# Patient Record
Sex: MALE | Race: WHITE | NOT HISPANIC OR LATINO
[De-identification: names, ages, dates, MRNs, and addresses within clinical notes are randomized per-mention and may not be internally consistent; named-entity substitution may affect disease eponyms.]

---

## 2019-06-19 ENCOUNTER — APPOINTMENT (OUTPATIENT)
Dept: ENDOCRINOLOGY | Facility: CLINIC | Age: 84
End: 2019-06-19
Payer: MEDICARE

## 2019-06-19 VITALS
OXYGEN SATURATION: 99 % | WEIGHT: 156 LBS | BODY MASS INDEX: 21.84 KG/M2 | DIASTOLIC BLOOD PRESSURE: 72 MMHG | HEIGHT: 71 IN | SYSTOLIC BLOOD PRESSURE: 130 MMHG | HEART RATE: 72 BPM

## 2019-06-19 DIAGNOSIS — Z78.9 OTHER SPECIFIED HEALTH STATUS: ICD-10-CM

## 2019-06-19 DIAGNOSIS — Z87.39 PERSONAL HISTORY OF OTHER DISEASES OF THE MUSCULOSKELETAL SYSTEM AND CONNECTIVE TISSUE: ICD-10-CM

## 2019-06-19 DIAGNOSIS — Z83.3 FAMILY HISTORY OF DIABETES MELLITUS: ICD-10-CM

## 2019-06-19 DIAGNOSIS — Z87.09 PERSONAL HISTORY OF OTHER DISEASES OF THE RESPIRATORY SYSTEM: ICD-10-CM

## 2019-06-19 DIAGNOSIS — Z60.2 PROBLEMS RELATED TO LIVING ALONE: ICD-10-CM

## 2019-06-19 DIAGNOSIS — K21.9 GASTRO-ESOPHAGEAL REFLUX DISEASE W/OUT ESOPHAGITIS: ICD-10-CM

## 2019-06-19 DIAGNOSIS — Z86.69 PERSONAL HISTORY OF OTHER DISEASES OF THE NERVOUS SYSTEM AND SENSE ORGANS: ICD-10-CM

## 2019-06-19 LAB — GLUCOSE BLDC GLUCOMTR-MCNC: 130

## 2019-06-19 PROCEDURE — 99204 OFFICE O/P NEW MOD 45 MIN: CPT | Mod: 25

## 2019-06-19 PROCEDURE — 82962 GLUCOSE BLOOD TEST: CPT

## 2019-06-19 RX ORDER — PANTOPRAZOLE 40 MG/1
40 TABLET, DELAYED RELEASE ORAL DAILY
Refills: 0 | Status: ACTIVE | COMMUNITY

## 2019-06-19 RX ORDER — GLIMEPIRIDE 4 MG/1
4 TABLET ORAL DAILY
Refills: 0 | Status: DISCONTINUED | COMMUNITY
End: 2019-06-19

## 2019-06-19 RX ORDER — ASPIRIN 81 MG
81 TABLET, DELAYED RELEASE (ENTERIC COATED) ORAL DAILY
Refills: 0 | Status: ACTIVE | COMMUNITY

## 2019-06-19 SDOH — SOCIAL STABILITY - SOCIAL INSECURITY: PROBLEMS RELATED TO LIVING ALONE: Z60.2
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
## 2019-06-19 NOTE — ASSESSMENT
[FreeTextEntry1] : 86 year old male with longstanding type 2 DM with associated nephropathy, HTN and Hyperlipidemia.  His diabetes control is complicated by postprandial hyperglycemia.  He also has a right sided lower extremity ulcer.  \par \par 1. Type 2 DM-  Change glimepiride to glipziide for less risk of hypoglycemia ins setting of CKD and move dosing to before dinner.  Change Januvia dosing to AM.  Arrange for RD visit.  \par 2.  HTN- controlled on amlodipine and benazepril\par 3.  Hyperlipidemia-  continue atorvastatin\par 4.  Right LE ulcer- follow up with wound care.  Does not appear to be acutely infected at this time.

## 2019-06-19 NOTE — PHYSICAL EXAM
[No Proptosis] : no proptosis [Normal Sclera/Conjunctiva] : normal sclera/conjunctiva [No Acute Distress] : no acute distress [No Neck Mass] : no neck mass was observed [No LAD] : no lymphadenopathy [Thyroid Not Enlarged] : the thyroid was not enlarged [No Thyroid Nodules] : there were no palpable thyroid nodules [Normal Rate and Effort] : normal respiratory rhythm and effort [Clear to Auscultation] : lungs were clear to auscultation bilaterally [Normal S1, S2] : normal S1 and S2 [Normal Rate] : heart rate was normal  [Regular Rhythm] : with a regular rhythm [Murmurs] : no murmurs [Pedal Pulses Normal] : the pedal pulses are present [No Joint Swelling] : no joint swelling seen [No Clubbing, Cyanosis] : no clubbing  or cyanosis of the fingernails [Foot Ulcers] : foot ulcers [Acanthosis Nigricans] : no acanthosis nigricans [Normal Sensation on Monofilament Testing] : normal sensation on monofilament testing of lower extremities [No Tremors] : no tremors [Normal Insight/Judgement] : insight and judgment were intact [Normal Affect] : the affect was normal [Normal Mood] : the mood was normal [de-identified] : elderly male [de-identified] : trace edema b/l [de-identified] : the is a 2 cm ulcer on the medial surface of right ankle, seeing wound care

## 2019-06-19 NOTE — REVIEW OF SYSTEMS
[Recent Weight Loss (___ Lbs)] : recent [unfilled] ~Ulb weight loss [Shortness Of Breath] : shortness of breath [Gas/Bloating] : gas/bloating [Ulcer] : ulcer [Polyuria] : no polyuria [Chest Pain] : no chest pain [Blurry Vision] : no blurred vision [Muscle Cramps] : no muscle cramps [Pain/Numbness of Digits] : no pain/numbness of digits [Polydipsia] : no polydipsia [Insomnia] : no insomnia

## 2019-07-05 ENCOUNTER — APPOINTMENT (OUTPATIENT)
Dept: ENDOCRINOLOGY | Facility: CLINIC | Age: 84
End: 2019-07-05

## 2019-08-29 ENCOUNTER — APPOINTMENT (OUTPATIENT)
Dept: ENDOCRINOLOGY | Facility: CLINIC | Age: 84
End: 2019-08-29

## 2019-11-05 ENCOUNTER — APPOINTMENT (OUTPATIENT)
Dept: ENDOCRINOLOGY | Facility: CLINIC | Age: 84
End: 2019-11-05
Payer: MEDICARE

## 2019-11-05 VITALS
HEART RATE: 50 BPM | SYSTOLIC BLOOD PRESSURE: 128 MMHG | DIASTOLIC BLOOD PRESSURE: 80 MMHG | WEIGHT: 160 LBS | BODY MASS INDEX: 22.4 KG/M2 | HEIGHT: 71 IN | OXYGEN SATURATION: 95 %

## 2019-11-05 DIAGNOSIS — E56.9 VITAMIN DEFICIENCY, UNSPECIFIED: ICD-10-CM

## 2019-11-05 DIAGNOSIS — I10 ESSENTIAL (PRIMARY) HYPERTENSION: ICD-10-CM

## 2019-11-05 LAB — GLUCOSE BLDC GLUCOMTR-MCNC: 235

## 2019-11-05 PROCEDURE — 99214 OFFICE O/P EST MOD 30 MIN: CPT | Mod: 25

## 2019-11-05 PROCEDURE — 97803 MED NUTRITION INDIV SUBSEQ: CPT

## 2019-11-05 PROCEDURE — 82962 GLUCOSE BLOOD TEST: CPT

## 2019-11-05 RX ORDER — AMLODIPINE BESYLATE 2.5 MG/1
2.5 TABLET ORAL DAILY
Refills: 0 | Status: DISCONTINUED | COMMUNITY
End: 2019-11-05

## 2019-11-05 NOTE — PHYSICAL EXAM
[No Acute Distress] : no acute distress [Normal Sclera/Conjunctiva] : normal sclera/conjunctiva [No Proptosis] : no proptosis [No Neck Mass] : no neck mass was observed [No LAD] : no lymphadenopathy [Thyroid Not Enlarged] : the thyroid was not enlarged [No Thyroid Nodules] : there were no palpable thyroid nodules [Normal Rate and Effort] : normal respiratory rhythm and effort [Clear to Auscultation] : lungs were clear to auscultation bilaterally [Normal Rate] : heart rate was normal  [Normal S1, S2] : normal S1 and S2 [Regular Rhythm] : with a regular rhythm [No Joint Swelling] : no joint swelling seen [No Clubbing, Cyanosis] : no clubbing  or cyanosis of the fingernails [Normal Insight/Judgement] : insight and judgment were intact [Normal Affect] : the affect was normal [Normal Mood] : the mood was normal [Acanthosis Nigricans] : no acanthosis nigricans [de-identified] : elderly male [de-identified] : 2/6 systolic murmur,  occasional irregular beat.

## 2019-11-05 NOTE — REVIEW OF SYSTEMS
[Fatigue] : fatigue [Shortness Of Breath] : shortness of breath [Chest Pain] : no chest pain [Nausea] : no nausea [Pain/Numbness of Digits] : no pain/numbness of digits
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
## 2019-11-05 NOTE — ASSESSMENT
[FreeTextEntry1] : 86 year old male with longstanding type 2 DM with associated nephropathy, HTN and Hyperlipidemia.  His glycemic control has worsened.  \par \par 1. Type 2 DM-    Increase Glipizide to 10 mg BID.  Check A1c now\par 2.  HTN- controlled on Benazepril\par 3.  Hyperlipidemia-  continue atorvastatin\par  4.  Elevated TSH-  TSH of 5, however this was done in setting of acute infection and may represent sick euthyroid syndrome.  Repeat labs now.

## 2019-12-02 ENCOUNTER — MEDICATION RENEWAL (OUTPATIENT)
Age: 84
End: 2019-12-02

## 2019-12-13 ENCOUNTER — RX RENEWAL (OUTPATIENT)
Age: 84
End: 2019-12-13

## 2020-01-02 ENCOUNTER — RESULT REVIEW (OUTPATIENT)
Age: 85
End: 2020-01-02

## 2020-01-08 ENCOUNTER — RESULT REVIEW (OUTPATIENT)
Age: 85
End: 2020-01-08

## 2020-01-15 ENCOUNTER — EMERGENCY (EMERGENCY)
Facility: HOSPITAL | Age: 85
LOS: 1 days | Discharge: HOME | End: 2020-01-15
Attending: EMERGENCY MEDICINE
Payer: MEDICARE

## 2020-01-15 VITALS — DIASTOLIC BLOOD PRESSURE: 67 MMHG | HEART RATE: 84 BPM | SYSTOLIC BLOOD PRESSURE: 120 MMHG | TEMPERATURE: 98 F

## 2020-01-15 VITALS
HEART RATE: 75 BPM | TEMPERATURE: 99 F | HEIGHT: 66 IN | RESPIRATION RATE: 16 BRPM | WEIGHT: 139.99 LBS | SYSTOLIC BLOOD PRESSURE: 127 MMHG | OXYGEN SATURATION: 98 % | DIASTOLIC BLOOD PRESSURE: 71 MMHG

## 2020-01-15 LAB
ALBUMIN SERPL ELPH-MCNC: 4.1 G/DL — SIGNIFICANT CHANGE UP (ref 3.3–5.2)
ALP SERPL-CCNC: 109 U/L — SIGNIFICANT CHANGE UP (ref 40–120)
ALT FLD-CCNC: 11 U/L — SIGNIFICANT CHANGE UP
ANION GAP SERPL CALC-SCNC: 15 MMOL/L — SIGNIFICANT CHANGE UP (ref 5–17)
APTT BLD: 28.7 SEC — SIGNIFICANT CHANGE UP (ref 27.5–36.3)
AST SERPL-CCNC: 16 U/L — SIGNIFICANT CHANGE UP
BILIRUB SERPL-MCNC: 0.4 MG/DL — SIGNIFICANT CHANGE UP (ref 0.4–2)
BUN SERPL-MCNC: 53 MG/DL — HIGH (ref 8–20)
CALCIUM SERPL-MCNC: 9.3 MG/DL — SIGNIFICANT CHANGE UP (ref 8.6–10.2)
CHLORIDE SERPL-SCNC: 98 MMOL/L — SIGNIFICANT CHANGE UP (ref 98–107)
CO2 SERPL-SCNC: 26 MMOL/L — SIGNIFICANT CHANGE UP (ref 22–29)
CREAT SERPL-MCNC: 1.89 MG/DL — HIGH (ref 0.5–1.3)
GLUCOSE SERPL-MCNC: 172 MG/DL — HIGH (ref 70–115)
HCT VFR BLD CALC: 40.9 % — SIGNIFICANT CHANGE UP (ref 39–50)
HGB BLD-MCNC: 11.8 G/DL — LOW (ref 13–17)
INR BLD: 1.07 RATIO — SIGNIFICANT CHANGE UP (ref 0.88–1.16)
MAGNESIUM SERPL-MCNC: 1.7 MG/DL — SIGNIFICANT CHANGE UP (ref 1.6–2.6)
MCHC RBC-ENTMCNC: 23.9 PG — LOW (ref 27–34)
MCHC RBC-ENTMCNC: 28.9 GM/DL — LOW (ref 32–36)
MCV RBC AUTO: 83 FL — SIGNIFICANT CHANGE UP (ref 80–100)
NT-PROBNP SERPL-SCNC: 5659 PG/ML — HIGH (ref 0–300)
PLATELET # BLD AUTO: 287 K/UL — SIGNIFICANT CHANGE UP (ref 150–400)
POTASSIUM SERPL-MCNC: 4.3 MMOL/L — SIGNIFICANT CHANGE UP (ref 3.5–5.3)
POTASSIUM SERPL-SCNC: 4.3 MMOL/L — SIGNIFICANT CHANGE UP (ref 3.5–5.3)
PROT SERPL-MCNC: 7.3 G/DL — SIGNIFICANT CHANGE UP (ref 6.6–8.7)
PROTHROM AB SERPL-ACNC: 12.3 SEC — SIGNIFICANT CHANGE UP (ref 10–12.9)
RBC # BLD: 4.93 M/UL — SIGNIFICANT CHANGE UP (ref 4.2–5.8)
RBC # FLD: 15.8 % — HIGH (ref 10.3–14.5)
SODIUM SERPL-SCNC: 139 MMOL/L — SIGNIFICANT CHANGE UP (ref 135–145)
TROPONIN T SERPL-MCNC: 0.04 NG/ML — SIGNIFICANT CHANGE UP (ref 0–0.06)
WBC # BLD: 11.32 K/UL — HIGH (ref 3.8–10.5)
WBC # FLD AUTO: 11.32 K/UL — HIGH (ref 3.8–10.5)

## 2020-01-15 PROCEDURE — 85610 PROTHROMBIN TIME: CPT

## 2020-01-15 PROCEDURE — 99284 EMERGENCY DEPT VISIT MOD MDM: CPT | Mod: 25

## 2020-01-15 PROCEDURE — 83880 ASSAY OF NATRIURETIC PEPTIDE: CPT

## 2020-01-15 PROCEDURE — 96360 HYDRATION IV INFUSION INIT: CPT

## 2020-01-15 PROCEDURE — 85027 COMPLETE CBC AUTOMATED: CPT

## 2020-01-15 PROCEDURE — 71045 X-RAY EXAM CHEST 1 VIEW: CPT

## 2020-01-15 PROCEDURE — 83735 ASSAY OF MAGNESIUM: CPT

## 2020-01-15 PROCEDURE — 93005 ELECTROCARDIOGRAM TRACING: CPT

## 2020-01-15 PROCEDURE — 93010 ELECTROCARDIOGRAM REPORT: CPT

## 2020-01-15 PROCEDURE — 36415 COLL VENOUS BLD VENIPUNCTURE: CPT

## 2020-01-15 PROCEDURE — 99285 EMERGENCY DEPT VISIT HI MDM: CPT

## 2020-01-15 PROCEDURE — 85730 THROMBOPLASTIN TIME PARTIAL: CPT

## 2020-01-15 PROCEDURE — 71045 X-RAY EXAM CHEST 1 VIEW: CPT | Mod: 26

## 2020-01-15 PROCEDURE — 84484 ASSAY OF TROPONIN QUANT: CPT

## 2020-01-15 PROCEDURE — 80053 COMPREHEN METABOLIC PANEL: CPT

## 2020-01-15 RX ORDER — SODIUM CHLORIDE 9 MG/ML
500 INJECTION INTRAMUSCULAR; INTRAVENOUS; SUBCUTANEOUS ONCE
Refills: 0 | Status: COMPLETED | OUTPATIENT
Start: 2020-01-15 | End: 2020-01-15

## 2020-01-15 RX ADMIN — SODIUM CHLORIDE 500 MILLILITER(S): 9 INJECTION INTRAMUSCULAR; INTRAVENOUS; SUBCUTANEOUS at 15:27

## 2020-01-15 RX ADMIN — SODIUM CHLORIDE 500 MILLILITER(S): 9 INJECTION INTRAMUSCULAR; INTRAVENOUS; SUBCUTANEOUS at 16:27

## 2020-01-15 NOTE — ED PROVIDER NOTE - PATIENT PORTAL LINK FT
You can access the FollowMyHealth Patient Portal offered by [facility] by registering at the following website: [email or web address]. By joining [employer]’s FollowMyHealth portal, you will also be able to view your health information using other applications (apps) compatible with our system.

## 2020-01-15 NOTE — ED ADULT TRIAGE NOTE - CHIEF COMPLAINT QUOTE
patient c/o feeling dizzy, home aide, took vitals and noticed to have abnormal vital signs, patient recently started taking metoprolol. EMS reported to have bradycardia, and was brought to Critical Care, MD Pavon called to evaluate.

## 2020-01-15 NOTE — ED PROVIDER NOTE - PHYSICAL EXAMINATION
Gen: No acute distress, non toxic  HEENT: Mucous membranes moist, pink conjunctivae, EOMI  CV: irregular  nl s1/s2.  Resp: CTAB, normal rate and effort  GI: Abdomen soft, NT, ND. No rebound, no guarding  : No CVAT  Neuro: A&O x 3, moving all 4 extremities  MSK: No spine or joint tenderness to palpation  Skin: No rashes. intact and perfused.

## 2020-01-15 NOTE — ED ADULT NURSE NOTE - ED CARDIAC RHYTHM
bigeminy
Rajesh Rosas), Obstetrics and Gynecology  [address] [facility]  [address] [place] [address]  Phone: [phone]  Fax: [phone]

## 2020-01-15 NOTE — ED ADULT NURSE NOTE - CHPI ED NUR SYMPTOMS NEG
no weakness/no nausea/no chills/no decreased eating/drinking/no fever/no pain/no tingling/no vomiting

## 2020-01-15 NOTE — ED ADULT NURSE NOTE - OBJECTIVE STATEMENT
pt reports home health aid called hospital because he was hypotensive and bradycardiac. pt arrives normotensive with hr in 70's. a and o x3. breathing even and unlabored. bigeminy on nsr. pt reports minor dizziness, notes he started on Metroprolol recently. pt only other complaints is lower back pain which he reports is chronic. sitting calm in bed. will continue to monitor.

## 2020-01-15 NOTE — ED PROVIDER NOTE - CLINICAL SUMMARY MEDICAL DECISION MAKING FREE TEXT BOX
pt with dizziness after starting entresto/metoprolol yesterday, no symptoms today, bp was ?in 90's per ems with bigger cuff, used appropriate cuff here and bp has been ~120/70 entire time here. frequent pvc, pt and family states that's the norm for him. sinus rhythm on ekg and monitor with clear p waves. pt with prior testing showing effusion, xr here with effusion, satting 100% on room air and no change. left a message for pt pcp, no call back but family set up pcp appointment again for tmrw at 330, would like pt not to stay. pt without complaints and has very good follow up, labs grossly wnl, with known effusion and vs all wnl. given results, strict return precautions if anything changing. hayes for d/c. will hold entresto until sees pcp tmrw. to see cards within week.

## 2020-01-15 NOTE — ED PROVIDER NOTE - OBJECTIVE STATEMENT
85 y/o male hx chf, htn, frequent pvc dm recent 9 day stay at [place] with drainage of left effusion, started on multiple metoprolol and entresto yesterday by pcp ( NP Katelyn) sent in by ambulance after visiting OT checked vitals and felt pt bp was low and pulse was low. Pt states felt dizzy yesterday right after starting the entresto. Has been feeling fine today, no complaints, no cp, sob, dizziness, cough, f/c. Pt's kids bedside stating usual state of health since yesterday other than feeling dizzy after meds that resolved. Has multiple f/u this week including pulm, gi, cardio.    ROS: No fever/chills. No eye pain/changes in vision, No ear pain/sore throat/dysphagia, No chest pain/palpitations. No SOB/cough/. No abdominal pain, N/V/D, no black/bloody bm. No dysuria/frequency/discharge, No headache. No Dizziness.    No rashes or breaks in skin. No numbness/tingling/weakness.

## 2020-01-15 NOTE — ED ADULT NURSE NOTE - NSIMPLEMENTINTERV_GEN_ALL_ED
Implemented All Fall Risk Interventions:  [place] to call system. Call bell, personal items and telephone within reach. Instruct patient to call for assistance. Room bathroom lighting operational. Non-slip footwear when patient is off stretcher. Physically safe environment: no spills, clutter or unnecessary equipment. Stretcher in lowest position, wheels locked, appropriate side rails in place. Provide visual cue, wrist band, yellow gown, etc. Monitor gait and stability. Monitor for mental status changes and reorient to person, place, and time. Review medications for side effects contributing to fall risk. Reinforce activity limits and safety measures with patient and family.

## 2020-03-05 ENCOUNTER — RX RENEWAL (OUTPATIENT)
Age: 85
End: 2020-03-05

## 2020-03-05 RX ORDER — ATORVASTATIN CALCIUM 10 MG/1
10 TABLET, FILM COATED ORAL
Qty: 90 | Refills: 1 | Status: ACTIVE | COMMUNITY
Start: 2020-03-05 | End: 1900-01-01

## 2020-03-12 ENCOUNTER — RX RENEWAL (OUTPATIENT)
Age: 85
End: 2020-03-12

## 2020-03-12 RX ORDER — GLIPIZIDE 10 MG/1
10 TABLET ORAL TWICE DAILY
Qty: 180 | Refills: 1 | Status: ACTIVE | COMMUNITY
Start: 2019-06-19 | End: 1900-01-01

## 2020-03-31 ENCOUNTER — APPOINTMENT (OUTPATIENT)
Dept: ENDOCRINOLOGY | Facility: CLINIC | Age: 85
End: 2020-03-31
Payer: MEDICARE

## 2020-03-31 DIAGNOSIS — E11.29 TYPE 2 DIABETES MELLITUS WITH OTHER DIABETIC KIDNEY COMPLICATION: ICD-10-CM

## 2020-03-31 DIAGNOSIS — E78.5 HYPERLIPIDEMIA, UNSPECIFIED: ICD-10-CM

## 2020-03-31 DIAGNOSIS — E03.9 HYPOTHYROIDISM, UNSPECIFIED: ICD-10-CM

## 2020-03-31 PROCEDURE — G2012 BRIEF CHECK IN BY MD/QHP: CPT

## 2020-03-31 PROCEDURE — 98966 PH1 ASSMT&MGMT NQHP 5-10: CPT | Mod: CR

## 2020-03-31 RX ORDER — BENAZEPRIL HYDROCHLORIDE 5 MG/1
5 TABLET ORAL DAILY
Refills: 0 | Status: DISCONTINUED | COMMUNITY
End: 2020-03-31

## 2020-04-21 PROBLEM — E11.29 TYPE 2 DIABETES MELLITUS WITH OTHER DIABETIC KIDNEY COMPLICATION, WITHOUT LONG-TERM CURRENT USE OF INSULIN: Status: ACTIVE | Noted: 2019-06-19

## 2020-05-13 ENCOUNTER — RX RENEWAL (OUTPATIENT)
Age: 85
End: 2020-05-13

## 2020-05-13 RX ORDER — SITAGLIPTIN 50 MG/1
50 TABLET, FILM COATED ORAL DAILY
Qty: 90 | Refills: 1 | Status: ACTIVE | COMMUNITY
Start: 1900-01-01 | End: 1900-01-01

## 2020-05-20 DIAGNOSIS — Z00.00 ENCOUNTER FOR GENERAL ADULT MEDICAL EXAMINATION W/OUT ABNORMAL FINDINGS: ICD-10-CM

## 2020-07-09 ENCOUNTER — APPOINTMENT (OUTPATIENT)
Dept: ENDOCRINOLOGY | Facility: CLINIC | Age: 85
End: 2020-07-09

## 2020-08-03 ENCOUNTER — INPATIENT (INPATIENT)
Facility: HOSPITAL | Age: 85
LOS: 8 days | Discharge: HOME | DRG: 981 | End: 2020-08-12
Attending: HOSPITALIST | Admitting: INTERNAL MEDICINE
Payer: MEDICARE

## 2020-08-03 VITALS
HEIGHT: 71 IN | WEIGHT: 130.07 LBS | DIASTOLIC BLOOD PRESSURE: 71 MMHG | TEMPERATURE: 98 F | SYSTOLIC BLOOD PRESSURE: 111 MMHG | OXYGEN SATURATION: 99 % | HEART RATE: 90 BPM | RESPIRATION RATE: 18 BRPM

## 2020-08-03 DIAGNOSIS — Z98.890 OTHER SPECIFIED POSTPROCEDURAL STATES: Chronic | ICD-10-CM

## 2020-08-03 LAB
ALBUMIN SERPL ELPH-MCNC: 3.6 G/DL — SIGNIFICANT CHANGE UP (ref 3.3–5.2)
ALP SERPL-CCNC: 120 U/L — SIGNIFICANT CHANGE UP (ref 40–120)
ALT FLD-CCNC: 11 U/L — SIGNIFICANT CHANGE UP
ANION GAP SERPL CALC-SCNC: 12 MMOL/L — SIGNIFICANT CHANGE UP (ref 5–17)
ANISOCYTOSIS BLD QL: SIGNIFICANT CHANGE UP
APTT BLD: 29.9 SEC — SIGNIFICANT CHANGE UP (ref 27.5–35.5)
AST SERPL-CCNC: 18 U/L — SIGNIFICANT CHANGE UP
BASOPHILS # BLD AUTO: 0 K/UL — SIGNIFICANT CHANGE UP (ref 0–0.2)
BASOPHILS NFR BLD AUTO: 0 % — SIGNIFICANT CHANGE UP (ref 0–2)
BILIRUB SERPL-MCNC: 0.5 MG/DL — SIGNIFICANT CHANGE UP (ref 0.4–2)
BUN SERPL-MCNC: 44 MG/DL — HIGH (ref 8–20)
CALCIUM SERPL-MCNC: 8.6 MG/DL — SIGNIFICANT CHANGE UP (ref 8.6–10.2)
CHLORIDE SERPL-SCNC: 101 MMOL/L — SIGNIFICANT CHANGE UP (ref 98–107)
CO2 SERPL-SCNC: 27 MMOL/L — SIGNIFICANT CHANGE UP (ref 22–29)
CREAT SERPL-MCNC: 1.45 MG/DL — HIGH (ref 0.5–1.3)
DACRYOCYTES BLD QL SMEAR: SLIGHT — SIGNIFICANT CHANGE UP
ELLIPTOCYTES BLD QL SMEAR: SLIGHT — SIGNIFICANT CHANGE UP
EOSINOPHIL # BLD AUTO: 0.06 K/UL — SIGNIFICANT CHANGE UP (ref 0–0.5)
EOSINOPHIL NFR BLD AUTO: 0.9 % — SIGNIFICANT CHANGE UP (ref 0–6)
GIANT PLATELETS BLD QL SMEAR: PRESENT — SIGNIFICANT CHANGE UP
GLUCOSE SERPL-MCNC: 124 MG/DL — HIGH (ref 70–99)
HCT VFR BLD CALC: 34 % — LOW (ref 39–50)
HGB BLD-MCNC: 9.2 G/DL — LOW (ref 13–17)
HYPOCHROMIA BLD QL: SLIGHT — SIGNIFICANT CHANGE UP
INR BLD: 1.18 RATIO — HIGH (ref 0.88–1.16)
LYMPHOCYTES # BLD AUTO: 0.97 K/UL — LOW (ref 1–3.3)
LYMPHOCYTES # BLD AUTO: 14.3 % — SIGNIFICANT CHANGE UP (ref 13–44)
MACROCYTES BLD QL: SLIGHT — SIGNIFICANT CHANGE UP
MAGNESIUM SERPL-MCNC: 1.9 MG/DL — SIGNIFICANT CHANGE UP (ref 1.6–2.6)
MANUAL SMEAR VERIFICATION: SIGNIFICANT CHANGE UP
MCHC RBC-ENTMCNC: 23.5 PG — LOW (ref 27–34)
MCHC RBC-ENTMCNC: 27.1 GM/DL — LOW (ref 32–36)
MCV RBC AUTO: 86.7 FL — SIGNIFICANT CHANGE UP (ref 80–100)
MICROCYTES BLD QL: SIGNIFICANT CHANGE UP
MONOCYTES # BLD AUTO: 0.48 K/UL — SIGNIFICANT CHANGE UP (ref 0–0.9)
MONOCYTES NFR BLD AUTO: 7.1 % — SIGNIFICANT CHANGE UP (ref 2–14)
NEUTROPHILS # BLD AUTO: 5.25 K/UL — SIGNIFICANT CHANGE UP (ref 1.8–7.4)
NEUTROPHILS NFR BLD AUTO: 77.7 % — HIGH (ref 43–77)
NT-PROBNP SERPL-SCNC: HIGH PG/ML (ref 0–300)
OVALOCYTES BLD QL SMEAR: SLIGHT — SIGNIFICANT CHANGE UP
PLAT MORPH BLD: NORMAL — SIGNIFICANT CHANGE UP
PLATELET # BLD AUTO: 205 K/UL — SIGNIFICANT CHANGE UP (ref 150–400)
POIKILOCYTOSIS BLD QL AUTO: SLIGHT — SIGNIFICANT CHANGE UP
POLYCHROMASIA BLD QL SMEAR: SLIGHT — SIGNIFICANT CHANGE UP
POTASSIUM SERPL-MCNC: 4.4 MMOL/L — SIGNIFICANT CHANGE UP (ref 3.5–5.3)
POTASSIUM SERPL-SCNC: 4.4 MMOL/L — SIGNIFICANT CHANGE UP (ref 3.5–5.3)
PROT SERPL-MCNC: 6.8 G/DL — SIGNIFICANT CHANGE UP (ref 6.6–8.7)
PROTHROM AB SERPL-ACNC: 13.6 SEC — SIGNIFICANT CHANGE UP (ref 10.6–13.6)
RBC # BLD: 3.92 M/UL — LOW (ref 4.2–5.8)
RBC # FLD: 23.2 % — HIGH (ref 10.3–14.5)
RBC BLD AUTO: ABNORMAL
SODIUM SERPL-SCNC: 140 MMOL/L — SIGNIFICANT CHANGE UP (ref 135–145)
TROPONIN T SERPL-MCNC: 0.04 NG/ML — SIGNIFICANT CHANGE UP (ref 0–0.06)
WBC # BLD: 6.76 K/UL — SIGNIFICANT CHANGE UP (ref 3.8–10.5)
WBC # FLD AUTO: 6.76 K/UL — SIGNIFICANT CHANGE UP (ref 3.8–10.5)

## 2020-08-03 PROCEDURE — 99218: CPT | Mod: CS

## 2020-08-03 PROCEDURE — 71045 X-RAY EXAM CHEST 1 VIEW: CPT | Mod: 26,CS

## 2020-08-03 PROCEDURE — 93971 EXTREMITY STUDY: CPT | Mod: 26,LT

## 2020-08-03 PROCEDURE — 93010 ELECTROCARDIOGRAM REPORT: CPT

## 2020-08-03 RX ORDER — ASPIRIN/CALCIUM CARB/MAGNESIUM 324 MG
325 TABLET ORAL ONCE
Refills: 0 | Status: COMPLETED | OUTPATIENT
Start: 2020-08-03 | End: 2020-08-03

## 2020-08-03 RX ORDER — FUROSEMIDE 40 MG
20 TABLET ORAL ONCE
Refills: 0 | Status: COMPLETED | OUTPATIENT
Start: 2020-08-03 | End: 2020-08-03

## 2020-08-03 NOTE — ED ADULT NURSE NOTE - OBJECTIVE STATEMENT
Late Entry: received pt from previous nurse alert and awake in bed  breathing with ease on ra, in no acute distress.

## 2020-08-03 NOTE — ED CDU PROVIDER INITIAL DAY NOTE - NS ED ROS FT
Constitutional: no fever, sweats, and no chills.  Eyes: no pain, no redness, and no visual changes.  ENMT: no ear pain and no hearing problems, no nasal congestion/drainage, no dysphagia, and no throat pain, no neck pain, no stiffness  CV: no chest pain, no edema.  Resp: no cough, +dyspnea  GI: no abdominal pain, no bloating, no constipation, no diarrhea, no nausea and no vomiting.  : no dysuria, no hematuria  MSK: no msk pain, no weakness  Skin: +ulcer L heel, no rashes.  Neuro: no LOC, no headache, no sensory deficits, and no weakness.

## 2020-08-03 NOTE — ED PROVIDER NOTE - PMH
CHF (congestive heart failure)    DM (diabetes mellitus)    HLD (hyperlipidemia)    HTN (hypertension)    PVCs (premature ventricular contractions)

## 2020-08-03 NOTE — ED ADULT TRIAGE NOTE - CHIEF COMPLAINT QUOTE
Shortness of breath x months, worsening today.  Pt denies chest pain or shortness of breath at this time.  Respirations unlabored, speaking in full sentences.  Denies home o2 use.

## 2020-08-03 NOTE — ED PROVIDER NOTE - ATTENDING CONTRIBUTION TO CARE
KARLA  86yoM; with pmh signif for CHF, HTN, Frequent PVCs, left pleural effusion (s/p drainage at [place] in [place]); now p/w sob.  denies chest pain, but c/o discomfort in epigastrium, radiating to chest associated with nausea and diaphoresis. denies cough. denies f/c/s. denies sick contacts. denies headache.  PMH:  CHF, HTN  EXAM:  General:     NAD, well-nourished, well-appearing  Head:     NC/AT, EOMI, oral mucosa moist  Neck:     trachea midline  Lungs:    trace bibasilar crackles  CVS:     S1S2, RRR, no m/g/r  Abd:     +BS, s/nt/nd, no organomegaly  Ext:    2+ radial and pedal pulses, no c/c/e  Neuro: AAOx3, no sensory/motor deficits  A/P:  86yoM with hx of CHF complicated by pleural effusion; now p/w sob  -labs, iv, xray, cardiac monitor, pulse ox

## 2020-08-03 NOTE — ED CDU PROVIDER INITIAL DAY NOTE - ATTENDING CONTRIBUTION TO CARE
86yo M w/ PMHx CHF, HTN, HLD, DM, frequent PVCs (followed by Dr. Reyes, [place] cardiology) presenting with chronic intermittent SOB. The SOB started months ago, lasts seconds, accompanied by 4-5/10 epigastric pressure, most recent episode this AM, since resolved. Pt reports a visiting nurse comes to his home every 1-2 days for wound care of a L heel ulcer, and today thought his lungs sounded weaker. +MEJÍA.  General:     NAD, well-nourished, well-appearing  Head:     NC/AT, EOMI, oral mucosa moist  Neck:     trachea midline  Lungs:   trace bibasilar crackles  CVS:     S1S2, RRR, no m/g/r  Abd:     +BS, s/nt/nd, no organomegaly  Ext:    2+ radial and pedal pulses, no c/c/e  Neuro: AAOx3, no sensory/motor deficits  A/P:  87yoM; with pmh signif for CHF, HTN, HLD, DM; now p/w sob  -serial enzymes  -cardiac monitor  -echo  -[facility] cardiology consult

## 2020-08-03 NOTE — ED PROVIDER NOTE - PHYSICAL EXAMINATION
General: well appearing, NAD  Head:  NC, AT  Eyes: EOMI, PERRLA, no scleral icterus  Ears: no erythema/drainage  Nose: midline, no bleeding/drainage  Throat: MMM  Cardiac: RRR, no m/r/g, no lower extremity edema  Respiratory: CTABL, no wheezes/rales/rhonchi, equal chest wall expansions  Abdomen: soft, ND, NT, no rebound tenderness, no guarding, nonperitonitic  MSK/Vascular: full ROM, distal pulses intact, soft compartments, warm extremities  Skin: ulcer L heel, bandaged on arrival; scarring on R ankle from previous ulcer, now healed  Neuro: AAOx3, strength 5/5, cranial nerves 2-12 grossly intact  Psych: calm, cooperative, normal affect

## 2020-08-03 NOTE — ED CDU PROVIDER INITIAL DAY NOTE - OBJECTIVE STATEMENT
88yo M w/ PMHx CHF, HTN, HLD, DM, frequent PVCs (followed by Dr. Reyes, [place] cardiology) presenting with chronic intermittent SOB. The SOB started months ago, lasts seconds, accompanied by 4-5/10 epigastric pressure, most recent episode this AM, since resolved. Pt reports a visiting nurse comes to his home every 1-2 days for wound care of a L heel ulcer, and today thought his lungs sounded weaker. +MEJÍA. Denies fever, HA, vision changes, dizziness, CP, cough, abd pain, n/v, LE edema. Denies sick contacts. Never smoker. Last stress test about 2y ago. Mobile with a walker, lives alone but has 3 daughters who visit regularly.

## 2020-08-03 NOTE — ED PROVIDER NOTE - CLINICAL SUMMARY MEDICAL DECISION MAKING FREE TEXT BOX
patient with sob, worsening cxr with pulmonary edema, vitals stable, will diurese, place in obs for serial enzymes, echo, and cardiology consult

## 2020-08-03 NOTE — ED ADULT NURSE NOTE - NSIMPLEMENTINTERV_GEN_ALL_ED
Implemented All Fall with Harm Risk Interventions:  [place] to call system. Call bell, personal items and telephone within reach. Instruct patient to call for assistance. Room bathroom lighting operational. Non-slip footwear when patient is off stretcher. Physically safe environment: no spills, clutter or unnecessary equipment. Stretcher in lowest position, wheels locked, appropriate side rails in place. Provide visual cue, wrist band, yellow gown, etc. Monitor gait and stability. Monitor for mental status changes and reorient to person, place, and time. Review medications for side effects contributing to fall risk. Reinforce activity limits and safety measures with patient and family. Provide visual clues: red socks.

## 2020-08-03 NOTE — ED PROVIDER NOTE - OBJECTIVE STATEMENT
88yo M w/ PMHx CHF, HTN, HLD, DM, frequent PVCs (followed by Dr. Reyes, [place] cardiology) presenting with chronic intermittent SOB. The SOB started months ago, lasts seconds, accompanied by 4-5/10 epigastric pressure, most recent episode this AM, since resolved. Pt reports a visiting nurse comes to his home every 1-2 days for wound care of a L heel ulcer, and today thought his lungs sounded weaker. +MEJÍA. Denies fever, HA, vision changes, dizziness, CP, cough, abd pain, n/v, LE edema. Denies sick contacts. Never smoker. Last stress test about 2y ago. Mobile with a walker, lives alone but has 3 daughters who visit regularly.

## 2020-08-04 DIAGNOSIS — I50.43 ACUTE ON CHRONIC COMBINED SYSTOLIC (CONGESTIVE) AND DIASTOLIC (CONGESTIVE) HEART FAILURE: ICD-10-CM

## 2020-08-04 DIAGNOSIS — I50.9 HEART FAILURE, UNSPECIFIED: ICD-10-CM

## 2020-08-04 DIAGNOSIS — J90 PLEURAL EFFUSION, NOT ELSEWHERE CLASSIFIED: ICD-10-CM

## 2020-08-04 LAB
A1C WITH ESTIMATED AVERAGE GLUCOSE RESULT: 6.3 % — HIGH (ref 4–5.6)
ALBUMIN SERPL ELPH-MCNC: 3.4 G/DL — SIGNIFICANT CHANGE UP (ref 3.3–5.2)
ALP SERPL-CCNC: 107 U/L — SIGNIFICANT CHANGE UP (ref 40–120)
ALT FLD-CCNC: 9 U/L — SIGNIFICANT CHANGE UP
ANION GAP SERPL CALC-SCNC: 13 MMOL/L — SIGNIFICANT CHANGE UP (ref 5–17)
AST SERPL-CCNC: 16 U/L — SIGNIFICANT CHANGE UP
B PERT IGG+IGM PNL SER: CLEAR — SIGNIFICANT CHANGE UP
BILIRUB SERPL-MCNC: 0.3 MG/DL — LOW (ref 0.4–2)
BUN SERPL-MCNC: 41 MG/DL — HIGH (ref 8–20)
CALCIUM SERPL-MCNC: 7.9 MG/DL — LOW (ref 8.6–10.2)
CHLORIDE SERPL-SCNC: 104 MMOL/L — SIGNIFICANT CHANGE UP (ref 98–107)
CHOLEST SERPL-MCNC: 73 MG/DL — LOW (ref 110–199)
CK SERPL-CCNC: 21 U/L — LOW (ref 30–200)
CO2 SERPL-SCNC: 25 MMOL/L — SIGNIFICANT CHANGE UP (ref 22–29)
COLOR FLD: YELLOW
CREAT SERPL-MCNC: 1.35 MG/DL — HIGH (ref 0.5–1.3)
ESTIMATED AVERAGE GLUCOSE: 134 MG/DL — HIGH (ref 68–114)
FERRITIN SERPL-MCNC: 1159 NG/ML — HIGH (ref 30–400)
FLUID INTAKE SUBSTANCE CLASS: SIGNIFICANT CHANGE UP
FLUID SEGMENTED GRANULOCYTES: 1 % — SIGNIFICANT CHANGE UP
GLUCOSE BLDC GLUCOMTR-MCNC: 110 MG/DL — HIGH (ref 70–99)
GLUCOSE BLDC GLUCOMTR-MCNC: 125 MG/DL — HIGH (ref 70–99)
GLUCOSE BLDC GLUCOMTR-MCNC: 168 MG/DL — HIGH (ref 70–99)
GLUCOSE BLDC GLUCOMTR-MCNC: 179 MG/DL — HIGH (ref 70–99)
GLUCOSE BLDC GLUCOMTR-MCNC: 251 MG/DL — HIGH (ref 70–99)
GLUCOSE BLDC GLUCOMTR-MCNC: 77 MG/DL — SIGNIFICANT CHANGE UP (ref 70–99)
GLUCOSE SERPL-MCNC: 109 MG/DL — HIGH (ref 70–99)
GRAM STN FLD: SIGNIFICANT CHANGE UP
HCT VFR BLD CALC: 31.8 % — LOW (ref 39–50)
HDLC SERPL-MCNC: 25 MG/DL — LOW
HGB BLD-MCNC: 8.9 G/DL — LOW (ref 13–17)
IRON SATN MFR SERPL: 21 % — SIGNIFICANT CHANGE UP (ref 16–55)
IRON SATN MFR SERPL: 54 UG/DL — LOW (ref 59–158)
LIPID PNL WITH DIRECT LDL SERPL: 28 MG/DL — SIGNIFICANT CHANGE UP
LYMPHOCYTES # FLD: 55 % — SIGNIFICANT CHANGE UP
MAGNESIUM SERPL-MCNC: 1.8 MG/DL — SIGNIFICANT CHANGE UP (ref 1.6–2.6)
MCHC RBC-ENTMCNC: 23.9 PG — LOW (ref 27–34)
MCHC RBC-ENTMCNC: 28 GM/DL — LOW (ref 32–36)
MCV RBC AUTO: 85.5 FL — SIGNIFICANT CHANGE UP (ref 80–100)
MESOTHL CELL # FLD: 1 % — SIGNIFICANT CHANGE UP
MONOS+MACROS # FLD: 43 % — SIGNIFICANT CHANGE UP
PH FLD: 8 — SIGNIFICANT CHANGE UP
PHOSPHATE SERPL-MCNC: 1.5 MG/DL — LOW (ref 2.4–4.7)
PLATELET # BLD AUTO: 173 K/UL — SIGNIFICANT CHANGE UP (ref 150–400)
POTASSIUM SERPL-MCNC: 3.4 MMOL/L — LOW (ref 3.5–5.3)
POTASSIUM SERPL-SCNC: 3.4 MMOL/L — LOW (ref 3.5–5.3)
PROT SERPL-MCNC: 6 G/DL — LOW (ref 6.6–8.7)
RBC # BLD: 3.72 M/UL — LOW (ref 4.2–5.8)
RBC # FLD: 23.4 % — HIGH (ref 10.3–14.5)
RCV VOL RI: 140 /UL — HIGH (ref 0–0)
SARS-COV-2 RNA SPEC QL NAA+PROBE: SIGNIFICANT CHANGE UP
SARS-COV-2 RNA SPEC QL NAA+PROBE: SIGNIFICANT CHANGE UP
SODIUM SERPL-SCNC: 142 MMOL/L — SIGNIFICANT CHANGE UP (ref 135–145)
SPECIMEN SOURCE: SIGNIFICANT CHANGE UP
TIBC SERPL-MCNC: 252 UG/DL — SIGNIFICANT CHANGE UP (ref 220–430)
TOTAL CHOLESTEROL/HDL RATIO MEASUREMENT: 3 RATIO — LOW (ref 3.4–9.6)
TOTAL NUCLEATED CELL COUNT, BODY FLUID: 213 /UL — SIGNIFICANT CHANGE UP
TRANSFERRIN SERPL-MCNC: 176 MG/DL — LOW (ref 180–329)
TRIGL SERPL-MCNC: 101 MG/DL — SIGNIFICANT CHANGE UP (ref 10–200)
TROPONIN T SERPL-MCNC: 0.04 NG/ML — SIGNIFICANT CHANGE UP (ref 0–0.06)
TSH SERPL-MCNC: 6.57 UIU/ML — HIGH (ref 0.27–4.2)
TUBE TYPE: SIGNIFICANT CHANGE UP
WBC # BLD: 5.42 K/UL — SIGNIFICANT CHANGE UP (ref 3.8–10.5)
WBC # FLD AUTO: 5.42 K/UL — SIGNIFICANT CHANGE UP (ref 3.8–10.5)

## 2020-08-04 PROCEDURE — 99223 1ST HOSP IP/OBS HIGH 75: CPT

## 2020-08-04 PROCEDURE — 99217: CPT | Mod: CS

## 2020-08-04 PROCEDURE — 71045 X-RAY EXAM CHEST 1 VIEW: CPT | Mod: 26

## 2020-08-04 PROCEDURE — 71250 CT THORAX DX C-: CPT | Mod: 26

## 2020-08-04 PROCEDURE — 99223 1ST HOSP IP/OBS HIGH 75: CPT | Mod: AI

## 2020-08-04 PROCEDURE — 93010 ELECTROCARDIOGRAM REPORT: CPT

## 2020-08-04 PROCEDURE — 99232 SBSQ HOSP IP/OBS MODERATE 35: CPT

## 2020-08-04 RX ORDER — ATORVASTATIN CALCIUM 80 MG/1
10 TABLET, FILM COATED ORAL AT BEDTIME
Refills: 0 | Status: DISCONTINUED | OUTPATIENT
Start: 2020-08-04 | End: 2020-08-04

## 2020-08-04 RX ORDER — ATORVASTATIN CALCIUM 80 MG/1
40 TABLET, FILM COATED ORAL AT BEDTIME
Refills: 0 | Status: DISCONTINUED | OUTPATIENT
Start: 2020-08-04 | End: 2020-08-06

## 2020-08-04 RX ORDER — FUROSEMIDE 40 MG
20 TABLET ORAL
Refills: 0 | Status: DISCONTINUED | OUTPATIENT
Start: 2020-08-04 | End: 2020-08-06

## 2020-08-04 RX ORDER — DEXTROSE 50 % IN WATER 50 %
12.5 SYRINGE (ML) INTRAVENOUS ONCE
Refills: 0 | Status: DISCONTINUED | OUTPATIENT
Start: 2020-08-04 | End: 2020-08-05

## 2020-08-04 RX ORDER — GLUCAGON INJECTION, SOLUTION 0.5 MG/.1ML
1 INJECTION, SOLUTION SUBCUTANEOUS ONCE
Refills: 0 | Status: DISCONTINUED | OUTPATIENT
Start: 2020-08-04 | End: 2020-08-05

## 2020-08-04 RX ORDER — INSULIN LISPRO 100/ML
VIAL (ML) SUBCUTANEOUS
Refills: 0 | Status: DISCONTINUED | OUTPATIENT
Start: 2020-08-04 | End: 2020-08-05

## 2020-08-04 RX ORDER — LIDOCAINE HCL 20 MG/ML
10 VIAL (ML) INJECTION ONCE
Refills: 0 | Status: DISCONTINUED | OUTPATIENT
Start: 2020-08-04 | End: 2020-08-12

## 2020-08-04 RX ORDER — DEXTROSE 50 % IN WATER 50 %
25 SYRINGE (ML) INTRAVENOUS ONCE
Refills: 0 | Status: DISCONTINUED | OUTPATIENT
Start: 2020-08-04 | End: 2020-08-05

## 2020-08-04 RX ORDER — ASPIRIN/CALCIUM CARB/MAGNESIUM 324 MG
81 TABLET ORAL DAILY
Refills: 0 | Status: DISCONTINUED | OUTPATIENT
Start: 2020-08-04 | End: 2020-08-12

## 2020-08-04 RX ORDER — OXYCODONE AND ACETAMINOPHEN 5; 325 MG/1; MG/1
1 TABLET ORAL ONCE
Refills: 0 | Status: DISCONTINUED | OUTPATIENT
Start: 2020-08-04 | End: 2020-08-04

## 2020-08-04 RX ORDER — PANTOPRAZOLE SODIUM 20 MG/1
40 TABLET, DELAYED RELEASE ORAL
Refills: 0 | Status: DISCONTINUED | OUTPATIENT
Start: 2020-08-04 | End: 2020-08-12

## 2020-08-04 RX ORDER — DEXTROSE 50 % IN WATER 50 %
15 SYRINGE (ML) INTRAVENOUS ONCE
Refills: 0 | Status: DISCONTINUED | OUTPATIENT
Start: 2020-08-04 | End: 2020-08-05

## 2020-08-04 RX ORDER — SODIUM CHLORIDE 9 MG/ML
1000 INJECTION, SOLUTION INTRAVENOUS
Refills: 0 | Status: DISCONTINUED | OUTPATIENT
Start: 2020-08-04 | End: 2020-08-05

## 2020-08-04 RX ORDER — ALPRAZOLAM 0.25 MG
0.25 TABLET ORAL DAILY
Refills: 0 | Status: DISCONTINUED | OUTPATIENT
Start: 2020-08-04 | End: 2020-08-11

## 2020-08-04 RX ORDER — HEPARIN SODIUM 5000 [USP'U]/ML
5000 INJECTION INTRAVENOUS; SUBCUTANEOUS EVERY 8 HOURS
Refills: 0 | Status: DISCONTINUED | OUTPATIENT
Start: 2020-08-04 | End: 2020-08-06

## 2020-08-04 RX ADMIN — Medication 325 MILLIGRAM(S): at 00:49

## 2020-08-04 RX ADMIN — Medication 0.25 MILLIGRAM(S): at 13:40

## 2020-08-04 RX ADMIN — ATORVASTATIN CALCIUM 40 MILLIGRAM(S): 80 TABLET, FILM COATED ORAL at 21:27

## 2020-08-04 RX ADMIN — HEPARIN SODIUM 5000 UNIT(S): 5000 INJECTION INTRAVENOUS; SUBCUTANEOUS at 17:17

## 2020-08-04 RX ADMIN — Medication 20 MILLIGRAM(S): at 17:18

## 2020-08-04 RX ADMIN — Medication 20 MILLIGRAM(S): at 06:19

## 2020-08-04 RX ADMIN — OXYCODONE AND ACETAMINOPHEN 1 TABLET(S): 5; 325 TABLET ORAL at 21:27

## 2020-08-04 RX ADMIN — OXYCODONE AND ACETAMINOPHEN 1 TABLET(S): 5; 325 TABLET ORAL at 22:29

## 2020-08-04 RX ADMIN — Medication 81 MILLIGRAM(S): at 12:24

## 2020-08-04 RX ADMIN — Medication 2: at 06:20

## 2020-08-04 NOTE — ED ADULT NURSE REASSESSMENT NOTE - NS ED NURSE REASSESS COMMENT FT1
Dry scaling skin bilateral lower extremities. Scab over wounds are present on the ankles and feet bilaterally. Dry scaling skin bilateral lower extremities. Scabs present to ankles and feet bilaterally. Sacrum tender to touch, no redness present, skin dry, intact.

## 2020-08-04 NOTE — H&P ADULT - NSICDXPASTMEDICALHX_GEN_ALL_CORE_FT
PAST MEDICAL HISTORY:  CHF (congestive heart failure)     DM (diabetes mellitus)     HLD (hyperlipidemia)     HTN (hypertension)     PVCs (premature ventricular contractions)

## 2020-08-04 NOTE — CONSULT NOTE ADULT - PROBLEM SELECTOR RECOMMENDATION 2
For Cath tomorrow    To be admitted to medicine  Discussed with cardiology, hospitalist and Dr Leggett
(2) very limited

## 2020-08-04 NOTE — ED ADULT NURSE REASSESSMENT NOTE - NSIMPLEMENTINTERV_GEN_ALL_ED
Implemented All Fall with Harm Risk Interventions:  [place] to call system. Call bell, personal items and telephone within reach. Instruct patient to call for assistance. Room bathroom lighting operational. Non-slip footwear when patient is off stretcher. Physically safe environment: no spills, clutter or unnecessary equipment. Stretcher in lowest position, wheels locked, appropriate side rails in place. Provide visual cue, wrist band, yellow gown, etc. Monitor gait and stability. Monitor for mental status changes and reorient to person, place, and time. Review medications for side effects contributing to fall risk. Reinforce activity limits and safety measures with patient and family. Provide visual clues: red socks.

## 2020-08-04 NOTE — PROCEDURE NOTE - NSPROCDETAILS_GEN_ALL_CORE
dressing applied/percutaneous/Seldinger technique/secured in place/sterile dressing applied/ultrasound assessment of fluid (location)

## 2020-08-04 NOTE — ED ADULT NURSE REASSESSMENT NOTE - ED CARDIAC RATE
95/normal normal/pt NSR on CM. Pt had 5 beat run of v-tach. MURALI Florence notified and acknowledged.

## 2020-08-04 NOTE — ED ADULT NURSE REASSESSMENT NOTE - ED CARDIAC RHYTHM
regular pt NSR on CM. Pt had 5 beat run of v-tach. MURALI [place] notified and acknowledged./regular

## 2020-08-04 NOTE — PROVIDER CONTACT NOTE (OTHER) - BACKGROUND
Pt reports living alone, girlfriend sometimes stays with him on occasions. pt has 3 daughter within the area that assist as needed. Pt feels he manages alone but thinks he needs more assistance.

## 2020-08-04 NOTE — H&P ADULT - HISTORY OF PRESENT ILLNESS
87y old  Male with PMH/ HFpEF, CAD, HTN, HLD, DM type 2 on oral hypoglycemics, CKD stage III; rib fractures complicated by L-sided pleural effusion, requiring thoracentesis ~2 years ago; B/L pleural effusions, s/p L thoracentesis- 01/02/20, transudative, chronic lightheadedness, GERD, who presents with a chief complaint of MEJÍA and gait instability. Pt states that since about 2 mos ago he has been feeling short of breath, more with exertion -- walking around his apartment or with bending to put his shoes on. Pt states that today his visiting nurse became concerned about his breathing, him getting "winded" with minimal exertion and abnormal lung sounds. Pt states that he has been feeling the same since about 2 mos ago. Pt states that sometimes he feels chest burning if he eats late or he eats heavy meal, especially meat. pt states that he lost about 10 lbs since about 2 mos ago due to has been taking Abx for L heal ulcer and his appetite has been poor. Pt states that his doctor discontinued BP medications 2/2 low BP. Pt states that he has been drinking 0.5 L bottles of water-- about 2-3 every day. Pt denies any CP/dizziness/diaphoresis/F/C/cough/abdominal pain/sick contacts/other complaints.   Above HPI per cardio  read and agreed with verified with patient -[place] , he was started on Zinc per his PCP 2 weeks ago when he visited for SOB , he denied any edema or orthopnea , denied fever or cough or sick contact   States he is anxious about the cardiac cath and i explained the cath procedure , discussed that he might need thoracentesis with bilateral pleural effusions

## 2020-08-04 NOTE — ED ADULT NURSE REASSESSMENT NOTE - NS ED NURSE REASSESS COMMENT FT1
patient resting comfortably in bed, denies sob denies cp denies n/v/d patient reports weakness noted and states he is tired. airway patent lungs clear equal bilaterally. saturating well on room air. patient updated on plan of care, will continue to monitor for changes.

## 2020-08-04 NOTE — ED CDU PROVIDER SUBSEQUENT DAY NOTE - ATTENDING CONTRIBUTION TO CARE
seen on rounds with acp  In obs for exacerbation of CHF(acute on chronic)  has been seen by cardiology - awaiting echo report  no distress at this time  continue care plan

## 2020-08-04 NOTE — H&P ADULT - ATTENDING COMMENTS
Pt seen and examined    PHYSICAL EXAM:  Vital Signs Last 24 Hrs  T(C): 36.9 (04 Aug 2020 11:23), Max: 37 (04 Aug 2020 03:14)  T(F): 98.5 (04 Aug 2020 11:23), Max: 98.6 (04 Aug 2020 03:14)  HR: 68 (04 Aug 2020 11:23) (65 - 90)  BP: 108/67 (04 Aug 2020 11:23) (97/53 - 128/69)  BP(mean): --  RR: 18 (04 Aug 2020 11:23) (18 - 23)  SpO2: 98% (04 Aug 2020 11:23) (94% - 99%)    GENERAL: NAD, well-groomed, well-developed  HEAD:  Atraumatic, Normocephalic  EYES: EOMI, PERRLA, conjunctiva and sclera clear  ENMT: No tonsillar erythema, exudates, or enlargement; Moist mucous membranes  NERVOUS SYSTEM:  Alert & Oriented X3, Good concentration; Motor Strength 5/5 B/L upper and lower extremities  CHEST/LUNG:  bilaterally decreased BS at bases with crackles; No rales, rhonchi, wheezing  HEART: Regular rate and rhythm; No murmurs  ABDOMEN: Soft, Nontender, Nondistended; Bowel sounds present  EXTREMITIES:  2+ Peripheral Pulses, No clubbing, cyanosis, or edema    1) 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

## 2020-08-04 NOTE — ED ADULT NURSE REASSESSMENT NOTE - GENERAL PATIENT STATE
comfortable appearance/cooperative/resting/sleeping/smiling/interactive
comfortable appearance/cooperative/resting/sleeping/smiling/interactive

## 2020-08-04 NOTE — PHYSICAL THERAPY INITIAL EVALUATION ADULT - ADDITIONAL COMMENTS
per patient he lives alone, but has a girlfriend that stays with him on occasion. He states that at home when he is alone, he is able to manage, but thinks that he needs another hand in the house. Pt ambulates with a RW at baseline. Upon this D/C he feels his 3 daughters may be able to coordinate staying with him if needed until he can get more assistance. Pt has 2 SEDA with a hand rail and a lift to the bedroom. Pt has a left heel wound that is actively healing, wound care nurse comes to the house to care for it. Pt reports elevated blood sugars at night after meals ~230s.

## 2020-08-04 NOTE — PROCEDURE NOTE - ADDITIONAL PROCEDURE DETAILS
Tube clamped at 1000ml, RN directed to unclamp tube at 1800 and allow for an additional 1L of drainage per hour as needed.

## 2020-08-04 NOTE — ED ADULT NURSE REASSESSMENT NOTE - NS ED NURSE REASSESS COMMENT FT1
successfully completed pt evaluation with recommendation for home PT. accucheck 77, cristino jesus made aware, pt is npo asked for further clarification on diet order necessity due to low sugar, provided apple juice. patient denies dysphagia, pt awake alert oriented x 3 patient going off unit for echo off monitor, will reevaluate sugar upon return from exam.

## 2020-08-04 NOTE — CONSULT NOTE ADULT - SUBJECTIVE AND OBJECTIVE BOX
[place] [facility]                                                               Office:  [address] [address]                                                              Telephone: [phone]. Fax:[phone]                                                                        CARDIOLOGY CONSULTATION NOTE                                                                                             History obtained by: Patient and medical record   obtained: No    Chief complaint:    Patient is a 87y old  Male who presents with a chief complaint of MEJÍA. Pt states that since about 2-3 mos ago he has been feeling short of breath, more with exertion around his apartment or with bending to put his shoes on. Pt states that his visiting nurse became concerned about his breathing, him getting "winded" with minimal exertion and abnormal lung sounds.       HPI:        REVIEW OF SYMPTOMS:     CONSTITUTIONAL: No fever, weight loss, or fatigue  ENMT:  No difficulty hearing, tinnitus, vertigo; No sinus or throat pain  NECK: No pain or stiffness  CARDIOVASCULAR: No chest pain, dyspnea, syncope, palpitations, dizziness, Orthopnea, Paroxsymal nocturnal dyspnea  RESPIRATORY: No Dyspnea on exertion, Shortness of breath, cough, wheezing  : No dysuria, no hematuria   GI: No dark color stool, no melena, no diarrhea, no constipation, no abdominal pain   NEURO: No headache, no dizziness, no slurred speech   MUSCULOSKELETAL: No joint pain or swelling; No muscle, back, or extremity pain  PSYCH: No agitation, no anxiety.    ALL OTHER REVIEW OF SYSTEMS ARE NEGATIVE.      PREVIOUS DIAGNOSTIC TESTING  ECHO FINDINGS:      STRESS FINDINGS:      CATHETERIZATION FINDINGS:         ALLERGIES: Allergies    No Known Allergies    Intolerances          PAST MEDICAL HISTORY  DM (diabetes mellitus)  HLD (hyperlipidemia)  HTN (hypertension)  PVCs (premature ventricular contractions)  CHF (congestive heart failure)      PAST SURGICAL HISTORY  H/O right inguinal hernia repair      FAMILY HISTORY:      SOCIAL HISTORY:  Denies smoking/alcohol/drugs  CIGARETTES:     ALCOHOL:  DRUGS:      CURRENT MEDICATIONS:     atorvastatin  dextrose 5%.  dextrose 50% Injectable  dextrose 50% Injectable  dextrose 50% Injectable  insulin lispro (HumaLOG) corrective regimen sliding scale        HOME MEDICATIONS:      Vital Signs Last 24 Hrs  T(C): 36.6 (03 Aug 2020 19:08), Max: 36.6 (03 Aug 2020 19:08)  T(F): 97.8 (03 Aug 2020 19:08), Max: 97.8 (03 Aug 2020 19:08)  HR: 65 (04 Aug 2020 01:03) (65 - 90)  BP: 97/53 (04 Aug 2020 01:03) (97/53 - 118/70)  BP(mean): --  RR: 20 (03 Aug 2020 23:11) (18 - 20)  SpO2: 99% (03 Aug 2020 19:08) (99% - 99%)      PHYSICAL EXAM:  Constitutional: Comfortable. No acute distress.   HEENT: Atraumatic and normocephalic, neck is supple. No JVD. No carotid bruit. PEERL   CNS: A&Ox3. No focal deficits. EOMI. Cranial nerves II-IX are intact.   Lymph Nodes: Cervical: Not palpable.  Respiratory: CTAB  Cardiovascular: S1S2 RRR. No murmur/rubs or gallop.  Gastrointestinal: Soft non-tender and non distended. +Bowel sounds. Negative Mckeon's sign.  Extremities: No edema.   Psychiatric: Calm. No agitation.  Skin: No skin rash/ulcers visualized to face, hands or feet.    Intake and output:     LABS:                        9.2    6.76  )-----------( 205      ( 03 Aug 2020 20:46 )             34.0     08-03    140  |  101  |  44.0<H>  ----------------------------<  124<H>  4.4   |  27.0  |  1.45<H>    Ca    8.6      03 Aug 2020 20:46  Mg     1.9     08-03    TPro  6.8  /  Alb  3.6  /  TBili  0.5  /  DBili  x   /  AST  18  /  ALT  11  /  AlkPhos  120  08-03    CARDIAC MARKERS ( 04 Aug 2020 01:24 )  x     / 0.04 ng/mL / x     / x     / x      CARDIAC MARKERS ( 03 Aug 2020 20:46 )  x     / 0.04 ng/mL / x     / x     / x        ;p-BNP=Serum Pro-Brain Natriuretic Peptide: 08723 pg/mL (08-03 @ 20:46)    PT/INR - ( 03 Aug 2020 20:46 )   PT: 13.6 sec;   INR: 1.18 ratio         PTT - ( 03 Aug 2020 20:46 )  PTT:29.9 sec      INTERPRETATION OF TELEMETRY: Reviewed by me.   ECG: Reviewed by me.     RADIOLOGY & ADDITIONAL STUDIES:    X-ray:  reviewed by me.     CT scan:   MRI: [place] [facility]                                                               Office:  [address] [address]                                                              Telephone: [phone]. Fax:[phone]                                                                        CARDIOLOGY CONSULTATION NOTE                                                                                             History obtained by: Patient and medical record   obtained: No    Chief complaint: dyspnea on exertion     HPI:  Patient is a 87y old  Male who presents with a chief complaint of MEJÍA. Pt states that since about 2 mos ago he has been feeling short of breath, more with exertion around his apartment or with bending to put his shoes on. Pt states that today his visiting nurse became concerned about his breathing, him getting "winded" with minimal exertion and abnormal lung sounds. Pt states that he has been feeling the same since about 2 mos ago. Pt states that sometimes he feels chest burning if he eats late or he eats heavy meal, especially meat. pt states that he lost about 10 lbs since about 2 mos ago due to has been taking Abx for L heal ulcer and his appetite has been poor. Pt states that his doctor discontinued BP medications 2/2 low BP. Pt states that he has been drinking 0.5 L bottles of water-- about 2-3 every day. Pt denies any CP/dizziness/diaphoresis/F/C/cough/abdominal pain/sick contacts/other complaints.   -- cardiology-- Dr Mo -- ([facility])   -- PCP- Dr Heart/NP Katelyn -- in [place]   -- pulmonary -- Hema Dunham>> changed-- new pulmonary MD -- does not know name     -- ED Course: pt was medicated with Lasix 20 mg IVP, placed on tele,       REVIEW OF SYMPTOMS:     CONSTITUTIONAL: No fever, weight loss, or fatigue  ENMT:  No difficulty hearing, tinnitus, vertigo; No sinus or throat pain  NECK: No pain or stiffness  CARDIOVASCULAR: +dyspnea on exertion, No chest pain, dyspnea, syncope, palpitations, dizziness, Orthopnea, Paroxsymal nocturnal dyspnea, no weight gain,   RESPIRATORY: + Dyspnea on exertion, Shortness of breath, cough, wheezing  : No dysuria, no hematuria, no frequency   GI: No dark color stool, no melena, no diarrhea, no constipation, no abdominal pain   NEURO: No headache, no dizziness, no slurred speech   MUSCULOSKELETAL: No joint pain or swelling; No muscle, back, or extremity pain  PSYCH: No agitation, no anxiety.    ALL OTHER REVIEW OF SYSTEMS ARE NEGATIVE.      PREVIOUS DIAGNOSTIC TESTING  ECHO FINDINGS: 01/03/2020 -- LV fx-- 55 %, unable to assess RV fx, mild MR, mod-severe TR, severe pulmonary HTN, possible small vegetation on the ventricular site of LV,       CATHETERIZATION FINDINGS:         ALLERGIES: Allergies    No Known Allergies    Intolerances          PAST MEDICAL HISTORY  DM (diabetes mellitus)  HLD (hyperlipidemia)  HTN (hypertension)  PVCs (premature ventricular contractions)  CHF (congestive heart failure)      PAST SURGICAL HISTORY  H/O right inguinal hernia repair      FAMILY HISTORY:      SOCIAL HISTORY:  Denies smoking/alcohol/drugs  CIGARETTES:     ALCOHOL:  DRUGS:      CURRENT MEDICATIONS:     atorvastatin  dextrose 5%.  dextrose 50% Injectable  dextrose 50% Injectable  dextrose 50% Injectable  insulin lispro (HumaLOG) corrective regimen sliding scale        HOME MEDICATIONS:      Vital Signs Last 24 Hrs  T(C): 36.6 (03 Aug 2020 19:08), Max: 36.6 (03 Aug 2020 19:08)  T(F): 97.8 (03 Aug 2020 19:08), Max: 97.8 (03 Aug 2020 19:08)  HR: 65 (04 Aug 2020 01:03) (65 - 90)  BP: 97/53 (04 Aug 2020 01:03) (97/53 - 118/70)  BP(mean): --  RR: 20 (03 Aug 2020 23:11) (18 - 20)  SpO2: 99% (03 Aug 2020 19:08) (99% - 99%)      PHYSICAL EXAM:  Constitutional: Comfortable. No acute distress.   HEENT: Atraumatic and normocephalic, neck is supple. No JVD. No carotid bruit. PEERL   CNS: A&Ox3. No focal deficits. EOMI. Cranial nerves II-IX are intact.   Lymph Nodes: Cervical: Not palpable.  Respiratory: CTAB  Cardiovascular: S1S2 RRR. No murmur/rubs or gallop.  Gastrointestinal: Soft non-tender and non distended. +Bowel sounds. Negative Mckeon's sign.  Extremities: No edema.   Psychiatric: Calm. No agitation.  Skin: No skin rash/ulcers visualized to face, hands or feet.    Intake and output:     LABS:                        9.2    6.76  )-----------( 205      ( 03 Aug 2020 20:46 )             34.0     08-03    140  |  101  |  44.0<H>  ----------------------------<  124<H>  4.4   |  27.0  |  1.45<H>    Ca    8.6      03 Aug 2020 20:46  Mg     1.9     08-03    TPro  6.8  /  Alb  3.6  /  TBili  0.5  /  DBili  x   /  AST  18  /  ALT  11  /  AlkPhos  120  08-03    CARDIAC MARKERS ( 04 Aug 2020 01:24 )  x     / 0.04 ng/mL / x     / x     / x      CARDIAC MARKERS ( 03 Aug 2020 20:46 )  x     / 0.04 ng/mL / x     / x     / x        ;p-BNP=Serum Pro-Brain Natriuretic Peptide: 44678 pg/mL (08-03 @ 20:46)    PT/INR - ( 03 Aug 2020 20:46 )   PT: 13.6 sec;   INR: 1.18 ratio         PTT - ( 03 Aug 2020 20:46 )  PTT:29.9 sec      INTERPRETATION OF TELEMETRY: Reviewed by me.   ECG: Reviewed by me.     RADIOLOGY & ADDITIONAL STUDIES:    X-ray:  reviewed by me.     CT scan:   MRI: [place] [facility]                                                               Office:  [address] [address]                                                              Telephone: [phone]. Fax:[phone]                                                                        CARDIOLOGY CONSULTATION NOTE                                                                                             History obtained by: Patient and medical record   obtained: No    Chief complaint: dyspnea on exertion     HPI:  Patient is a 87y old  Male who presents with a chief complaint of MEJÍA. Pt states that since about 2 mos ago he has been feeling short of breath, more with exertion around his apartment or with bending to put his shoes on. Pt states that today his visiting nurse became concerned about his breathing, him getting "winded" with minimal exertion and abnormal lung sounds. Pt states that he has been feeling the same since about 2 mos ago. Pt states that sometimes he feels chest burning if he eats late or he eats heavy meal, especially meat. pt states that he lost about 10 lbs since about 2 mos ago due to has been taking Abx for L heal ulcer and his appetite has been poor. Pt states that his doctor discontinued BP medications 2/2 low BP. Pt states that he has been drinking 0.5 L bottles of water-- about 2-3 every day. Pt denies any CP/dizziness/diaphoresis/F/C/cough/abdominal pain/sick contacts/other complaints.   -- cardiology-- Dr Mo -- ([place] [facility])   -- PCP- Dr Heart/NP Katelyn -- in [place]   -- pulmonary -- Hema Dunham>> changed-- new pulmonary MD -- does not know name     -- ED Course: pt was medicated with Lasix 20 mg IVP, placed on tele,       REVIEW OF SYMPTOMS:     CONSTITUTIONAL: No fever, weight loss, or fatigue  ENMT:  No difficulty hearing, tinnitus, vertigo; No sinus or throat pain  NECK: No pain or stiffness  CARDIOVASCULAR: +dyspnea on exertion, No chest pain, dyspnea, syncope, palpitations, dizziness, Orthopnea, Paroxsymal nocturnal dyspnea, no weight gain,   RESPIRATORY: + Dyspnea on exertion, Shortness of breath, cough, wheezing  : No dysuria, no hematuria, no frequency   GI: No dark color stool, no melena, no diarrhea, no constipation, no abdominal pain   NEURO: No headache, no dizziness, no slurred speech   MUSCULOSKELETAL: No joint pain or swelling; No muscle, back, or extremity pain  PSYCH: No agitation, no anxiety.    ALL OTHER REVIEW OF SYSTEMS ARE NEGATIVE.      PREVIOUS DIAGNOSTIC TESTING  ECHO FINDINGS: 01/03/2020 -- LV fx-- 55 %, unable to assess RV fx, mild MR, mod-severe TR, severe pulmonary HTN, possible small vegetation on the ventricular site of LV,       ALLERGIES: Allergies    No Known Allergies      PAST MEDICAL HISTORY  CAD  DM (diabetes mellitus)  HLD (hyperlipidemia)  HTN (hypertension)  PVCs (premature ventricular contractions)  HFrEF (congestive heart failure)  CKD stage 3   pleural effusion, s/p thoracentesis-- 750 cc transudative-- [facility]  -- 01/02/2020 (prior thoracentesis by Dr Oniel campo- 2 yrs ago)       PAST SURGICAL HISTORY  H/O right inguinal hernia repair      FAMILY HISTORY: father- MI       SOCIAL HISTORY:  Denies smoking/alcohol/drugs -- current or prior history         CURRENT MEDICATIONS:     atorvastatin   dextrose 5%.  dextrose 50% Injectable  dextrose 50% Injectable  dextrose 50% Injectable  insulin lispro (HumaLOG) corrective regimen sliding scale        HOME MEDICATIONS:  -- ASA 81 mg  -- Torsemide- 10 mg daily  -- Lipitor -- pt does not know the dose       Vital Signs Last 24 Hrs  T(C): 36.6 (03 Aug 2020 19:08), Max: 36.6 (03 Aug 2020 19:08)  T(F): 97.8 (03 Aug 2020 19:08), Max: 97.8 (03 Aug 2020 19:08)  HR: 65 (04 Aug 2020 01:03) (65 - 90)  BP: 97/53 (04 Aug 2020 01:03) (97/53 - 118/70)  BP(mean): --  RR: 20 (03 Aug 2020 23:11) (18 - 20)  SpO2: 99% (03 Aug 2020 19:08) (99% - 99%)      PHYSICAL EXAM:  Constitutional: Comfortable. No acute distress.   HEENT: Atraumatic and normocephalic, neck is supple. No JVD. No carotid bruit. PEERL   CNS: A&Ox3. No focal deficits. EOMI. Cranial nerves II-IX are intact.   Lymph Nodes: Cervical: Not palpable.  Respiratory: diminished breath sounds B/L, no r/r/w  Cardiovascular: S1S2 RRR. + diastolic murmur-- 2/6, No rubs or gallop.  Gastrointestinal: Soft non-tender and non distended. +Bowel sounds. Negative Mckeon's sign.  Extremities: L leg-- 1 + pitting edema.   Psychiatric: Calm. No agitation.  Skin: No skin rash/ulcers visualized to face, hands or feet.    Intake and output: -- ordered__ monitor     LABS:                        9.2    6.76  )-----------( 205      ( 03 Aug 2020 20:46 )             34.0     08-03    140  |  101  |  44.0<H>  ----------------------------<  124<H>  4.4   |  27.0  |  1.45<H>    Ca    8.6      03 Aug 2020 20:46  Mg     1.9     08-03    TPro  6.8  /  Alb  3.6  /  TBili  0.5  /  DBili  x   /  AST  18  /  ALT  11  /  AlkPhos  120  08-03    CARDIAC MARKERS ( 04 Aug 2020 01:24 )  x     / 0.04 ng/mL / x     / x     / x      CARDIAC MARKERS ( 03 Aug 2020 20:46 )  x     / 0.04 ng/mL / x     / x     / x        ;p-BNP=Serum Pro-Brain Natriuretic Peptide: 31656 pg/mL (08-03 @ 20:46)    PT/INR - ( 03 Aug 2020 20:46 )   PT: 13.6 sec;   INR: 1.18 ratio         PTT - ( 03 Aug 2020 20:46 )  PTT:29.9 sec      INTERPRETATION OF TELEMETRY: Reviewed by me-- NSR with PVC's -- 80's  ECG: Reviewed by me. -- NSR, HR- 71, RBBB, bifascicular block, L anterior fascicular block     RADIOLOGY & ADDITIONAL STUDIES:    X-ray:  reviewed by me prelim-- B/L small pleural effusions, vascular congestion, elevated L hemidiaphragm,     CT scan/chest-- ordered [place] [facility]                                                               Office:  [address] [address]                                                              Telephone: [phone]. Fax:[phone]                                                                        CARDIOLOGY CONSULTATION NOTE                                                                                             History obtained by: Patient and medical record   obtained: No    Chief complaint: dyspnea on exertion     HPI:  Patient is a 87y old  Male who presents with a chief complaint of MEJÍA. Pt states that since about 2 mos ago he has been feeling short of breath, more with exertion -- walking around his apartment or with bending to put his shoes on. Pt states that today his visiting nurse became concerned about his breathing, him getting "winded" with minimal exertion and abnormal lung sounds. Pt states that he has been feeling the same since about 2 mos ago. Pt states that sometimes he feels chest burning if he eats late or he eats heavy meal, especially meat. pt states that he lost about 10 lbs since about 2 mos ago due to has been taking Abx for L heal ulcer and his appetite has been poor. Pt states that his doctor discontinued BP medications 2/2 low BP. Pt states that he has been drinking 0.5 L bottles of water-- about 2-3 every day. Pt denies any CP/dizziness/diaphoresis/F/C/cough/abdominal pain/sick contacts/other complaints.   -- cardiology-- Dr Mo -- ([place] [facility])   -- PCP- Dr Heart/NP [place] -- in [place]   -- pulmonary -- Hema Dunham>> changed-- new pulmonary MD -- does not know name     -- ED Course: pt was medicated with Lasix 20 mg IVP, placed on tele,       REVIEW OF SYMPTOMS:     CONSTITUTIONAL: No fever, weight loss, or fatigue  ENMT:  No difficulty hearing, tinnitus, vertigo; No sinus or throat pain  NECK: No pain or stiffness  CARDIOVASCULAR: +dyspnea on exertion, No chest pain, dyspnea, syncope, palpitations, dizziness, Orthopnea, Paroxsymal nocturnal dyspnea, no weight gain,   RESPIRATORY: + Dyspnea on exertion, Shortness of breath, cough, wheezing  : No dysuria, no hematuria, no frequency   GI: No dark color stool, no melena, no diarrhea, no constipation, no abdominal pain   NEURO: No headache, no dizziness, no slurred speech   MUSCULOSKELETAL: No joint pain or swelling; No muscle, back, or extremity pain  PSYCH: No agitation, no anxiety.    ALL OTHER REVIEW OF SYSTEMS ARE NEGATIVE.      PREVIOUS DIAGNOSTIC TESTING  ECHO FINDINGS: 01/03/2020 -- LV fx-- 55 %, unable to assess RV fx, mild MR, mod-severe TR, severe pulmonary HTN, possible small vegetation on the ventricular site of LV,       ALLERGIES: Allergies    No Known Allergies      PAST MEDICAL HISTORY  CAD  DM (diabetes mellitus)  HLD (hyperlipidemia)  HTN (hypertension)  PVCs (premature ventricular contractions)  HFrEF (congestive heart failure)  CKD stage 3   pleural effusion, s/p thoracentesis-- 750 cc transudative-- [facility]  -- 01/02/2020 (prior thoracentesis by Dr Oniel campo- 2 yrs ago)       PAST SURGICAL HISTORY  H/O right inguinal hernia repair      FAMILY HISTORY: father- MI       SOCIAL HISTORY:  Denies smoking/alcohol/drugs -- current or prior history         CURRENT MEDICATIONS:     atorvastatin   dextrose 5%.  dextrose 50% Injectable  dextrose 50% Injectable  dextrose 50% Injectable  insulin lispro (HumaLOG) corrective regimen sliding scale        HOME MEDICATIONS:  -- ASA 81 mg  -- Torsemide- 10 mg daily  -- Lipitor -- pt does not know the dose       Vital Signs Last 24 Hrs  T(C): 36.6 (03 Aug 2020 19:08), Max: 36.6 (03 Aug 2020 19:08)  T(F): 97.8 (03 Aug 2020 19:08), Max: 97.8 (03 Aug 2020 19:08)  HR: 65 (04 Aug 2020 01:03) (65 - 90)  BP: 97/53 (04 Aug 2020 01:03) (97/53 - 118/70)  BP(mean): --  RR: 20 (03 Aug 2020 23:11) (18 - 20)  SpO2: 99% (03 Aug 2020 19:08) (99% - 99%)      PHYSICAL EXAM:  Constitutional: Comfortable. No acute distress.   HEENT: Atraumatic and normocephalic, neck is supple. No JVD. No carotid bruit. PEERL   CNS: A&Ox3. No focal deficits. EOMI. Cranial nerves II-IX are intact.   Lymph Nodes: Cervical: Not palpable.  Respiratory: diminished breath sounds B/L, no r/r/w  Cardiovascular: S1S2 RRR. + diastolic murmur-- 2/6, No rubs or gallop.  Gastrointestinal: Soft non-tender and non distended. +Bowel sounds. Negative Mckeon's sign.  Extremities: L leg-- 1 + pitting edema.   Psychiatric: Calm. No agitation.  Skin: No skin rash/ulcers visualized to face, hands or feet.    Intake and output: -- ordered__ monitor     LABS:                        9.2    6.76  )-----------( 205      ( 03 Aug 2020 20:46 )             34.0     08-03    140  |  101  |  44.0<H>  ----------------------------<  124<H>  4.4   |  27.0  |  1.45<H>    Ca    8.6      03 Aug 2020 20:46  Mg     1.9     08-03    TPro  6.8  /  Alb  3.6  /  TBili  0.5  /  DBili  x   /  AST  18  /  ALT  11  /  AlkPhos  120  08-03    CARDIAC MARKERS ( 04 Aug 2020 01:24 )  x     / 0.04 ng/mL / x     / x     / x      CARDIAC MARKERS ( 03 Aug 2020 20:46 )  x     / 0.04 ng/mL / x     / x     / x      -- CK- 21     ;p-BNP=Serum Pro-Brain Natriuretic Peptide: 21546 pg/mL (08-03 @ 20:46)    PT/INR - ( 03 Aug 2020 20:46 )   PT: 13.6 sec;   INR: 1.18 ratio         PTT - ( 03 Aug 2020 20:46 )  PTT:29.9 sec      INTERPRETATION OF TELEMETRY: Reviewed by me-- NSR with PVC's -- 80's  ECG: Reviewed by me. -- NSR, HR- 71, RBBB, bifascicular block, L anterior fascicular block     RADIOLOGY & ADDITIONAL STUDIES:    X-ray:  reviewed by me prelim-- B/L small pleural effusions, vascular congestion, elevated L hemidiaphragm,     CT scan/chest-- ordered [place] [facility]                                                               Office:  [address] [address]                                                              Telephone: [phone]. Fax:[phone]                                                                        CARDIOLOGY CONSULTATION NOTE                                                                                             History obtained by: Patient and medical record   obtained: No    Chief complaint: dyspnea on exertion     HPI:  Patient is a 87y old  Male who presents with a chief complaint of MEJÍA. Pt states that since about 2 mos ago he has been feeling short of breath, more with exertion -- walking around his apartment or with bending to put his shoes on. Pt states that today his visiting nurse became concerned about his breathing, him getting "winded" with minimal exertion and abnormal lung sounds. Pt states that he has been feeling the same since about 2 mos ago. Pt states that sometimes he feels chest burning if he eats late or he eats heavy meal, especially meat. pt states that he lost about 10 lbs since about 2 mos ago due to has been taking Abx for L heal ulcer and his appetite has been poor. Pt states that his doctor discontinued BP medications 2/2 low BP. Pt states that he has been drinking 0.5 L bottles of water-- about 2-3 every day. Pt denies any CP/dizziness/diaphoresis/F/C/cough/abdominal pain/sick contacts/other complaints.   -- cardiology-- Dr Mo -- ([place])   -- PCP- Dr Heart/NP [place] -- in [place]   -- pulmonary -- Hema Dunham>> changed-- new pulmonary MD -- does not know name   -- nephrology- Dr Anabel Manriquez,     -- ED Course: pt was medicated with Lasix 20 mg IVP, placed on tele,       REVIEW OF SYMPTOMS:     CONSTITUTIONAL: No fever, weight loss, or fatigue  ENMT:  No difficulty hearing, tinnitus, vertigo; No sinus or throat pain  NECK: No pain or stiffness  CARDIOVASCULAR: +dyspnea on exertion, No chest pain, dyspnea, syncope, palpitations, dizziness, Orthopnea, Paroxsymal nocturnal dyspnea, no weight gain,   RESPIRATORY: + Dyspnea on exertion, Shortness of breath, cough, wheezing  : No dysuria, no hematuria, no frequency   GI: No dark color stool, no melena, no diarrhea, no constipation, no abdominal pain   NEURO: No headache, no dizziness, no slurred speech   MUSCULOSKELETAL: No joint pain or swelling; No muscle, back, or extremity pain  PSYCH: No agitation, no anxiety.    ALL OTHER REVIEW OF SYSTEMS ARE NEGATIVE.      PREVIOUS DIAGNOSTIC TESTING  ECHO FINDINGS: 01/03/2020 -- LV fx-- 55 %, unable to assess RV fx, mild MR, mod-severe TR, severe pulmonary HTN, possible small vegetation on the ventricular site of LV,       ALLERGIES: Allergies    No Known Allergies      PAST MEDICAL HISTORY  CAD  DM (diabetes mellitus)  HLD (hyperlipidemia)  HTN (hypertension)  PVCs (premature ventricular contractions)  HFrEF (congestive heart failure)  CKD stage 3   pleural effusion, s/p thoracentesis-- 750 cc transudative-- [facility]  -- 01/02/2020 (prior thoracentesis by Dr Oniel campo- 2 yrs ago)       PAST SURGICAL HISTORY  H/O right inguinal hernia repair      FAMILY HISTORY: father- MI       SOCIAL HISTORY:  Denies smoking/alcohol/drugs -- current or prior history         CURRENT MEDICATIONS:     atorvastatin   dextrose 5%.  dextrose 50% Injectable  dextrose 50% Injectable  dextrose 50% Injectable  insulin lispro (HumaLOG) corrective regimen sliding scale        HOME MEDICATIONS:  -- ASA 81 mg  -- Torsemide- 10 mg daily  -- Lipitor -- 10 mg qhs( 03/05/2020)  -- Januvia 50 qd (06/19/19)  -- Glipizide 10 daily (06/19/19)  -- Pantoprazole -- 40 daily,       Vital Signs Last 24 Hrs  T(C): 36.6 (03 Aug 2020 19:08), Max: 36.6 (03 Aug 2020 19:08)  T(F): 97.8 (03 Aug 2020 19:08), Max: 97.8 (03 Aug 2020 19:08)  HR: 65 (04 Aug 2020 01:03) (65 - 90)  BP: 97/53 (04 Aug 2020 01:03) (97/53 - 118/70)  BP(mean): --  RR: 20 (03 Aug 2020 23:11) (18 - 20)  SpO2: 99% (03 Aug 2020 19:08) (99% - 99%)      PHYSICAL EXAM:  Constitutional: Comfortable. No acute distress.   HEENT: Atraumatic and normocephalic, neck is supple. No JVD. No carotid bruit. PEERL   CNS: A&Ox3. No focal deficits. EOMI. Cranial nerves II-IX are intact.   Lymph Nodes: Cervical: Not palpable.  Respiratory: diminished breath sounds B/L, no r/r/w  Cardiovascular: S1S2 RRR. + diastolic murmur-- 2/6, +JVD, No rubs or gallop.  Gastrointestinal: Soft non-tender and non distended. +Bowel sounds. Negative Mckeon's sign.  Extremities: L leg-- 1 + pitting edema, L heal ulcer-- was just wrapped with Kerlix, RLE- no swelling, healed heal ulcer    Psychiatric: Calm. No agitation.  Skin: No skin rash/ulcers visualized to face, hands or feet.    Intake and output: -- ordered__ monitor     LABS:                        9.2    6.76  )-----------( 205      ( 03 Aug 2020 20:46 )             34.0     08-03    140  |  101  |  44.0<H>  ----------------------------<  124<H>  4.4   |  27.0  |  1.45<H>    Ca    8.6      03 Aug 2020 20:46  Mg     1.9     08-03    TPro  6.8  /  Alb  3.6  /  TBili  0.5  /  DBili  x   /  AST  18  /  ALT  11  /  AlkPhos  120  08-03    CARDIAC MARKERS ( 04 Aug 2020 01:24 )  x     / 0.04 ng/mL / x     / x     / x      CARDIAC MARKERS ( 03 Aug 2020 20:46 )  x     / 0.04 ng/mL / x     / x     / x      -- CK- 21     ;p-BNP=Serum Pro-Brain Natriuretic Peptide: 40593 pg/mL (08-03 @ 20:46)    PT/INR - ( 03 Aug 2020 20:46 )   PT: 13.6 sec;   INR: 1.18 ratio         PTT - ( 03 Aug 2020 20:46 )  PTT:29.9 sec      INTERPRETATION OF TELEMETRY: Reviewed by me-- NSR with PVC's -- 80's  ECG: Reviewed by me. -- NSR, HR- 71, RBBB, bifascicular block, L anterior fascicular block     RADIOLOGY & ADDITIONAL STUDIES:    X-ray:  reviewed by me prelim-- B/L small pleural effusions, vascular congestion, elevated L hemidiaphragm,     CT scan/chest-- ordered [facility]                                                               Office:  [address] [address]                                                              Telephone: [phone]. Fax:[phone]                                                                        CARDIOLOGY CONSULTATION NOTE                                                                                             History obtained by: Patient and medical record   obtained: No    Chief complaint: dyspnea on exertion     HPI:  Patient is a 87y old  Male with PMH/ HFpEF, CAD, HTN, HLD, DM type 2 on oral hypoglycemics, CKD stage III; rib fractures complicated by L-sided pleural effusion, requiring thoracentesis ~2 years ago; B/L pleural effusions, s/p L thoracentesis- 01/02/20, transudative, chronic lightheadedness, GERD, who presents with a chief complaint of MEJÍA and gait instability. Pt states that since about 2 mos ago he has been feeling short of breath, more with exertion -- walking around his apartment or with bending to put his shoes on. Pt states that today his visiting nurse became concerned about his breathing, him getting "winded" with minimal exertion and abnormal lung sounds. Pt states that he has been feeling the same since about 2 mos ago. Pt states that sometimes he feels chest burning if he eats late or he eats heavy meal, especially meat. pt states that he lost about 10 lbs since about 2 mos ago due to has been taking Abx for L heal ulcer and his appetite has been poor. Pt states that his doctor discontinued BP medications 2/2 low BP. Pt states that he has been drinking 0.5 L bottles of water-- about 2-3 every day. Pt denies any CP/dizziness/diaphoresis/F/C/cough/abdominal pain/sick contacts/other complaints.   -- cardiology-- Dr Mo -- ([facility])   -- PCP- Dr Heart/[facility] [place] -- in [place]   -- pulmonary -- Hema Dunham>> changed-- new pulmonary MD -- does not know name   -- nephrology- Dr Anabel Manriquez,     -- ED Course: pt was medicated with Lasix 20 mg IVP, placed on tele,       REVIEW OF SYMPTOMS:     CONSTITUTIONAL: No fever, weight loss, or fatigue  ENMT:  No difficulty hearing, tinnitus, vertigo; No sinus or throat pain  NECK: No pain or stiffness  CARDIOVASCULAR: +dyspnea on exertion, No chest pain, dyspnea, syncope, palpitations, dizziness, Orthopnea, Paroxsymal nocturnal dyspnea, no weight gain,   RESPIRATORY: + Dyspnea on exertion, Shortness of breath, cough, wheezing  : No dysuria, no hematuria, no frequency   GI: No dark color stool, no melena, no diarrhea, no constipation, no abdominal pain   NEURO: No headache, no dizziness, no slurred speech   MUSCULOSKELETAL: No joint pain or swelling; No muscle, back, or extremity pain  PSYCH: No agitation, no anxiety.    ALL OTHER REVIEW OF SYSTEMS ARE NEGATIVE.      PREVIOUS DIAGNOSTIC TESTING  ECHO FINDINGS: 01/03/2020 -- LV fx-- 55 %, unable to assess RV fx, mild MR, mod-severe TR, severe pulmonary HTN, possible small vegetation on the ventricular site of LV,       ALLERGIES: Allergies    No Known Allergies      PAST MEDICAL HISTORY  CAD  DM (diabetes mellitus)  HLD (hyperlipidemia)  HTN (hypertension)  PVCs (premature ventricular contractions)  HFrEF (congestive heart failure)  CKD stage 3   pleural effusion, s/p thoracentesis-- 750 cc transudative-- [facility]  -- 01/02/2020 [facility]       PAST SURGICAL HISTORY  H/O right inguinal hernia repair      FAMILY HISTORY: father- MI       SOCIAL HISTORY:  Denies smoking/alcohol/drugs -- current or prior history         CURRENT MEDICATIONS:     atorvastatin   dextrose 5%.  dextrose 50% Injectable  dextrose 50% Injectable  dextrose 50% Injectable  insulin lispro (HumaLOG) corrective regimen sliding scale        HOME MEDICATIONS:  -- ASA 81 mg  -- Torsemide- 10 mg daily  -- Lipitor -- 10 mg qhs( 03/05/2020)  -- Januvia 50 qd (06/19/19)  -- Glipizide 10 daily (06/19/19)  -- Pantoprazole -- 40 daily,       Vital Signs Last 24 Hrs  T(C): 36.6 (03 Aug 2020 19:08), Max: 36.6 (03 Aug 2020 19:08)  T(F): 97.8 (03 Aug 2020 19:08), Max: 97.8 (03 Aug 2020 19:08)  HR: 65 (04 Aug 2020 01:03) (65 - 90)  BP: 97/53 (04 Aug 2020 01:03) (97/53 - 118/70)  BP(mean): --  RR: 20 (03 Aug 2020 23:11) (18 - 20)  SpO2: 99% (03 Aug 2020 19:08) (99% - 99%)      PHYSICAL EXAM:  Constitutional: Comfortable. No acute distress.   HEENT: Atraumatic and normocephalic, neck is supple. No JVD. No carotid bruit. PEERL   CNS: A&Ox3. No focal deficits. EOMI. Cranial nerves II-IX are intact.   Lymph Nodes: Cervical: Not palpable.  Respiratory: diminished breath sounds B/L, no r/r/w  Cardiovascular: S1S2 RRR. + diastolic murmur-- 2/6, +JVD, No rubs or gallop.  Gastrointestinal: Soft non-tender and non distended. +Bowel sounds. Negative Mckeon's sign.  Extremities: L leg-- 1 + pitting edema, L heal ulcer-- was just wrapped with Kerlix, RLE- no swelling, healed heal ulcer    Psychiatric: Calm. No agitation.  Skin: No skin rash/ulcers visualized to face, hands or feet.    Intake and output: -- ordered__ monitor     LABS:                        9.2    6.76  )-----------( 205      ( 03 Aug 2020 20:46 )             34.0     08-03    140  |  101  |  44.0<H>  ----------------------------<  124<H>  4.4   |  27.0  |  1.45<H>    Ca    8.6      03 Aug 2020 20:46  Mg     1.9     08-03    TPro  6.8  /  Alb  3.6  /  TBili  0.5  /  DBili  x   /  AST  18  /  ALT  11  /  AlkPhos  120  08-03    CARDIAC MARKERS ( 04 Aug 2020 01:24 )  x     / 0.04 ng/mL / x     / x     / x      CARDIAC MARKERS ( 03 Aug 2020 20:46 )  x     / 0.04 ng/mL / x     / x     / x      -- CK- 21     ;p-BNP=Serum Pro-Brain Natriuretic Peptide: 04089 pg/mL (08-03 @ 20:46)    PT/INR - ( 03 Aug 2020 20:46 )   PT: 13.6 sec;   INR: 1.18 ratio         PTT - ( 03 Aug 2020 20:46 )  PTT:29.9 sec      INTERPRETATION OF TELEMETRY: Reviewed by me-- NSR with PVC's -- 80's  ECG: Reviewed by me. -- NSR, HR- 71, RBBB, bifascicular block, L anterior fascicular block     RADIOLOGY & ADDITIONAL STUDIES:    X-ray:  reviewed by me prelim-- B/L small pleural effusions, vascular congestion, elevated L hemidiaphragm,     CT scan/chest-- ordered

## 2020-08-04 NOTE — PROVIDER CONTACT NOTE (OTHER) - ASSESSMENT
Pt demonstrates mobility, independent transfers and ambulation with RW and supervision as pt reports generalized weakness. See PT eval for details.

## 2020-08-04 NOTE — CONSULT NOTE ADULT - ATTENDING COMMENTS
pt seen and examined  Above noted.   Pt with HFrEF, LVEF on recent TTE is mildly reduced.   Pt was taken off BP meds due to low BP  He is volume overloaded with bilateral pleural effusion. right larger than left.  Pt with atypical cp and sob with mild exertion. CT scan with significant coronary calcification of LAD and Lcx.  Plan for cath, low amount of contrast  Spoke to pt and also his daughter.   Monitor i/os.  Pt will need thoracentesis   I agree with above a/p. pt seen and examined  Above noted.   Pt with HFrEF, LVEF on recent TTE is mildly reduced.   Pt was taken off BP meds due to low BP  He is volume overloaded with bilateral pleural effusion. right larger than left.  Pt with atypical cp and sob with mild exertion. CT scan with significant coronary calcification of LAD and Lcx.  Plan for cath, low amount of contrast  Spoke to pt and also his daughter.   Monitor i/os.   EKG with trifascicular block, pvcs.   Pt will need thoracentesis   I agree with above a/p.

## 2020-08-04 NOTE — H&P ADULT - ASSESSMENT
87y old  Male with PMH/ HFpEF, CAD, HTN, HLD, DM type 2 on oral hypoglycemics, CKD stage III; rib fractures complicated by L-sided pleural effusion, requiring thoracentesis ~2 years ago; B/L pleural effusions, s/p L thoracentesis- 01/02/20, transudative, chronic lightheadedness, GERD, who presents with a chief complaint of MEJÍA and gait instability    SOB- multifactorial - Bilateral pleural effusions , CHF , Anemia     CHF with negative troponin and elevated BNP -Ubnknn  EF - Follow ECHO   BP on the soft side - may not tolerate ACEI or BB- for now diuresis with Lasix 20 BID to continue. start ACEI when BP allows   Bilateral Pleural Effusions ,   Hypomagnesemia- supplemented     DM-2-follow HgbA1c -  FS with HISS avoid hypoglycemia with procedure planned and low food intake     Weight loss and early satiety- could be CHF related but may need GI workup    CAD/HlD- cont statin and aspirin , per Cardio- positive calcification on CT Lcx and LAD    Anemia follow ferritin and TIBC, no bleeding check Stool Occult blood      GERD- cont PPI    CKD- stable - follow renal indices- low contrast during Cath- fluids if needed     DVT PPX- heparin     Anxiety low dose xanax PRN

## 2020-08-04 NOTE — CONSULT NOTE ADULT - SUBJECTIVE AND OBJECTIVE BOX
History of Present Illness:  HPI:  87y old  Male with PMH/ HFpEF, CAD, HTN, HLD, DM type 2 on oral hypoglycemics, CKD stage III; rib fractures complicated by L-sided pleural effusion, requiring thoracentesis ~2 years ago; B/L pleural effusions, s/p L thoracentesis- 01/02/20, transudative, chronic lightheadedness, GERD, who presents with a chief complaint of MEJÍA and gait instability. Pt states that since about 2 mos ago he has been feeling short of breath, more with exertion -- walking around his apartment or with bending to put his shoes on. Pt states that today his visiting nurse became concerned about his breathing, him getting "winded" with minimal exertion and abnormal lung sounds. Pt states that he has been feeling the same since about 2 mos ago. Pt states that sometimes he feels chest burning if he eats late or he eats heavy meal, especially meat. pt states that he lost about 10 lbs since about 2 mos ago due to has been taking Abx for L heal ulcer and his appetite has been poor. Pt states that his doctor discontinued BP medications 2/2 low BP. Pt states that he has been drinking 0.5 L bottles of water-- about 2-3 every day. Pt denies any CP/dizziness/diaphoresis/F/C/cough/abdominal pain/sick contacts/other complaints.   Above HPI per cardio  read and agreed with verified with patient -In Addition , he was started on Zinc per his PCP 2 weeks ago when he visited for SOB , he denied any edema or orthopnea , denied fever or cough or sick contact   States he is anxious about the cardiac cath and i explained the cath procedure , discussed that he might need thoracentesis with bilateral pleural effusions (04 Aug 2020 13:08)      Current Subjective Assessment: "I felt really winded" Denies CP but describes chest pressure with shortness of breath, worse with exertion. Pt complains of similar feeling over past 2-3 months.     Past Medical History  DM (diabetes mellitus)  HLD (hyperlipidemia)  HTN (hypertension)  PVCs (premature ventricular contractions)  CHF (congestive heart failure)      Past Surgical History  H/O right inguinal hernia repair      MEDICATIONS  (STANDING):  aspirin  chewable 81 milliGRAM(s) Oral daily  atorvastatin 40 milliGRAM(s) Oral at bedtime  dextrose 5%. 1000 milliLiter(s) (50 mL/Hr) IV Continuous <Continuous>  dextrose 50% Injectable 12.5 Gram(s) IV Push once  dextrose 50% Injectable 25 Gram(s) IV Push once  dextrose 50% Injectable 25 Gram(s) IV Push once  furosemide   Injectable 20 milliGRAM(s) IV Push two times a day  heparin   Injectable 5000 Unit(s) SubCutaneous every 8 hours  insulin lispro (HumaLOG) corrective regimen sliding scale   SubCutaneous three times a day before meals  lidocaine 1% Injectable 10 milliLiter(s) Local Injection once  pantoprazole    Tablet 40 milliGRAM(s) Oral before breakfast    MEDICATIONS  (PRN):  ALPRAZolam 0.25 milliGRAM(s) Oral daily PRN anxiety  dextrose 40% Gel 15 Gram(s) Oral once PRN Blood Glucose LESS THAN 70 milliGRAM(s)/deciliter  glucagon  Injectable 1 milliGRAM(s) IntraMuscular once PRN Glucose LESS THAN 70 milligrams/deciliter    Antiplatelet therapy:  aspirin                         Last dose/amt:    Allergies: No Known Allergies      SOCIAL HISTORY:  Smoker: [ ] Yes  [x ] No        PACK YEARS:                         WHEN QUIT?  ETOH use: [ ] Yes  [ x] No              FREQUENCY / QUANTITY:  Ilicit Drug use:  [ ] Yes  [ x] No  Occupation: retired in 2000  Live with: alone, daughters and girlfriend nearby  Assist device use: rolling walker    PMD: Dr aNtalie Gomez  Referring Cardiologist: [place]    Relevant Family History  FAMILY HISTORY: Father: heart attack  Mother unknown      Review of Systems  GENERAL:  Fevers[] chills[] sweats[] fatigue[x] weight loss[x d/t not feeling hungry] weight gain []                                      [ ] NEGATIVE  NEURO:  parathesias[] seizures []  syncope []  confusion []                                                                [ ] NEGATIVE                 EYES: glasses[]  blurry vision[]  discharge[] pain[] glaucoma []                                                           [ ] NEGATIVE                 ENMT:  difficulty hearing []  vertigo[]  dysphagia[] epistaxis[] recent dental work []                     [ ] NEGATIVE                 CV:  chest pain[] palpitations[] MEJÍA [] diaphoresis [] edema[]                                                           [ ] NEGATIVE                                 RESPIRATORY:  wheezing[] SOB[] cough [] sputum[] hemoptysis[]                                                   [ ] NEGATIVE               GI:  nausea[]  vomiting []  diarrhea[] constipation [] melena []                                                          [ ] NEGATIVE            : hematuria[ ]  dysuria[ ] urgency[] incontinence[]                                                                          [ ] NEGATIVE                    MUSKULOSKELETAL:  arthritis[ ]  joint swelling [ ] muscle weakness [ ]                                            [ ] NEGATIVE                     SKIN/BREAST:  rash[ ] itching [ ]  hair loss[ ] masses[ ]                                                                          [ ] NEGATIVE                     PSYCH:  dementia [ ] depression [ ] anxiety[ ]                                                                                          [ ] NEGATIVE                        HEME/LYMPH:  bruises easily[ ] enlarged lymph nodes[ ] tender lymph nodes[ ]                           [ ] NEGATIVE                      ENDOCRINE:  cold intolerance[ ] heat intolerance[ ] polydipsia[ ]                                                      [ ] NEGATIVE                        Telemetry: SR with frequent PVC/PACs    PHYSICAL EXAM  Vital Signs Last 24 Hrs  T(C): 36.6 (04 Aug 2020 15:37), Max: 37 (04 Aug 2020 03:14)  T(F): 97.8 (04 Aug 2020 15:37), Max: 98.6 (04 Aug 2020 03:14)  HR: 71 (04 Aug 2020 15:37) (65 - 90)  BP: 132/65 (04 Aug 2020 15:37) (97/53 - 132/65)  BP(mean): --  RR: 18 (04 Aug 2020 15:37) (18 - 23)  SpO2: 95% (04 Aug 2020 15:37) (94% - 99%) RA    General: Well nourished, well developed, no acute distress.                                                         Neuro: Normal exam oriented to person/place & time with no focal motor or sensory  deficits.                    Eyes: Normal exam of conjunctiva & lids, pupils equally reactive.   ENT: Normal exam of nasal/oral mucosa with absence of cyanosis.   Neck: Normal exam of jugular veins, trachea & thyroid.   Chest: diminished bilateral bases with few crackles bilaterally                                                                     CV:  Auscultation: normal [ ] S3[ ] S4[ ] Irregular [ x] Rub[ ] Clicks[ ]  Murmurs none:[x ]systolic [ ]  diastolic [ ] holosystolic [ ]  Carotids: No Bruits[x ] Other____________ Abdominal Aorta: normal [ ] nonpalpable[ ]                                                                         GI: Normal exam of abdomen, liver & spleen with no noted masses or tenderness.                                                                                              Extremities: Normal no evidence of cyanosis or deformity Edema: none[ ]trace[ ]1+[ ]2+[ ]3+[ ]4+[ ]  Lower Extremity Pulses: Right[ ] Left[ ]Varicosities[ ]  SKIN : Normal exam to inspection & palpation.                                                           LABS:                        8.9    5.42  )-----------( 173      ( 04 Aug 2020 07:49 )             31.8     08-04    142  |  104  |  41.0<H>  ----------------------------<  109<H>  3.4<L>   |  25.0  |  1.35<H>    Ca    7.9<L>      04 Aug 2020 07:49  Phos  1.5     08-04  Mg     1.8     08-04    TPro  6.0<L>  /  Alb  3.4  /  TBili  0.3<L>  /  DBili  x   /  AST  16  /  ALT  9   /  AlkPhos  107  08-04    PT/INR - ( 03 Aug 2020 20:46 )   PT: 13.6 sec;   INR: 1.18 ratio         PTT - ( 03 Aug 2020 20:46 )  PTT:29.9 sec    CARDIAC MARKERS ( 04 Aug 2020 07:49 )  x     / 0.04 ng/mL / x     / x     / x      CARDIAC MARKERS ( 04 Aug 2020 03:42 )  x     / 0.04 ng/mL / x     / x     / x      CARDIAC MARKERS ( 04 Aug 2020 01:24 )  x     / 0.04 ng/mL / 21 U/L / x     / x      CARDIAC MARKERS ( 03 Aug 2020 20:46 )  x     / 0.04 ng/mL / x     / x     / x        POC US: Bilateral effusions, right greater than left, left appears loculated    CT Chest: < from: CT Chest No Cont (08.04.20 @ 06:55) >    FINDINGS:    LUNGS AND AIRWAYS: Patent central airways.  Streaky bibasilar atelectasis. Partial atelectasis of the left lower lobe and lingula; correlate for superimposed pneumonia.  PLEURA: Moderate right pleural effusion. Small-to-moderate partially loculated left pleural effusion.  MEDIASTINUM AND DAVID: No lymphadenopathy.  VESSELS: Atherosclerosis.  HEART: Heart size is normal. No pericardial effusion. Coronary artery calcifications. Mitral annular calcifications.  CHEST WALL AND LOWER NECK: Within normal limits.  VISUALIZED UPPER ABDOMEN: Within normal limits.  BONES: Age-indeterminate left 12th and 11th rib fractures. Old left 8th, 9th and 10th rib fracture deformities. Degenerative changes in the spine.    IMPRESSION:  Moderate right pleural effusion. Small-to-moderate partially loculated left pleural effusion.    Partial atelectasis of the left lower lobe and lingula; correlate for superimposed pneumonia.      < end of copied text >      echo: < from: TTE Echo Complete w/o Contrast w/ Doppler (08.04.20 @ 08:49) >    Summary:   1. Left ventricular ejection fraction, by visual estimation, is 45 to 50%.   2. Mildly decreased global left ventricular systolic function.   3. Mildly increased LV wall thickness.   4. Normal left ventricular internal cavity size.   5. Spectral Doppler shows pseudonormal pattern of left ventricular myocardial filling (Grade II diastolic dysfunction).   6. Large pleural effusion in both left and right lateral regions.   7. There is no evidence of pericardial effusion.   8. Mild mitral valve regurgitation.   9. Thickening and calcification of the anterior and posterior mitral valve leaflets.  10. Moderate tricuspid regurgitation.  11. Estimated pulmonary artery systolic pressure is 55.3 mmHg assuming a right atrial pressure of 8 mmHg, which is consistent with moderate pulmonary hypertension.  12. Mitral valve mean gradient is 4.0 mmHg consistent with mild mitral stenosis.  13. The aortic valve mean gradient is 3.0 mmHg consistent with normally opening aortic valve.    MD Steffi Electronically signed on 8/4/2020 at 10:55:05 AM    < end of copied text > History of Present Illness:  HPI:  87y old  Male with PMH/ HFpEF, CAD, HTN, HLD, DM type 2 on oral hypoglycemics, CKD stage III; rib fractures complicated by L-sided pleural effusion, requiring thoracentesis ~2 years ago; B/L pleural effusions, s/p L thoracentesis- 01/02/20, transudative, chronic lightheadedness, GERD, who presents with a chief complaint of MEJÍA and gait instability. Pt states that since about 2 mos ago he has been feeling short of breath, more with exertion -- walking around his apartment or with bending to put his shoes on. Pt states that today his visiting nurse became concerned about his breathing, him getting "winded" with minimal exertion and abnormal lung sounds. Pt states that he has been feeling the same since about 2 mos ago. Pt states that sometimes he feels chest burning if he eats late or he eats heavy meal, especially meat. pt states that he lost about 10 lbs since about 2 mos ago due to has been taking Abx for L heal ulcer and his appetite has been poor. Pt states that his doctor discontinued BP medications 2/2 low BP. Pt states that he has been drinking 0.5 L bottles of water-- about 2-3 every day. Pt denies any CP/dizziness/diaphoresis/F/C/cough/abdominal pain/sick contacts/other complaints.   Above HPI per cardio  read and agreed with verified with patient -In [place] , he was started on Zinc per his PCP 2 weeks ago when he visited for SOB , he denied any edema or orthopnea , denied fever or cough or sick contact   States he is anxious about the cardiac cath and i explained the cath procedure , discussed that he might need thoracentesis with bilateral pleural effusions (04 Aug 2020 13:08)      Current Subjective Assessment: "I felt really winded" Denies CP but describes chest pressure with shortness of breath, worse with exertion. Pt complains of similar feeling over past 2-3 months.     Past Medical History  DM (diabetes mellitus)  HLD (hyperlipidemia)  HTN (hypertension)  PVCs (premature ventricular contractions)  CHF (congestive heart failure)      Past Surgical History  H/O right inguinal hernia repair      MEDICATIONS  (STANDING):  aspirin  chewable 81 milliGRAM(s) Oral daily  atorvastatin 40 milliGRAM(s) Oral at bedtime  dextrose 5%. 1000 milliLiter(s) (50 mL/Hr) IV Continuous <Continuous>  dextrose 50% Injectable 12.5 Gram(s) IV Push once  dextrose 50% Injectable 25 Gram(s) IV Push once  dextrose 50% Injectable 25 Gram(s) IV Push once  furosemide   Injectable 20 milliGRAM(s) IV Push two times a day  heparin   Injectable 5000 Unit(s) SubCutaneous every 8 hours  insulin lispro (HumaLOG) corrective regimen sliding scale   SubCutaneous three times a day before meals  lidocaine 1% Injectable 10 milliLiter(s) Local Injection once  pantoprazole    Tablet 40 milliGRAM(s) Oral before breakfast    MEDICATIONS  (PRN):  ALPRAZolam 0.25 milliGRAM(s) Oral daily PRN anxiety  dextrose 40% Gel 15 Gram(s) Oral once PRN Blood Glucose LESS THAN 70 milliGRAM(s)/deciliter  glucagon  Injectable 1 milliGRAM(s) IntraMuscular once PRN Glucose LESS THAN 70 milligrams/deciliter    Antiplatelet therapy:  aspirin                         Last dose/amt:    Allergies: No Known Allergies      SOCIAL HISTORY:  Smoker: [ ] Yes  [x ] No        PACK YEARS:                         WHEN QUIT?  ETOH use: [ ] Yes  [ x] No              FREQUENCY / QUANTITY:  Ilicit Drug use:  [ ] Yes  [ x] No  Occupation: retired in 2000  Live with: alone, daughters and girlfriend nearby  Assist device use: rolling walker    PMD: Dr Natalie Gomez  Referring Cardiologist: Theresa    Relevant Family History  FAMILY HISTORY: Father: heart attack  Mother unknown      Review of Systems  GENERAL:  Fevers[] chills[] sweats[] fatigue[x] weight loss[x d/t not feeling hungry] weight gain []                                      [ ] NEGATIVE  NEURO:  parathesias[] seizures []  syncope []  confusion []                                                                [x ] NEGATIVE                 EYES: glasses[]  blurry vision[]  discharge[] pain[] glaucoma []                                                           [x ] NEGATIVE                 ENMT:  difficulty hearing []  vertigo[]  dysphagia[] epistaxis[] recent dental work []                     [x ] NEGATIVE                 CV:  chest pain[] palpitations[] MEJÍA [] diaphoresis [] edema[]                                                           [x ] NEGATIVE                                 RESPIRATORY:  wheezing[] SOB[x] cough [] sputum[] hemoptysis[]                                                   [ ] NEGATIVE               GI:  nausea[]  vomiting []  diarrhea[] constipation [x] melena []                                                          [ ] NEGATIVE            : hematuria[ ]  dysuria[ ] urgency[] incontinence[]                                                                          [x ] NEGATIVE                    MUSKULOSKELETAL:  arthritis[ ]  joint swelling [ ] muscle weakness [ ]                                            [x ] NEGATIVE                     SKIN/BREAST:  rash[ ] itching [ ]  hair loss[ ] masses[ ]                                                                          [x ] NEGATIVE                     PSYCH:  dementia [ ] depression [ ] anxiety[ ]                                                                                          [x] NEGATIVE                        HEME/LYMPH:  bruises easily[ ] enlarged lymph nodes[ ] tender lymph nodes[ ]                           [x ] NEGATIVE                      ENDOCRINE:  cold intolerance[ ] heat intolerance[ ] polydipsia[ ]                                                      [x ] NEGATIVE                        Telemetry: SR with frequent PVC/PACs    PHYSICAL EXAM  Vital Signs Last 24 Hrs  T(C): 36.6 (04 Aug 2020 15:37), Max: 37 (04 Aug 2020 03:14)  T(F): 97.8 (04 Aug 2020 15:37), Max: 98.6 (04 Aug 2020 03:14)  HR: 71 (04 Aug 2020 15:37) (65 - 90)  BP: 132/65 (04 Aug 2020 15:37) (97/53 - 132/65)  BP(mean): --  RR: 18 (04 Aug 2020 15:37) (18 - 23)  SpO2: 95% (04 Aug 2020 15:37) (94% - 99%) RA    General: Well nourished, well developed, no acute distress.                                                         Neuro: Normal exam oriented to person/place & time with no focal motor or sensory  deficits.                    Eyes: Normal exam of conjunctiva & lids, pupils equally reactive.   ENT: Normal exam of nasal/oral mucosa with absence of cyanosis.   Neck: Normal exam of jugular veins, trachea & thyroid.   Chest: diminished bilateral bases with few crackles bilaterally                                                                     CV:  Auscultation: normal [ ] S3[ ] S4[ ] Irregular [ x] Rub[ ] Clicks[ ]  Murmurs none:[x ]systolic [ ]  diastolic [ ] holosystolic [ ]  Carotids: No Bruits[x ] Other____________ Abdominal Aorta: normal [x] nonpalpable[ ]                                                                         GI: Normal exam of abdomen, liver & spleen with no noted masses or tenderness.                                                                                              Extremities: Normal no evidence of cyanosis or deformity Edema: none[R with signs of significant PVD ]trace[ ]1+[L with chronic heel wound ]2+[ ]3+[ ]4+[ ]  Lower Extremity Pulses: Right[+ ] Left[ +]Varicosities[ x]  SKIN : Normal exam to inspection & palpation.                                                           LABS:                        8.9    5.42  )-----------( 173      ( 04 Aug 2020 07:49 )             31.8     08-04    142  |  104  |  41.0<H>  ----------------------------<  109<H>  3.4<L>   |  25.0  |  1.35<H>    Ca    7.9<L>      04 Aug 2020 07:49  Phos  1.5     08-04  Mg     1.8     08-04    TPro  6.0<L>  /  Alb  3.4  /  TBili  0.3<L>  /  DBili  x   /  AST  16  /  ALT  9   /  AlkPhos  107  08-04    PT/INR - ( 03 Aug 2020 20:46 )   PT: 13.6 sec;   INR: 1.18 ratio         PTT - ( 03 Aug 2020 20:46 )  PTT:29.9 sec    CARDIAC MARKERS ( 04 Aug 2020 07:49 )  x     / 0.04 ng/mL / x     / x     / x      CARDIAC MARKERS ( 04 Aug 2020 03:42 )  x     / 0.04 ng/mL / x     / x     / x      CARDIAC MARKERS ( 04 Aug 2020 01:24 )  x     / 0.04 ng/mL / 21 U/L / x     / x      CARDIAC MARKERS ( 03 Aug 2020 20:46 )  x     / 0.04 ng/mL / x     / x     / x        POC US: Bilateral effusions, right greater than left, left appears loculated    CT Chest: < from: CT Chest No Cont (08.04.20 @ 06:55) >    FINDINGS:    LUNGS AND AIRWAYS: Patent central airways.  Streaky bibasilar atelectasis. Partial atelectasis of the left lower lobe and lingula; correlate for superimposed pneumonia.  PLEURA: Moderate right pleural effusion. Small-to-moderate partially loculated left pleural effusion.  MEDIASTINUM AND DAVID: No lymphadenopathy.  VESSELS: Atherosclerosis.  HEART: Heart size is normal. No pericardial effusion. Coronary artery calcifications. Mitral annular calcifications.  CHEST WALL AND LOWER NECK: Within normal limits.  VISUALIZED UPPER ABDOMEN: Within normal limits.  BONES: Age-indeterminate left 12th and 11th rib fractures. Old left 8th, 9th and 10th rib fracture deformities. Degenerative changes in the spine.    IMPRESSION:  Moderate right pleural effusion. Small-to-moderate partially loculated left pleural effusion.    Partial atelectasis of the left lower lobe and lingula; correlate for superimposed pneumonia.      < end of copied text >      echo: < from: TTE Echo Complete w/o Contrast w/ Doppler (08.04.20 @ 08:49) >    Summary:   1. Left ventricular ejection fraction, by visual estimation, is 45 to 50%.   2. Mildly decreased global left ventricular systolic function.   3. Mildly increased LV wall thickness.   4. Normal left ventricular internal cavity size.   5. Spectral Doppler shows pseudonormal pattern of left ventricular myocardial filling (Grade II diastolic dysfunction).   6. Large pleural effusion in both left and right lateral regions.   7. There is no evidence of pericardial effusion.   8. Mild mitral valve regurgitation.   9. Thickening and calcification of the anterior and posterior mitral valve leaflets.  10. Moderate tricuspid regurgitation.  11. Estimated pulmonary artery systolic pressure is 55.3 mmHg assuming a right atrial pressure of 8 mmHg, which is consistent with moderate pulmonary hypertension.  12. Mitral valve mean gradient is 4.0 mmHg consistent with mild mitral stenosis.  13. The aortic valve mean gradient is 3.0 mmHg consistent with normally opening aortic valve.    MD Steffi Electronically signed on 8/4/2020 at 10:55:05 AM    < end of copied text >

## 2020-08-04 NOTE — ED ADULT NURSE REASSESSMENT NOTE - COMFORT CARE
meal provided
warm blanket provided/Incontinent care provided linen change.
meal provided/wait time explained/plan of care explained/assisted to bathroom/po fluids offered
assisted to bathroom/meal provided/wait time explained/plan of care explained/po fluids offered

## 2020-08-04 NOTE — ED CDU PROVIDER DISPOSITION NOTE - CLINICAL COURSE
86 y/o male with multiple medical problems placed in observation for MEJÍA / CP. Evaluated by cardio who is recommending cardiac cath.  will admit

## 2020-08-04 NOTE — CONSULT NOTE ADULT - ASSESSMENT
Patient is a 87y old  Male who presents with a chief complaint of MEJÍA. Pt states that since about 2 mos ago he has been feeling short of breath, more with exertion walking -- around his apartment or with bending to put his shoes on.     # acute on chronic diastolic heart failure   -- Trop x 2 and CK negative,   -- CXR- B/L pleural effusions, -- official CXR report-- pending,   -- ProBNP- 27910  -- pt with chronic shortness of breath with worsening exertional dyspnea- -on Torsemide 10 mg at home-- pt was medicated with 20 mg of IV Lasix at home-- continue Lasix 20 mg IVP BID if BP tolerates, hold for SBP<90,   --TTE ordered-- to evaluate EF and for structural heart disease,   -- pt is in no acute respiratory distress, does not require any Oxygen, resting comfortably on RA,   -- strict intake and output, continue monitoring,   --     # CAD_ on ASA 81 mg, Lipitor,   -- BP medications were discontinued by outpt cardiologist-- pt could not tolerate Lopressor due to feeling lethargic and dizzy,   -- Benazepril was discontinued due to low BP,     #CKD stage 3-- strict intake and output (pt states that urinates at home regularly-- about 3 times during the day  and sometimes 1-2 times at night,     # pleural effusions-  -- official CXR results pending,   -- CT chest ordered to evaluate pleural effusions, consider pulmonary consultation,     # LLE swelling  -- venous duplex of LLE -- no DVT,     # Exertional MEJÍA,  minimal exercise:    -- fall precautions  -- PT consultation Patient is a 87y old  Male who presents with a chief complaint of MEJÍA. Pt states that since about 2 mos ago he has been feeling short of breath, more with exertion walking -- around his apartment or with bending to put his shoes on.     # acute on chronic diastolic heart failure   -- Trop x 0.04 x 2 and CK negative, > serial Trop, serial ECG's   -- CXR- B/L pleural effusions, -- official CXR report-- pending,   -- ProBNP- 70624  -- pt with chronic shortness of breath with worsening exertional dyspnea- -on Torsemide 10 mg at home-- pt was medicated with 20 mg of IV Lasix at home-- continue Lasix 20 mg IVP BID if BP tolerates, hold for SBP<90,   --TTE ordered-- to evaluate EF and for structural heart disease,   -- pt is in no acute respiratory distress, does not require any Oxygen, resting comfortably on RA,   -- strict intake and output, continue monitoring,   -- Mg- 1.9 -- supplemented,   --     # CAD_ on ASA 81 mg, Lipitor 40 mg qhs,   -- BP medications were discontinued by outpt cardiologist-- pt could not tolerate Lopressor due to feeling lethargic and dizzy,   -- Benazepril was discontinued due to low BP,   -- Lipid panel in am,     #CKD stage 3-- strict intake and output (pt states that urinates at home regularly-- about 3 times during the day  and sometimes 1-2 times at night,   -- BUN/Creat- 44/1.45,     # pleural effusions-  -- official CXR results pending,   -- CT chest ordered to evaluate pleural effusions, consider pulmonary consultation,     # LLE swelling  -- venous duplex of LLE -- no DVT,    #enemia  -- H/H- 9/34, prior- 11.8/40.9 in 01/2020  -- no s/s active bleedings, continue monitoring      #DM type 2, diet controlled,   -- HgbA1C in am,     # Exertional MEJÍA with minimal exercise:    -- fall precautions  -- PT consultation Patient is a 87y old  Male who presents with a chief complaint of MEJÍA. Pt states that since about 2 mos ago he has been feeling short of breath, more with exertion walking -- around his apartment or with bending to put his shoes on.     # acute on chronic diastolic heart failure   -- Trop x 0.04 x 2 and CK negative, > serial Trop, serial ECG's   -- CXR- B/L pleural effusions, -- official CXR report-- pending,   -- ProBNP- 09148  -- pt with chronic shortness of breath with worsening exertional dyspnea- -on Torsemide 10 mg at home-- pt was medicated with 20 mg of IV Lasix at home-- continue Lasix 20 mg IVP BID if BP tolerates, hold for SBP<90,   --TTE ordered-- to evaluate EF and for structural heart disease,   -- pt is in no acute respiratory distress, does not require any Oxygen, resting comfortably on RA,   -- strict intake and output, continue monitoring,   -- Mg- 1.9 -- supplemented,   --     # CAD_ on ASA 81 mg, Lipitor 40 mg qhs,   -- BP medications were discontinued by outpt cardiologist-- pt could not tolerate Lopressor due to feeling lethargic and dizzy,   -- Benazepril was discontinued due to low BP,   -- Lipid panel in am,     #CKD stage 3-- strict intake and output (pt states that urinates at home regularly-- about 3 times during the day  and sometimes 1-2 times at night,   -- BUN/Creat- 44/1.45, Creat- 1.8- 08/2019 and 01/2020,     # pleural effusions-  -- official CXR results pending,   -- CT chest ordered to evaluate pleural effusions, consider pulmonary consultation,     # LLE swelling  -- venous duplex of LLE -- no DVT,    #enemia  -- H/H- 9/34, prior- 11.8/40.9 in 01/2020  -- no s/s active bleedings, continue monitoring      #DM type 2, on orql hypoglycemics  -- serum glu- 124, FS- 179   -- HgbA1C in am, ISS in place, FS qac and qhs,     # Exertional MEJÍA with minimal exercise:    -- fall precautions  -- PT consultation Patient is a 87y old  Male who presents with a chief complaint of MEJÍA. Pt states that since about 2 mos ago he has been feeling short of breath, more with exertion walking -- around his apartment or with bending to put his shoes on.     # acute on chronic diastolic heart failure   -- Trop x 0.04 x 2 and CK negative, > serial Trop, serial ECG's   -- CXR- B/L pleural effusions, -- official CXR report-- pending,   -- ProBNP- 11460  -- pt with chronic shortness of breath with worsening exertional dyspnea- -on Torsemide 10 mg at home-- pt was medicated with 20 mg of IV Lasix at home-- continue Lasix 20 mg IVP BID if BP tolerates, hold for SBP<90,   --TTE ordered-- to evaluate EF and for structural heart disease,   -- pt is in no acute respiratory distress, does not require any Oxygen, resting comfortably on RA,   -- strict intake and output, continue monitoring,   -- Mg- 1.9 -- supplemented,   --     # CAD_ on ASA 81 mg, Lipitor 40 mg qhs,   -- BP medications were discontinued by outpt cardiologist-- pt could not tolerate Lopressor due to feeling lethargic and dizzy,   -- Benazepril was discontinued due to low BP,   -- Lipid panel in am, increased Lipitor to 40 mg daily, f/u lipid panel and titrate accordingly,      #CKD stage 3-- strict intake and output (pt states that urinates at home regularly-- about 3 times during the day  and sometimes 1-2 times at night,   -- BUN/Creat- 44/1.45, Creat- 1.8- 08/2019 and 01/2020,     # pleural effusions-  -- official CXR results pending,   -- CT chest ordered to evaluate pleural effusions, consider pulmonary consultation,     # LLE swelling  -- venous duplex of LLE -- no DVT,    #enemia  -- H/H- 9/34, prior- 11.8/40.9 in 01/2020  -- no s/s active bleedings, continue monitoring      #DM type 2, on orql hypoglycemics  -- serum glu- 124, FS- 179   -- HgbA1C in am, ISS in place, FS qac and qhs,     # Exertional MEJÍA with minimal exercise:    -- fall precautions  -- PT consultation Patient is a 87y old  Male who presents with a chief complaint of MEJÍA. Pt states that since about 2 mos ago he has been feeling short of breath, more with exertion walking -- around his apartment or with bending to put his shoes on.     # acute on chronic diastolic heart failure   -- Trop x 0.04 x 2 and CK negative, > serial Trop, serial ECG's   -- CXR- B/L pleural effusions, -- official CXR report-- pending,   -- ProBNP- 58831  -- pt with chronic shortness of breath with worsening exertional dyspnea- -on Torsemide 10 mg at home-- pt was medicated with 20 mg of IV Lasix at home-- continue Lasix 20 mg IVP BID if BP tolerates, hold for SBP<90,   --TTE ordered-- to evaluate EF and for structural heart disease,   -- pt is in no acute respiratory distress, does not require any Oxygen, resting comfortably on RA,   -- strict intake and output, continue monitoring,   -- Mg- 1.9 -- supplemented,   --     # CAD_ on ASA 81 mg, Lipitor 40 mg qhs,   -- BP medications were discontinued by outpt cardiologist-- pt could not tolerate Lopressor due to feeling lethargic and dizzy,   -- Benazepril was discontinued due to low BP,   -- Lipid panel in am, increased Lipitor to 40 mg daily, f/u lipid panel and titrate accordingly,      #CKD stage 3-- strict intake and output (pt states that urinates at home regularly-- about 3 times during the day  and sometimes 1-2 times at night,   -- BUN/Creat- 44/1.45, Creat- 1.8- 08/2019 and 01/2020,     # pleural effusions-  -- official CXR results pending,   -- CT chest ordered to evaluate pleural effusions, consider pulmonary consultation,     # LLE swelling  -- venous duplex of LLE -- no DVT,    #enemia  -- H/H- 9/34, prior- 11.8/40.9 in 01/2020  -- no s/s active bleedings, continue monitoring      #DM type 2, on orql hypoglycemics  -- serum glu- 124, FS- 179   -- HgbA1C in am, ISS in place, FS qac and qhs,     # Exertional MEJÍA with minimal exercise, gait instability, pt has been using walker:    -- fall precautions  -- PT consultation     #GERD  -- continue Protonix

## 2020-08-04 NOTE — CONSULT NOTE ADULT - PROBLEM SELECTOR RECOMMENDATION 9
Right chest drain placed - to be maintained overnight and then possibly removed.   Cardio treating primary cause  Continue diuretics  Follow up fluid analysis  Post-pigtail chest xray pending

## 2020-08-04 NOTE — PROVIDER CONTACT NOTE (OTHER) - RECOMMENDATIONS
home with PT/assistance. Pt feels that between his daughters and girlfriend, he will have temporary assistance until he is stronger or has more assistance in the home.

## 2020-08-04 NOTE — CONSULT NOTE ADULT - ASSESSMENT
87y old  Male with PMH/ HFpEF, CAD, HTN, HLD, DM type 2 on oral hypoglycemics, CKD stage III; rib fractures complicated by L-sided pleural effusion, requiring thoracentesis ~2 years ago; B/L pleural effusions, s/p L thoracentesis- 01/02/20, transudative, chronic lightheadedness, GERD, who presents with a chief complaint of MEJÍA and gait instability.    Left thora in past x 2 and L effusion noted in January. On CT scan and POCUS, L effusion appears loculated. Right effusion moderate and free flowing on CT chest and POCUS. Right pigtail catheter placed at the bedside under local anesthesia. Patient tolerated procedure well. Over 1 liter serous fluid drained from tube and tube clamped to avoid reexpansion pulmonary edema. Fluid sent for analysis and cytology.

## 2020-08-05 ENCOUNTER — RESULT REVIEW (OUTPATIENT)
Age: 85
End: 2020-08-05

## 2020-08-05 ENCOUNTER — TRANSCRIPTION ENCOUNTER (OUTPATIENT)
Age: 85
End: 2020-08-05

## 2020-08-05 LAB
ALBUMIN FLD-MCNC: 1.6 G/DL — SIGNIFICANT CHANGE UP
ANION GAP SERPL CALC-SCNC: 12 MMOL/L — SIGNIFICANT CHANGE UP (ref 5–17)
BUN SERPL-MCNC: 43 MG/DL — HIGH (ref 8–20)
CALCIUM SERPL-MCNC: 8.3 MG/DL — LOW (ref 8.6–10.2)
CHLORIDE SERPL-SCNC: 99 MMOL/L — SIGNIFICANT CHANGE UP (ref 98–107)
CO2 SERPL-SCNC: 29 MMOL/L — SIGNIFICANT CHANGE UP (ref 22–29)
CREAT SERPL-MCNC: 1.72 MG/DL — HIGH (ref 0.5–1.3)
GLUCOSE BLDC GLUCOMTR-MCNC: 151 MG/DL — HIGH (ref 70–99)
GLUCOSE BLDC GLUCOMTR-MCNC: 171 MG/DL — HIGH (ref 70–99)
GLUCOSE BLDC GLUCOMTR-MCNC: 182 MG/DL — HIGH (ref 70–99)
GLUCOSE BLDC GLUCOMTR-MCNC: 278 MG/DL — HIGH (ref 70–99)
GLUCOSE FLD-MCNC: 145 MG/DL — SIGNIFICANT CHANGE UP
GLUCOSE SERPL-MCNC: 206 MG/DL — HIGH (ref 70–99)
HCT VFR BLD CALC: 34.5 % — LOW (ref 39–50)
HGB BLD-MCNC: 9.6 G/DL — LOW (ref 13–17)
LDH SERPL L TO P-CCNC: 64 U/L — SIGNIFICANT CHANGE UP
MAGNESIUM SERPL-MCNC: 1.8 MG/DL — SIGNIFICANT CHANGE UP (ref 1.6–2.6)
MCHC RBC-ENTMCNC: 24.1 PG — LOW (ref 27–34)
MCHC RBC-ENTMCNC: 27.8 GM/DL — LOW (ref 32–36)
MCV RBC AUTO: 86.7 FL — SIGNIFICANT CHANGE UP (ref 80–100)
PLATELET # BLD AUTO: 193 K/UL — SIGNIFICANT CHANGE UP (ref 150–400)
POTASSIUM SERPL-MCNC: 3.8 MMOL/L — SIGNIFICANT CHANGE UP (ref 3.5–5.3)
POTASSIUM SERPL-SCNC: 3.8 MMOL/L — SIGNIFICANT CHANGE UP (ref 3.5–5.3)
PROT FLD-MCNC: 2.7 G/DL — SIGNIFICANT CHANGE UP
RBC # BLD: 3.98 M/UL — LOW (ref 4.2–5.8)
RBC # FLD: 23.7 % — HIGH (ref 10.3–14.5)
SARS-COV-2 IGG SERPL QL IA: NEGATIVE — SIGNIFICANT CHANGE UP
SARS-COV-2 IGM SERPL IA-ACNC: 0.47 INDEX — SIGNIFICANT CHANGE UP
SODIUM SERPL-SCNC: 140 MMOL/L — SIGNIFICANT CHANGE UP (ref 135–145)
WBC # BLD: 5.5 K/UL — SIGNIFICANT CHANGE UP (ref 3.8–10.5)
WBC # FLD AUTO: 5.5 K/UL — SIGNIFICANT CHANGE UP (ref 3.8–10.5)

## 2020-08-05 PROCEDURE — 88305 TISSUE EXAM BY PATHOLOGIST: CPT | Mod: 26

## 2020-08-05 PROCEDURE — 71045 X-RAY EXAM CHEST 1 VIEW: CPT | Mod: 26

## 2020-08-05 PROCEDURE — 99233 SBSQ HOSP IP/OBS HIGH 50: CPT

## 2020-08-05 PROCEDURE — 93458 L HRT ARTERY/VENTRICLE ANGIO: CPT | Mod: 26

## 2020-08-05 PROCEDURE — 99231 SBSQ HOSP IP/OBS SF/LOW 25: CPT

## 2020-08-05 PROCEDURE — 99232 SBSQ HOSP IP/OBS MODERATE 35: CPT

## 2020-08-05 PROCEDURE — 99152 MOD SED SAME PHYS/QHP 5/>YRS: CPT

## 2020-08-05 PROCEDURE — 88112 CYTOPATH CELL ENHANCE TECH: CPT | Mod: 26

## 2020-08-05 RX ORDER — ATORVASTATIN CALCIUM 80 MG/1
1 TABLET, FILM COATED ORAL
Qty: 0 | Refills: 0 | DISCHARGE

## 2020-08-05 RX ORDER — SITAGLIPTIN 50 MG/1
1 TABLET, FILM COATED ORAL
Qty: 0 | Refills: 0 | DISCHARGE

## 2020-08-05 RX ORDER — SODIUM CHLORIDE 9 MG/ML
60 INJECTION INTRAMUSCULAR; INTRAVENOUS; SUBCUTANEOUS ONCE
Refills: 0 | Status: COMPLETED | OUTPATIENT
Start: 2020-08-05 | End: 2020-08-05

## 2020-08-05 RX ORDER — SODIUM CHLORIDE 9 MG/ML
200 INJECTION INTRAMUSCULAR; INTRAVENOUS; SUBCUTANEOUS ONCE
Refills: 0 | Status: COMPLETED | OUTPATIENT
Start: 2020-08-05 | End: 2020-08-05

## 2020-08-05 RX ORDER — BRIMONIDINE TARTRATE, TIMOLOL MALEATE 2; 5 MG/ML; MG/ML
1 SOLUTION/ DROPS OPHTHALMIC
Qty: 0 | Refills: 0 | DISCHARGE

## 2020-08-05 RX ORDER — PANTOPRAZOLE SODIUM 20 MG/1
1 TABLET, DELAYED RELEASE ORAL
Qty: 0 | Refills: 0 | DISCHARGE

## 2020-08-05 RX ORDER — LATANOPROST 0.05 MG/ML
1 SOLUTION/ DROPS OPHTHALMIC; TOPICAL
Qty: 0 | Refills: 0 | DISCHARGE

## 2020-08-05 RX ADMIN — Medication 81 MILLIGRAM(S): at 15:13

## 2020-08-05 RX ADMIN — Medication 0.25 MILLIGRAM(S): at 15:35

## 2020-08-05 RX ADMIN — ATORVASTATIN CALCIUM 40 MILLIGRAM(S): 80 TABLET, FILM COATED ORAL at 21:06

## 2020-08-05 RX ADMIN — Medication 20 MILLIGRAM(S): at 19:25

## 2020-08-05 RX ADMIN — PANTOPRAZOLE SODIUM 40 MILLIGRAM(S): 20 TABLET, DELAYED RELEASE ORAL at 04:55

## 2020-08-05 RX ADMIN — SODIUM CHLORIDE 50 MILLILITER(S): 9 INJECTION INTRAMUSCULAR; INTRAVENOUS; SUBCUTANEOUS at 18:49

## 2020-08-05 RX ADMIN — SODIUM CHLORIDE 60 MILLILITER(S): 9 INJECTION INTRAMUSCULAR; INTRAVENOUS; SUBCUTANEOUS at 16:30

## 2020-08-05 RX ADMIN — HEPARIN SODIUM 5000 UNIT(S): 5000 INJECTION INTRAVENOUS; SUBCUTANEOUS at 04:55

## 2020-08-05 RX ADMIN — Medication 2: at 08:12

## 2020-08-05 RX ADMIN — HEPARIN SODIUM 5000 UNIT(S): 5000 INJECTION INTRAVENOUS; SUBCUTANEOUS at 21:06

## 2020-08-05 RX ADMIN — Medication 2: at 19:24

## 2020-08-05 RX ADMIN — Medication 20 MILLIGRAM(S): at 04:55

## 2020-08-05 RX ADMIN — Medication 2: at 12:04

## 2020-08-05 NOTE — DISCHARGE NOTE NURSING/CASE MANAGEMENT/SOCIAL WORK - NSDCFUADDAPPT_GEN_ALL_CORE_FT
PHARMACY:  [facility] [address]  THE PT. DOES NOT HAVE ANY DIFFICULTIES IN OBTAINING OR TAKING HIS MEDICATIONS      CARDIOLOGIST:  DR. JOHN POWERS  [address]  [facility], [address] [phone]  FOLLOW UP APPOINTMENT WAS SCHEDULED FOR PHARMACY:  [facility] [address], [place]  THE PT. DOES NOT HAVE ANY DIFFICULTIES IN OBTAINING OR TAKING HIS MEDICATIONS      CARDIOLOGIST:  DR. JOHN POWERS  [address]  [facility], [address] [phone]  FOLLOW UP APPOINTMENT WAS SCHEDULED FOR 8/14/2020 at 9:15 AM  IN THE [place] OFFICE  CALL [phone] WHEN YOU ARRIVE AND THE OFFICE WILL LET YOU KNOW WHEN YOU CAN GO INSIDE PHARMACY:  [facility] [address]  THE PT. DOES NOT HAVE ANY DIFFICULTIES IN OBTAINING OR TAKING HIS MEDICATIONS      CARDIOLOGIST:  DR. JOHN POWERS  [address]  [facility], [address]  [phone]  FOLLOW UP APPOINTMENT WAS SCHEDULED FOR 8/14/2020 at 9:15 AM  IN THE [place] OFFICE  CALL [phone] WHEN YOU ARRIVE AND THE OFFICE WILL LET YOU KNOW WHEN YOU CAN GO INSIDE  PER DR. RASMUSSEN PT DECLINED JANAE WANTS TO GO HOME. PLAN TO D/C WITH [facility], REQUESTING SOC 8/13/2020. PER [facility] CONVERSATION WITH PT'S DAUGHTER BORIS REAGAN  [phone] WITH PT'S PERMISSION, PRIVATE PAID AIDE WILL BE AVAILABLE FOR PATIENT AT HOME. DAUGHTER ALSO REQUEST [employer] TRANSPORT FOR 4 PM TODAY.

## 2020-08-05 NOTE — PROGRESS NOTE ADULT - SUBJECTIVE AND OBJECTIVE BOX
Nurse Practitioner Progress note:   s/p C   < from: Cardiac Cath Lab - Adult (08.05.20 @ 16:42) >  VENTRICLES: No left ventriculogram was performed.  CORONARY VESSELS: The coronary circulation is right dominant.  LM:   --  LM: The vessel was normal sized. Angiography showed minor luminal  irregularities with no flow limiting lesions.  LAD:   --  LAD: The vessel was normal sized and moderately calcified.  Angiography showed moderate atherosclerosis.  --  Mid LAD: There was a discrete 50 % stenosis.  CX:   --  Circumflex: The vessel was large sized (dominant).  --  Ostial circumflex: There was a discrete 40 % stenosis.  --  Distal circumflex: There was a discrete 60 % stenosis. It does not  appear amenable to intervention.  --  OM1: The vessel was normal sized. There was a diffuse 40 % stenosis.  RCA:   --  RCA: Normal. The vessel was very small sized.  COMPLICATIONS: No complications occurred during the cath lab visit.  DIAGNOSTIC RECOMMENDATIONS: Overall non-obstructive CAD. Would maximize  medical therapy for diastolic CHF and CAD.  Prepared and signed by  Eliel Powell MD  Signed 08/05/2020 17:11:28    < end of copied text >        Patient feels well.  Denies chest pain, shortness of breath, dizziness or palpitations at this time    Right radial hemoband in place.  Procedure site CDI, no bleeding, no hematoma, able to move all digits with capillary refill < 3 seconds, fingers warm      T(C): 36.6 (08-05-20 @ 16:10), Max: 36.6 (08-05-20 @ 16:10)  HR: 84 (08-05-20 @ 17:15) (77 - 94)  BP: 113/64 (08-05-20 @ 17:15) (113/64 - 143/62)  RR: 18 (08-05-20 @ 17:15) (17 - 18)  SpO2: 98% (08-05-20 @ 17:15) (98% - 100%)    CBC Full  -  ( 05 Aug 2020 07:54 )  WBC Count : 5.50 K/uL  RBC Count : 3.98 M/uL  Hemoglobin : 9.6 g/dL  Hematocrit : 34.5 %  Platelet Count - Automated : 193 K/uL  Mean Cell Volume : 86.7 fl  Mean Cell Hemoglobin : 24.1 pg  Mean Cell Hemoglobin Concentration : 27.8 gm/dL    08-05    140  |  99  |  43.0<H>  ----------------------------<  206<H>  3.8   |  29.0  |  1.72<H>    Ca    8.3<L>      05 Aug 2020 07:54  Phos  1.5     08-04  Mg     1.8     08-05    TPro  6.0<L>  /  Alb  3.4  /  TBili  0.3<L>  /  DBili  x   /  AST  16  /  ALT  9   /  AlkPhos  107  08-04    CARDIAC MARKERS ( 04 Aug 2020 07:49 )  x     / 0.04 ng/mL / x     / x     / x      CARDIAC MARKERS ( 04 Aug 2020 03:42 )  x     / 0.04 ng/mL / x     / x     / x      CARDIAC MARKERS ( 04 Aug 2020 01:24 )  x     / 0.04 ng/mL / 21 U/L / x     / x      CARDIAC MARKERS ( 03 Aug 2020 20:46 )  x     / 0.04 ng/mL / x     / x     / x              MEDICATIONS  (STANDING):  aspirin  chewable 81 milliGRAM(s) Oral daily  atorvastatin 40 milliGRAM(s) Oral at bedtime  dextrose 5%. 1000 milliLiter(s) (50 mL/Hr) IV Continuous <Continuous>  dextrose 50% Injectable 12.5 Gram(s) IV Push once  dextrose 50% Injectable 25 Gram(s) IV Push once  dextrose 50% Injectable 25 Gram(s) IV Push once  furosemide   Injectable 20 milliGRAM(s) IV Push two times a day  heparin   Injectable 5000 Unit(s) SubCutaneous every 8 hours  insulin lispro (HumaLOG) corrective regimen sliding scale   SubCutaneous three times a day before meals  lidocaine 1% Injectable 10 milliLiter(s) Local Injection once  pantoprazole    Tablet 40 milliGRAM(s) Oral before breakfast    MEDICATIONS  (PRN):  ALPRAZolam 0.25 milliGRAM(s) Oral daily PRN anxiety  dextrose 40% Gel 15 Gram(s) Oral once PRN Blood Glucose LESS THAN 70 milliGRAM(s)/deciliter  glucagon  Injectable 1 milliGRAM(s) IntraMuscular once PRN Glucose LESS THAN 70 milligrams/deciliter        HPI:  87y old  Male with PMH/ HFpEF, CAD, HTN, HLD, DM type 2 on oral hypoglycemics, CKD stage III; rib fractures complicated by L-sided pleural effusion, requiring thoracentesis ~2 years ago; B/L pleural effusions, s/p L thoracentesis- 01/02/20, transudative, chronic lightheadedness, GERD, who presents with a chief complaint of MEJÍA and gait instability. Pt states that since about 2 mos ago he has been feeling short of breath, more with exertion -- walking around his apartment or with bending to put his shoes on. Pt states that today his visiting nurse became concerned about his breathing, him getting "winded" with minimal exertion and abnormal lung sounds. Pt states that he has been feeling the same since about 2 mos ago. Pt states that sometimes he feels chest burning if he eats late or he eats heavy meal, especially meat. pt states that he lost about 10 lbs since about 2 mos ago due to has been taking Abx for L heal ulcer and his appetite has been poor. Pt states that his doctor discontinued BP medications 2/2 low BP. Pt states that he has been drinking 0.5 L bottles of water-- about 2-3 every day. Pt denies any CP/dizziness/diaphoresis/F/C/cough/abdominal pain/sick contacts/other complaints.   Above HPI per cardio  read and agreed with verified with patient -In [place] , he was started on Zinc per his PCP 2 weeks ago when he visited for SOB , he denied any edema or orthopnea , denied fever or cough or sick contact   States he is anxious about the cardiac cath and i explained the cath procedure , discussed that he might need thoracentesis with bilateral pleural effusions (04 Aug 2020 13:08)    now s/p [facility]      ASSESSMENT/PLAN:    R/O Coronary artery disease  -Coronary artery disease  -Recover patient for 3 hours  -Resume po diet  -Activity as per team  -Resume all medications Nurse Practitioner Progress note:   s/p C   < from: Cardiac Cath Lab - Adult (08.05.20 @ 16:42) >  VENTRICLES: No left ventriculogram was performed.  CORONARY VESSELS: The coronary circulation is right dominant.  LM:   --  LM: The vessel was normal sized. Angiography showed minor luminal  irregularities with no flow limiting lesions.  LAD:   --  LAD: The vessel was normal sized and moderately calcified.  Angiography showed moderate atherosclerosis.  --  Mid LAD: There was a discrete 50 % stenosis.  CX:   --  Circumflex: The vessel was large sized (dominant).  --  Ostial circumflex: There was a discrete 40 % stenosis.  --  Distal circumflex: There was a discrete 60 % stenosis. It does not  appear amenable to intervention.  --  OM1: The vessel was normal sized. There was a diffuse 40 % stenosis.  RCA:   --  RCA: Normal. The vessel was very small sized.  COMPLICATIONS: No complications occurred during the cath lab visit.  DIAGNOSTIC RECOMMENDATIONS: Overall non-obstructive CAD. Would maximize  medical therapy for diastolic CHF and CAD.  Prepared and signed by  Eliel Powell MD  Signed 08/05/2020 17:11:28    < end of copied text >        Patient feels well.  Denies chest pain, shortness of breath, dizziness or palpitations at this time    Right radial hemoband in place.  Procedure site CDI, no bleeding, no hematoma, able to move all digits with capillary refill < 3 seconds, fingers warm      T(C): 36.6 (08-05-20 @ 16:10), Max: 36.6 (08-05-20 @ 16:10)  HR: 84 (08-05-20 @ 17:15) (77 - 94)  BP: 113/64 (08-05-20 @ 17:15) (113/64 - 143/62)  RR: 18 (08-05-20 @ 17:15) (17 - 18)  SpO2: 98% (08-05-20 @ 17:15) (98% - 100%)    CBC Full  -  ( 05 Aug 2020 07:54 )  WBC Count : 5.50 K/uL  RBC Count : 3.98 M/uL  Hemoglobin : 9.6 g/dL  Hematocrit : 34.5 %  Platelet Count - Automated : 193 K/uL  Mean Cell Volume : 86.7 fl  Mean Cell Hemoglobin : 24.1 pg  Mean Cell Hemoglobin Concentration : 27.8 gm/dL    08-05    140  |  99  |  43.0<H>  ----------------------------<  206<H>  3.8   |  29.0  |  1.72<H>    Ca    8.3<L>      05 Aug 2020 07:54  Phos  1.5     08-04  Mg     1.8     08-05    TPro  6.0<L>  /  Alb  3.4  /  TBili  0.3<L>  /  DBili  x   /  AST  16  /  ALT  9   /  AlkPhos  107  08-04    CARDIAC MARKERS ( 04 Aug 2020 07:49 )  x     / 0.04 ng/mL / x     / x     / x      CARDIAC MARKERS ( 04 Aug 2020 03:42 )  x     / 0.04 ng/mL / x     / x     / x      CARDIAC MARKERS ( 04 Aug 2020 01:24 )  x     / 0.04 ng/mL / 21 U/L / x     / x      CARDIAC MARKERS ( 03 Aug 2020 20:46 )  x     / 0.04 ng/mL / x     / x     / x              MEDICATIONS  (STANDING):  aspirin  chewable 81 milliGRAM(s) Oral daily  atorvastatin 40 milliGRAM(s) Oral at bedtime  dextrose 5%. 1000 milliLiter(s) (50 mL/Hr) IV Continuous <Continuous>  dextrose 50% Injectable 12.5 Gram(s) IV Push once  dextrose 50% Injectable 25 Gram(s) IV Push once  dextrose 50% Injectable 25 Gram(s) IV Push once  furosemide   Injectable 20 milliGRAM(s) IV Push two times a day  heparin   Injectable 5000 Unit(s) SubCutaneous every 8 hours  insulin lispro (HumaLOG) corrective regimen sliding scale   SubCutaneous three times a day before meals  lidocaine 1% Injectable 10 milliLiter(s) Local Injection once  pantoprazole    Tablet 40 milliGRAM(s) Oral before breakfast    MEDICATIONS  (PRN):  ALPRAZolam 0.25 milliGRAM(s) Oral daily PRN anxiety  dextrose 40% Gel 15 Gram(s) Oral once PRN Blood Glucose LESS THAN 70 milliGRAM(s)/deciliter  glucagon  Injectable 1 milliGRAM(s) IntraMuscular once PRN Glucose LESS THAN 70 milligrams/deciliter        HPI:  87y old  Male with PMH/ HFpEF, CAD, HTN, HLD, DM type 2 on oral hypoglycemics, CKD stage III; rib fractures complicated by L-sided pleural effusion, requiring thoracentesis ~2 years ago; B/L pleural effusions, s/p L thoracentesis- 01/02/20, transudative, chronic lightheadedness, GERD, who presents with a chief complaint of MEJÍA and gait instability. Pt states that since about 2 mos ago he has been feeling short of breath, more with exertion -- walking around his apartment or with bending to put his shoes on. Pt states that today his visiting nurse became concerned about his breathing, him getting "winded" with minimal exertion and abnormal lung sounds. Pt states that he has been feeling the same since about 2 mos ago. Pt states that sometimes he feels chest burning if he eats late or he eats heavy meal, especially meat. pt states that he lost about 10 lbs since about 2 mos ago due to has been taking Abx for L heal ulcer and his appetite has been poor. Pt states that his doctor discontinued BP medications 2/2 low BP. Pt states that he has been drinking 0.5 L bottles of water-- about 2-3 every day. Pt denies any CP/dizziness/diaphoresis/F/C/cough/abdominal pain/sick contacts/other complaints.   Above HPI per cardio  read and agreed with verified with patient -[place] , he was started on Zinc per his PCP 2 weeks ago when he visited for SOB , he denied any edema or orthopnea , denied fever or cough or sick contact   States he is anxious about the cardiac cath and i explained the cath procedure , discussed that he might need thoracentesis with bilateral pleural effusions (04 Aug 2020 13:08)    now s/p [facility]      ASSESSMENT/PLAN:    R/O Coronary artery disease  -Coronary artery disease  -Recover patient for 3 hours  -Resume po diet  -Activity as per team  -Resume all medication  - Restricted use with no heavy lifting of affected arm for 48 hours.  No submerging the arm in water for 48 hours.  You may start showering today.  Call your doctor for any bleeding, swelling, loss of sensation in the hand or fingers, or fingers turning blue.  If heavy bleeding or large lumps form, hold pressure at the spot and come to the Emergency Room.

## 2020-08-05 NOTE — PROGRESS NOTE ADULT - SUBJECTIVE AND OBJECTIVE BOX
HPI:  87y old  Male with PMH/ HFpEF, CAD, HTN, HLD, DM type 2 on oral hypoglycemics, CKD stage III; rib fractures complicated by L-sided pleural effusion, requiring thoracentesis ~2 years ago; B/L pleural effusions, s/p L thoracentesis- 01/02/20, transudative, chronic lightheadedness, GERD, who presents with a chief complaint of MEJÍA and gait instability. Pt states that since about 2 mos ago he has been feeling short of breath, more with exertion -- walking around his apartment or with bending to put his shoes on. Pt states that today his visiting nurse became concerned about his breathing, him getting "winded" with minimal exertion and abnormal lung sounds. Pt states that he has been feeling the same since about 2 mos ago. Pt states that sometimes he feels chest burning if he eats late or he eats heavy meal, especially meat. pt states that he lost about 10 lbs since about 2 mos ago due to has been taking Abx for L heal ulcer and his appetite has been poor. Pt states that his doctor discontinued BP medications 2/2 low BP. Pt states that he has been drinking 0.5 L bottles of water-- about 2-3 every day. Pt denies any CP/dizziness/diaphoresis/F/C/cough/abdominal pain/sick contacts/other complaints.   Above HPI per cardio  read and agreed with verified with patient -In Addition , he was started on Zinc per his PCP 2 weeks ago when he visited for SOB , he denied any edema or orthopnea , denied fever or cough or sick contact   States he is anxious about the cardiac cath and i explained the cath procedure , discussed that he might need thoracentesis with bilateral pleural effusions (04 Aug 2020 13:08)    8/5 seen and examined. Reports that shortness of breath has improved greatly after chest tube insertion    	  Brief summary:  87yMale POD#     Overnight events:      PAST MEDICAL & SURGICAL HISTORY:  DM (diabetes mellitus)  HLD (hyperlipidemia)  HTN (hypertension)  PVCs (premature ventricular contractions)  CHF (congestive heart failure)  H/O right inguinal hernia repair        ALPRAZolam 0.25 milliGRAM(s) Oral daily PRN  aspirin  chewable 81 milliGRAM(s) Oral daily  atorvastatin 40 milliGRAM(s) Oral at bedtime  dextrose 40% Gel 15 Gram(s) Oral once PRN  dextrose 5%. 1000 milliLiter(s) IV Continuous <Continuous>  dextrose 50% Injectable 12.5 Gram(s) IV Push once  dextrose 50% Injectable 25 Gram(s) IV Push once  dextrose 50% Injectable 25 Gram(s) IV Push once  furosemide   Injectable 20 milliGRAM(s) IV Push two times a day  glucagon  Injectable 1 milliGRAM(s) IntraMuscular once PRN  heparin   Injectable 5000 Unit(s) SubCutaneous every 8 hours  insulin lispro (HumaLOG) corrective regimen sliding scale   SubCutaneous three times a day before meals  lidocaine 1% Injectable 10 milliLiter(s) Local Injection once  pantoprazole    Tablet 40 milliGRAM(s) Oral before breakfast  MEDICATIONS  (PRN):  ALPRAZolam 0.25 milliGRAM(s) Oral daily PRN anxiety  dextrose 40% Gel 15 Gram(s) Oral once PRN Blood Glucose LESS THAN 70 milliGRAM(s)/deciliter  glucagon  Injectable 1 milliGRAM(s) IntraMuscular once PRN Glucose LESS THAN 70 milligrams/deciliter      Daily     Daily                               8.9    5.42  )-----------( 173      ( 04 Aug 2020 07:49 )             31.8   08-04    142  |  104  |  41.0<H>  ----------------------------<  109<H>  3.4<L>   |  25.0  |  1.35<H>    Ca    7.9<L>      04 Aug 2020 07:49  Phos  1.5     08-04  Mg     1.8     08-04    TPro  6.0<L>  /  Alb  3.4  /  TBili  0.3<L>  /  DBili  x   /  AST  16  /  ALT  9   /  AlkPhos  107  08-04    CARDIAC MARKERS ( 04 Aug 2020 07:49 )  x     / 0.04 ng/mL / x     / x     / x      CARDIAC MARKERS ( 04 Aug 2020 03:42 )  x     / 0.04 ng/mL / x     / x     / x      CARDIAC MARKERS ( 04 Aug 2020 01:24 )  x     / 0.04 ng/mL / 21 U/L / x     / x      CARDIAC MARKERS ( 03 Aug 2020 20:46 )  x     / 0.04 ng/mL / x     / x     / x        PT/INR - ( 03 Aug 2020 20:46 )   PT: 13.6 sec;   INR: 1.18 ratio         PTT - ( 03 Aug 2020 20:46 )  PTT:29.9 sec      Objective:  T(C): 36.4 (08-05-20 @ 04:44), Max: 36.9 (08-04-20 @ 11:23)  HR: 88 (08-05-20 @ 04:44) (68 - 88)  BP: 122/74 (08-05-20 @ 04:44) (108/67 - 132/65)  RR: 18 (08-05-20 @ 04:44) (18 - 18)  SpO2: 100% (08-05-20 @ 04:44) (95% - 100%)  Wt(kg): --CAPILLARY BLOOD GLUCOSE      POCT Blood Glucose.: 182 mg/dL (05 Aug 2020 08:09)  POCT Blood Glucose.: 251 mg/dL (04 Aug 2020 22:47)  POCT Blood Glucose.: 125 mg/dL (04 Aug 2020 18:23)  POCT Blood Glucose.: 110 mg/dL (04 Aug 2020 12:02)  POCT Blood Glucose.: 77 mg/dL (04 Aug 2020 08:23)  I&O's Summary    04 Aug 2020 07:01  -  05 Aug 2020 07:00  --------------------------------------------------------  IN: 0 mL / OUT: 1730 mL / NET: -1730 mL        Physical Exam  Neuro: A+O x 3, non-focal, speech clear and intact  Pulm: CTA, equal bilaterally, RT sided pigtail catheter in place. site c/d/i, no signs of infection or hematoma.  CV: RRR, +S1S2, no m/r/g  Abd: soft, NT, ND, +BS  Ext: +DP Pulses b/l, no edema      Imaging:  CXR: < from: Xray Chest 1 View AP/PA. (08.03.20 @ 22:21) >  INTERPRETATION:  AP chest on August 3, 2020 at 9:48 PM. Patient has chest pain. Covid virus testing is pending. Patient has history of CHF, hypertension, anddiabetes. Patient has PVCs. Patient has history of thoracentesis.    Heart is magnified by technique.    Moderate loculated left base effusion again noted similar to January 15 of this year.    The present film now also shows what appears to be atelectasis of the right middle lobe associated with an irregular effusion.    IMPRESSION: Stable left chest findings.  Right chest findings.    < end of copied text >        ECG:

## 2020-08-05 NOTE — DISCHARGE NOTE NURSING/CASE MANAGEMENT/SOCIAL WORK - PATIENT PORTAL LINK FT
You can access the FollowMyHealth Patient Portal offered by [facility] by registering at the following website: [email or web address]. By joining [facility]’s FollowMyHealth portal, you will also be able to view your health information using other applications (apps) compatible with our system.

## 2020-08-05 NOTE — PROGRESS NOTE ADULT - ASSESSMENT
87y old  Male with PMH/ HFpEF, CAD, HTN, HLD, DM type 2 on oral hypoglycemics, CKD stage III; rib fractures complicated by L-sided pleural effusion, requiring thoracentesis ~2 years ago; B/L pleural effusions, s/p L thoracentesis- 01/02/20, transudative, chronic lightheadedness, GERD, who presents with a chief complaint of MEJÍA and gait instability    SOB- multifactorial - Bilateral pleural effusions , CHF , Anemia     CHF with negative troponin and elevated BNP -Ubnknn  EF - Follow ECHO   BP on the soft side - may not tolerate ACEI or BB- for now diuresis with Lasix 20 BID to continue. start ACEI when BP allows   Bilateral Pleural Effusions ,   Hypomagnesemia- supplemented     DM-2-follow HgbA1c -  FS with HISS avoid hypoglycemia with procedure planned and low food intake     Weight loss and early satiety- could be CHF related but may need GI workup    CAD/HlD- cont statin and aspirin , per Cardio- positive calcification on CT Lcx and LAD    Anemia follow ferritin and TIBC, no bleeding check Stool Occult blood      GERD- cont PPI    CKD- stable - follow renal indices- low contrast during Cath- fluids if needed     DVT PPX- heparin     Anxiety low dose xanax PRN 87y old  Male with PMH/ HFpEF, CAD, HTN, HLD, DM type 2 on oral hypoglycemics, CKD stage III; rib fractures complicated by L-sided pleural effusion requiring thoracentesis ~2 years ago; B/L pleural effusions, s/p L thoracentesis- 01/02/20, transudative, chronic lightheadedness, GERD, who presents with dyspnea and CHF.    Dyspnea  - multifactorial - Bilateral pleural effusions , CHF , Anemia   - improved s/p R chest tube. Spoke to IR, loculated L sided effusions, will defer at this time and monitor  - continue lasix  - effusion cytology pending   - thoracic surgery following, will remove drain when output minimal    CHF with negative troponin and elevated BNP   - s/p Echo, EF 45-50%  - continue Lasix BID  - holding ACEI given soft BPs  - cardiac cath today, awaiting reccs from cardiology    Hypomagnesemia- supplemented, will monitor     T2DM  - HbA1c 6.3% , goal <8.0%  - mild hyperglycemia yesterday,   - will d/c sliding scale and monitor with daily labs  - DASH diet    Weight loss and early satiety  - could be CHF related but may need GI workup. Will monitor post-cath    CAD/HLD  - cont statin and aspirin , per Cardio  - positive calcification on CT Lcx and LAD  - awaiting cath recommendations as per cardiology    Anemia -   follow ferritin and TIBC, no bleeding  - check Stool Occult blood      GERD- cont PPI    CKD  - stable   - will monitor renal function post-Cath- fluids if needed     DVT PPX- heparin     Anxiety low dose xanax PRN for cath or procedures

## 2020-08-05 NOTE — PROGRESS NOTE ADULT - SUBJECTIVE AND OBJECTIVE BOX
Patient is a 87y old  Male who presents with a chief complaint of SOB, CP,m possible ACS , CHF (05 Aug 2020 15:50)      INTERVAL HPI/OVERNIGHT EVENTS:      MEDICATIONS  (STANDING):  aspirin  chewable 81 milliGRAM(s) Oral daily  atorvastatin 40 milliGRAM(s) Oral at bedtime  dextrose 5%. 1000 milliLiter(s) (50 mL/Hr) IV Continuous <Continuous>  dextrose 50% Injectable 12.5 Gram(s) IV Push once  dextrose 50% Injectable 25 Gram(s) IV Push once  dextrose 50% Injectable 25 Gram(s) IV Push once  furosemide   Injectable 20 milliGRAM(s) IV Push two times a day  heparin   Injectable 5000 Unit(s) SubCutaneous every 8 hours  insulin lispro (HumaLOG) corrective regimen sliding scale   SubCutaneous three times a day before meals  lidocaine 1% Injectable 10 milliLiter(s) Local Injection once  pantoprazole    Tablet 40 milliGRAM(s) Oral before breakfast    MEDICATIONS  (PRN):  ALPRAZolam 0.25 milliGRAM(s) Oral daily PRN anxiety  dextrose 40% Gel 15 Gram(s) Oral once PRN Blood Glucose LESS THAN 70 milliGRAM(s)/deciliter  glucagon  Injectable 1 milliGRAM(s) IntraMuscular once PRN Glucose LESS THAN 70 milligrams/deciliter      Allergies    No Known Allergies    Intolerances        REVIEW OF SYSTEMS:  CONSTITUTIONAL: No fever, weight loss, or fatigue  EYES: No eye pain, visual disturbances, or discharge  ENMT:  No difficulty hearing, tinnitus, vertigo; No sinus or throat pain  NECK: No pain or stiffness  RESPIRATORY: No cough, wheezing, chills or hemoptysis; No shortness of breath  CARDIOVASCULAR: No chest pain, palpitations, or lightheadedness  GASTROINTESTINAL: No abdominal or epigastric pain. No nausea, vomiting, or hematemesis; No diarrhea or constipation. No melena or hematochezia.  GENITOURINARY: No dysuria, frequency, hematuria, or incontinence  NEUROLOGICAL: No headaches, vertigo, memory loss, loss of strength, numbness, or tremors  SKIN: No itching, burning, rashes, or lesions   LYMPH NODES: No enlarged glands  ENDOCRINE: No heat or cold intolerance; No hair loss; No polydipsia or polyuria  MUSCULOSKELETAL: No back pain  PSYCHIATRIC: No depression, anxiety, or mood swings  HEME/LYMPH: No easy bruising, or bleeding gums  ALLERGY AND IMMUNOLOGIC: No hives or eczema    PHYSICAL EXAM:  GENERAL: NAD, well-groomed, well-developed  HEAD:  Atraumatic, Normocephalic  EYES: EOMI, PERRLA, conjunctiva and sclera clear  ENMT: Moist mucous membranes, Good dentition, No lesions; No tonsillar erythema, exudates, or enlargement   NECK: Supple, No JVD appreciated, Normal thyroid  NERVOUS SYSTEM:  Alert & Oriented X3, Good concentration; Bilateral LE mobile, sensation to light touch intact  CHEST/LUNG: Clear to auscultation bilaterally; No rales, rhonchi, wheezing, or rubs  HEART: Regular rate and rhythm; No murmurs, rubs, or gallops  ABDOMEN: Soft, Nontender, Nondistended; Bowel sounds present  EXTREMITIES:  2+ Peripheral Pulses, No clubbing or cyanosis  LYMPH: No lymphadenopathy noted  SKIN: No rashes or lesions  INCISION:  Dressing dry and intact    Vital Signs Last 24 Hrs  T(C): 36.6 (05 Aug 2020 16:10), Max: 36.6 (05 Aug 2020 16:10)  T(F): 97.8 (05 Aug 2020 16:10), Max: 97.8 (05 Aug 2020 16:10)  HR: 84 (05 Aug 2020 17:15) (77 - 94)  BP: 113/64 (05 Aug 2020 17:15) (113/64 - 143/62)  BP(mean): --  RR: 18 (05 Aug 2020 17:15) (17 - 18)  SpO2: 98% (05 Aug 2020 17:15) (98% - 100%)    LABS:                        9.6    5.50  )-----------( 193      ( 05 Aug 2020 07:54 )             34.5     05 Aug 2020 07:54    140    |  99     |  43.0   ----------------------------<  206    3.8     |  29.0   |  1.72     Ca    8.3        05 Aug 2020 07:54  Mg     1.8       05 Aug 2020 07:54      PT/INR - ( 03 Aug 2020 20:46 )   PT: 13.6 sec;   INR: 1.18 ratio         PTT - ( 03 Aug 2020 20:46 )  PTT:29.9 sec    CAPILLARY BLOOD GLUCOSE      POCT Blood Glucose.: 171 mg/dL (05 Aug 2020 12:04)  POCT Blood Glucose.: 182 mg/dL (05 Aug 2020 08:09)  POCT Blood Glucose.: 251 mg/dL (04 Aug 2020 22:47)  POCT Blood Glucose.: 125 mg/dL (04 Aug 2020 18:23)           [x] Consultant(s) Notes Reviewed Patient is a 87y old  Male who presents with a chief complaint of SOB, CP,m possible ACS , CHF (05 Aug 2020 15:50)      INTERVAL HPI/OVERNIGHT EVENTS:  Patient seen awake in bed. Reports breathing okay, but reports discomfort at chest tube site. Reports frequent urination which he attributes to the lasix. Reports eating well, no other complaints at this time. Patient evaluated by IR today, who agreed to defer L thoracocentesis.    MEDICATIONS  (STANDING):  aspirin  chewable 81 milliGRAM(s) Oral daily  atorvastatin 40 milliGRAM(s) Oral at bedtime  dextrose 5%. 1000 milliLiter(s) (50 mL/Hr) IV Continuous <Continuous>  dextrose 50% Injectable 12.5 Gram(s) IV Push once  dextrose 50% Injectable 25 Gram(s) IV Push once  dextrose 50% Injectable 25 Gram(s) IV Push once  furosemide   Injectable 20 milliGRAM(s) IV Push two times a day  heparin   Injectable 5000 Unit(s) SubCutaneous every 8 hours  insulin lispro (HumaLOG) corrective regimen sliding scale   SubCutaneous three times a day before meals  lidocaine 1% Injectable 10 milliLiter(s) Local Injection once  pantoprazole    Tablet 40 milliGRAM(s) Oral before breakfast    MEDICATIONS  (PRN):  ALPRAZolam 0.25 milliGRAM(s) Oral daily PRN anxiety  dextrose 40% Gel 15 Gram(s) Oral once PRN Blood Glucose LESS THAN 70 milliGRAM(s)/deciliter  glucagon  Injectable 1 milliGRAM(s) IntraMuscular once PRN Glucose LESS THAN 70 milligrams/deciliter      Allergies    No Known Allergies    Intolerances        REVIEW OF SYSTEMS:  CONSTITUTIONAL: No fever, weight loss, or fatigue  EYES: No eye pain, visual disturbances, or discharge  ENMT:  No difficulty hearing, tinnitus, vertigo; No sinus or throat pain  NECK: No pain or stiffness  RESPIRATORY: Pain at R chest tube site. No cough, wheezing, chills or hemoptysis; No shortness of breath  CARDIOVASCULAR: No chest pain, palpitations, or lightheadedness  GASTROINTESTINAL: No abdominal or epigastric pain. No nausea, vomiting, or hematemesis; No diarrhea or constipation. No melena or hematochezia.  GENITOURINARY: No dysuria, frequency, hematuria, or incontinence  NEUROLOGICAL: No headaches, vertigo, memory loss, loss of strength, numbness, or tremors  SKIN: No itching, burning, rashes, or lesions   LYMPH NODES: No enlarged glands  ENDOCRINE: No heat or cold intolerance; No hair loss; No polydipsia or polyuria  MUSCULOSKELETAL: No back pain  PSYCHIATRIC: No depression, anxiety, or mood swings  HEME/LYMPH: No easy bruising, or bleeding gums  ALLERGY AND IMMUNOLOGIC: No hives or eczema    PHYSICAL EXAM:  GENERAL: NAD, well-groomed, well-developed  HEAD:  Atraumatic, Normocephalic  EYES: EOMI, PERRLA, conjunctiva and sclera clear  ENMT: Moist mucous membranes, Good dentition  NECK: Supple, No JVD appreciated, Normal thyroid  NERVOUS SYSTEM:  Alert & Oriented X3, Good concentration; Bilateral LE mobile, sensation to light touch intact  CHEST/LUNG: Diminished breath sounds at LLL; No rales, rhonchi, wheezing, or rubs appreciated  HEART: Regular rate and rhythm; No murmurs, rubs, or gallops  ABDOMEN: Soft, Nontender, Nondistended  EXTREMITIES:  2+ Peripheral Pulses, No clubbing or cyanosis  LYMPH: No lymphadenopathy noted  SKIN: No rashes or lesions  INCISION:  Dressing on R posterior thorax dry and intact, no bleeding    Vital Signs Last 24 Hrs  T(C): 36.6 (05 Aug 2020 16:10), Max: 36.6 (05 Aug 2020 16:10)  T(F): 97.8 (05 Aug 2020 16:10), Max: 97.8 (05 Aug 2020 16:10)  HR: 84 (05 Aug 2020 17:15) (77 - 94)  BP: 113/64 (05 Aug 2020 17:15) (113/64 - 143/62)  BP(mean): --  RR: 18 (05 Aug 2020 17:15) (17 - 18)  SpO2: 98% (05 Aug 2020 17:15) (98% - 100%)    LABS:                        9.6    5.50  )-----------( 193      ( 05 Aug 2020 07:54 )             34.5     05 Aug 2020 07:54    140    |  99     |  43.0   ----------------------------<  206    3.8     |  29.0   |  1.72     Ca    8.3        05 Aug 2020 07:54  Mg     1.8       05 Aug 2020 07:54      PT/INR - ( 03 Aug 2020 20:46 )   PT: 13.6 sec;   INR: 1.18 ratio         PTT - ( 03 Aug 2020 20:46 )  PTT:29.9 sec    CAPILLARY BLOOD GLUCOSE      POCT Blood Glucose.: 171 mg/dL (05 Aug 2020 12:04)  POCT Blood Glucose.: 182 mg/dL (05 Aug 2020 08:09)  POCT Blood Glucose.: 251 mg/dL (04 Aug 2020 22:47)  POCT Blood Glucose.: 125 mg/dL (04 Aug 2020 18:23)           [x] Consultant(s) Notes Reviewed

## 2020-08-05 NOTE — PROGRESS NOTE ADULT - SUBJECTIVE AND OBJECTIVE BOX
87y old  Male with PMH/ HFpEF, CAD, HTN, HLD, DM type 2 on oral hypoglycemics, CKD stage III; rib fractures complicated by L-sided pleural effusion, requiring thoracentesis ~2 years ago; B/L pleural effusions, s/p L thoracentesis- 01/02/20, transudative, chronic lightheadedness, GERD, who presents with a chief complaint of MEJÍA and gait instability    < from: TTE Echo Complete w/o Contrast w/ Doppler (08.04.20 @ 08:49) >  Summary:   1. Left ventricular ejection fraction, by visual estimation, is 45 to 50%.   2. Mildly decreased global left ventricular systolic function.   3. Mildly increased LV wall thickness.   4. Normal left ventricular internal cavity size.   5. Spectral Doppler shows pseudonormal pattern of left ventricular myocardial filling (Grade II diastolic dysfunction).   6. Large pleural effusion in both left and right lateral regions.   7. There is no evidence of pericardial effusion.   8. Mild mitral valve regurgitation.   9. Thickening and calcification of the anterior and posterior mitral valve leaflets.  10. Moderate tricuspid regurgitation.  11. Estimated pulmonary artery systolic pressure is 55.3 mmHg assuming a right atrial pressure of 8 mmHg, which is consistent with moderate pulmonary hypertension.  12. Mitral valve mean gradient is 4.0 mmHg consistent with mild mitral stenosis.  13. The aortic valve mean gradient is 3.0 mmHg consistent with normally opening aortic valve.    MD Steffi Electronically signed on 8/4/2020 at 10:55:05 AM      < end of copied text >      Assessment and Plan  Cachectic elderly white male admitted with Rib fxs and c/o dyspnea and c/p noted to have a reduced EF on Echo 45-50%. BNP 06884.    ASA 3-4  Mallampati II  GFR 35  Creat 1.72  Bleeding Risk 5.2%  COVID19 - Negative 8/5/2020    Plan cardiac cath to assess for ischemic HD  NS 60 cc bolus for GFR 35 87y old  Male with PMH/ HFpEF, CAD, HTN, HLD, DM type 2 on oral hypoglycemics, CKD stage III; rib fractures complicated by L-sided pleural effusion, requiring thoracentesis ~2 years ago; B/L pleural effusions, s/p L thoracentesis- 01/02/20, transudative, chronic lightheadedness, GERD, who presents with a chief complaint of MEJÍA and gait instability    < from: TTE Echo Complete w/o Contrast w/ Doppler (08.04.20 @ 08:49) >  Summary:   1. Left ventricular ejection fraction, by visual estimation, is 45 to 50%.   2. Mildly decreased global left ventricular systolic function.   3. Mildly increased LV wall thickness.   4. Normal left ventricular internal cavity size.   5. Spectral Doppler shows pseudonormal pattern of left ventricular myocardial filling (Grade II diastolic dysfunction).   6. Large pleural effusion in both left and right lateral regions.   7. There is no evidence of pericardial effusion.   8. Mild mitral valve regurgitation.   9. Thickening and calcification of the anterior and posterior mitral valve leaflets.  10. Moderate tricuspid regurgitation.  11. Estimated pulmonary artery systolic pressure is 55.3 mmHg assuming a right atrial pressure of 8 mmHg, which is consistent with moderate pulmonary hypertension.  12. Mitral valve mean gradient is 4.0 mmHg consistent with mild mitral stenosis.  13. The aortic valve mean gradient is 3.0 mmHg consistent with normally opening aortic valve.    MD Steffi Electronically signed on 8/4/2020 at 10:55:05 AM      < end of copied text >    A&O x3  Chest tube to pleuravac  Telemetry  reveals sinus arrhythmia with frequent PVC's       Assessment and Plan  Cachectic elderly white male admitted with Rib fxs and c/o dyspnea and c/p noted to have a reduced EF on Echo 45-50%. BNP 49841.    ASA 3-4  Mallampati II  GFR 35  Creat 1.72  Bleeding Risk 5.2%  COVID19 - Negative 8/5/2020    Plan cardiac cath to assess for ischemic HD  NS 60 cc bolus for GFR 35

## 2020-08-06 LAB
ANION GAP SERPL CALC-SCNC: 14 MMOL/L — SIGNIFICANT CHANGE UP (ref 5–17)
APTT BLD: 123.9 SEC — CRITICAL HIGH (ref 27.5–35.5)
APTT BLD: 30.8 SEC — SIGNIFICANT CHANGE UP (ref 27.5–35.5)
BUN SERPL-MCNC: 35 MG/DL — HIGH (ref 8–20)
CALCIUM SERPL-MCNC: 8.4 MG/DL — LOW (ref 8.6–10.2)
CHLORIDE SERPL-SCNC: 99 MMOL/L — SIGNIFICANT CHANGE UP (ref 98–107)
CO2 SERPL-SCNC: 29 MMOL/L — SIGNIFICANT CHANGE UP (ref 22–29)
CREAT SERPL-MCNC: 1.37 MG/DL — HIGH (ref 0.5–1.3)
GLUCOSE BLDC GLUCOMTR-MCNC: 147 MG/DL — HIGH (ref 70–99)
GLUCOSE BLDC GLUCOMTR-MCNC: 162 MG/DL — HIGH (ref 70–99)
GLUCOSE BLDC GLUCOMTR-MCNC: 244 MG/DL — HIGH (ref 70–99)
GLUCOSE SERPL-MCNC: 126 MG/DL — HIGH (ref 70–99)
HCT VFR BLD CALC: 37.6 % — LOW (ref 39–50)
HCT VFR BLD CALC: 37.8 % — LOW (ref 39–50)
HGB BLD-MCNC: 10.5 G/DL — LOW (ref 13–17)
HGB BLD-MCNC: 10.5 G/DL — LOW (ref 13–17)
MAGNESIUM SERPL-MCNC: 1.7 MG/DL — SIGNIFICANT CHANGE UP (ref 1.6–2.6)
MCHC RBC-ENTMCNC: 24.2 PG — LOW (ref 27–34)
MCHC RBC-ENTMCNC: 24.3 PG — LOW (ref 27–34)
MCHC RBC-ENTMCNC: 27.8 GM/DL — LOW (ref 32–36)
MCHC RBC-ENTMCNC: 27.9 GM/DL — LOW (ref 32–36)
MCV RBC AUTO: 86.6 FL — SIGNIFICANT CHANGE UP (ref 80–100)
MCV RBC AUTO: 87.5 FL — SIGNIFICANT CHANGE UP (ref 80–100)
NON-GYNECOLOGICAL CYTOLOGY STUDY: SIGNIFICANT CHANGE UP
PLATELET # BLD AUTO: 185 K/UL — SIGNIFICANT CHANGE UP (ref 150–400)
PLATELET # BLD AUTO: 204 K/UL — SIGNIFICANT CHANGE UP (ref 150–400)
POTASSIUM SERPL-MCNC: 4.4 MMOL/L — SIGNIFICANT CHANGE UP (ref 3.5–5.3)
POTASSIUM SERPL-SCNC: 4.4 MMOL/L — SIGNIFICANT CHANGE UP (ref 3.5–5.3)
RBC # BLD: 4.32 M/UL — SIGNIFICANT CHANGE UP (ref 4.2–5.8)
RBC # BLD: 4.34 M/UL — SIGNIFICANT CHANGE UP (ref 4.2–5.8)
RBC # FLD: 23.9 % — HIGH (ref 10.3–14.5)
RBC # FLD: 23.9 % — HIGH (ref 10.3–14.5)
SODIUM SERPL-SCNC: 142 MMOL/L — SIGNIFICANT CHANGE UP (ref 135–145)
WBC # BLD: 8.39 K/UL — SIGNIFICANT CHANGE UP (ref 3.8–10.5)
WBC # BLD: 8.58 K/UL — SIGNIFICANT CHANGE UP (ref 3.8–10.5)
WBC # FLD AUTO: 8.39 K/UL — SIGNIFICANT CHANGE UP (ref 3.8–10.5)
WBC # FLD AUTO: 8.58 K/UL — SIGNIFICANT CHANGE UP (ref 3.8–10.5)

## 2020-08-06 PROCEDURE — 99233 SBSQ HOSP IP/OBS HIGH 50: CPT

## 2020-08-06 PROCEDURE — 71045 X-RAY EXAM CHEST 1 VIEW: CPT | Mod: 26

## 2020-08-06 PROCEDURE — 99232 SBSQ HOSP IP/OBS MODERATE 35: CPT

## 2020-08-06 PROCEDURE — 99231 SBSQ HOSP IP/OBS SF/LOW 25: CPT

## 2020-08-06 RX ORDER — COLLAGENASE CLOSTRIDIUM HIST. 250 UNIT/G
1 OINTMENT (GRAM) TOPICAL DAILY
Refills: 0 | Status: DISCONTINUED | OUTPATIENT
Start: 2020-08-06 | End: 2020-08-12

## 2020-08-06 RX ORDER — HEPARIN SODIUM 5000 [USP'U]/ML
4500 INJECTION INTRAVENOUS; SUBCUTANEOUS EVERY 6 HOURS
Refills: 0 | Status: DISCONTINUED | OUTPATIENT
Start: 2020-08-06 | End: 2020-08-07

## 2020-08-06 RX ORDER — METOPROLOL TARTRATE 50 MG
25 TABLET ORAL DAILY
Refills: 0 | Status: DISCONTINUED | OUTPATIENT
Start: 2020-08-06 | End: 2020-08-12

## 2020-08-06 RX ORDER — MAGNESIUM SULFATE 500 MG/ML
1 VIAL (ML) INJECTION ONCE
Refills: 0 | Status: COMPLETED | OUTPATIENT
Start: 2020-08-06 | End: 2020-08-06

## 2020-08-06 RX ORDER — ACETAMINOPHEN 500 MG
650 TABLET ORAL EVERY 6 HOURS
Refills: 0 | Status: DISCONTINUED | OUTPATIENT
Start: 2020-08-06 | End: 2020-08-12

## 2020-08-06 RX ORDER — POVIDONE-IODINE 5 %
1 AEROSOL (ML) TOPICAL
Refills: 0 | Status: DISCONTINUED | OUTPATIENT
Start: 2020-08-06 | End: 2020-08-12

## 2020-08-06 RX ORDER — OXYCODONE AND ACETAMINOPHEN 5; 325 MG/1; MG/1
1 TABLET ORAL ONCE
Refills: 0 | Status: DISCONTINUED | OUTPATIENT
Start: 2020-08-06 | End: 2020-08-06

## 2020-08-06 RX ORDER — HEPARIN SODIUM 5000 [USP'U]/ML
2000 INJECTION INTRAVENOUS; SUBCUTANEOUS EVERY 6 HOURS
Refills: 0 | Status: DISCONTINUED | OUTPATIENT
Start: 2020-08-06 | End: 2020-08-07

## 2020-08-06 RX ORDER — HEPARIN SODIUM 5000 [USP'U]/ML
INJECTION INTRAVENOUS; SUBCUTANEOUS
Qty: 25000 | Refills: 0 | Status: DISCONTINUED | OUTPATIENT
Start: 2020-08-06 | End: 2020-08-07

## 2020-08-06 RX ORDER — ATORVASTATIN CALCIUM 80 MG/1
20 TABLET, FILM COATED ORAL AT BEDTIME
Refills: 0 | Status: DISCONTINUED | OUTPATIENT
Start: 2020-08-06 | End: 2020-08-12

## 2020-08-06 RX ORDER — SODIUM HYPOCHLORITE 0.125 %
1 SOLUTION, NON-ORAL MISCELLANEOUS DAILY
Refills: 0 | Status: DISCONTINUED | OUTPATIENT
Start: 2020-08-06 | End: 2020-08-12

## 2020-08-06 RX ADMIN — OXYCODONE AND ACETAMINOPHEN 1 TABLET(S): 5; 325 TABLET ORAL at 08:56

## 2020-08-06 RX ADMIN — ATORVASTATIN CALCIUM 20 MILLIGRAM(S): 80 TABLET, FILM COATED ORAL at 21:09

## 2020-08-06 RX ADMIN — HEPARIN SODIUM 1000 UNIT(S)/HR: 5000 INJECTION INTRAVENOUS; SUBCUTANEOUS at 18:25

## 2020-08-06 RX ADMIN — HEPARIN SODIUM 5000 UNIT(S): 5000 INJECTION INTRAVENOUS; SUBCUTANEOUS at 05:14

## 2020-08-06 RX ADMIN — PANTOPRAZOLE SODIUM 40 MILLIGRAM(S): 20 TABLET, DELAYED RELEASE ORAL at 05:14

## 2020-08-06 RX ADMIN — Medication 81 MILLIGRAM(S): at 08:56

## 2020-08-06 RX ADMIN — Medication 100 GRAM(S): at 11:17

## 2020-08-06 RX ADMIN — Medication 0.25 MILLIGRAM(S): at 02:51

## 2020-08-06 RX ADMIN — Medication 20 MILLIGRAM(S): at 05:13

## 2020-08-06 RX ADMIN — OXYCODONE AND ACETAMINOPHEN 1 TABLET(S): 5; 325 TABLET ORAL at 09:45

## 2020-08-06 RX ADMIN — Medication 1 APPLICATION(S): at 18:27

## 2020-08-06 RX ADMIN — Medication 25 MILLIGRAM(S): at 08:56

## 2020-08-06 RX ADMIN — HEPARIN SODIUM 1100 UNIT(S)/HR: 5000 INJECTION INTRAVENOUS; SUBCUTANEOUS at 11:22

## 2020-08-06 NOTE — PROGRESS NOTE ADULT - ASSESSMENT
87y old  Male with PMH/ HFpEF, CAD, HTN, HLD, DM type 2 on oral hypoglycemics, CKD stage III; rib fractures complicated by L-sided pleural effusion, requiring thoracentesis ~2 years ago; B/L pleural effusions, s/p L thoracentesis- 01/02/20, transudative, chronic lightheadedness, GERD, who presents with a chief complaint of MEJÍA and gait instability. Pt states that since about 2 mos ago he has been feeling short of breath, more with exertion -- walking around his apartment or with bending to put his shoes on. Pt states that today his visiting nurse became concerned about his breathing, him getting "winded" with minimal exertion and abnormal lung sounds. Pt states that he has been feeling the same since about 2 mos ago. Pt states that sometimes he feels chest burning if he eats late or he eats heavy meal, especially meat. pt states that he lost about 10 lbs since about 2 mos ago due to has been taking Abx for L heal ulcer and his appetite has been poor. Pt states that his doctor discontinued BP medications 2/2 low BP. Pt states that he has been drinking 0.5 L bottles of water-- about 2-3 every day. Pt denies any CP/dizziness/diaphoresis/F/C/cough/abdominal pain/sick contacts/other complaints. Now s/p LHC which showed non obstructive disease and s/p right pigtail which is draining serosanguinous fluid.     S/P Coronary Angiogram   - non obstructive CAD  - no chest pain   - continue ASA, toprol, lipitor  - Diet/lifestyle modifications and medication compliance heavily reinforced   - Patient educated about wrist site care, signs and symptoms of complication post procedure, and plan of care moving forward. Patient verbalized understanding with teach-back.   - patient not a candidate for cardiac rehab secondary to: no intervention     HFpEF/Pleural Effusions  - right pleural effusion s/p pigtail placement  - 1200 output in 24 hours, 150 output overnight  - diminished lungs  - continue toprol  - switch lasix to PO torsemide   - strict I/O  - daily weights  - Thoracic following   - ctyopathology pending   - 800mL fluid restriction 87y old  Male with PMH/ HFpEF, CAD, HTN, HLD, DM type 2 on oral hypoglycemics, CKD stage III; rib fractures complicated by L-sided pleural effusion, requiring thoracentesis ~2 years ago; B/L pleural effusions, s/p L thoracentesis- 01/02/20, transudative, chronic lightheadedness, GERD, who presents with a chief complaint of MEJÍA and gait instability. Pt states that since about 2 mos ago he has been feeling short of breath, more with exertion -- walking around his apartment or with bending to put his shoes on. Pt states that today his visiting nurse became concerned about his breathing, him getting "winded" with minimal exertion and abnormal lung sounds. Pt states that he has been feeling the same since about 2 mos ago. Pt states that sometimes he feels chest burning if he eats late or he eats heavy meal, especially meat. pt states that he lost about 10 lbs since about 2 mos ago due to has been taking Abx for L heal ulcer and his appetite has been poor. Pt states that his doctor discontinued BP medications 2/2 low BP. Pt states that he has been drinking 0.5 L bottles of water-- about 2-3 every day. Pt denies any CP/dizziness/diaphoresis/F/C/cough/abdominal pain/sick contacts/other complaints. Now s/p LHC which showed non obstructive disease and s/p right pigtail which is draining serosanguinous fluid.     S/P Coronary Angiogram   - non obstructive CAD  - no chest pain   - continue ASA, toprol, lipitor  - Diet/lifestyle modifications and medication compliance heavily reinforced   - Patient educated about wrist site care, signs and symptoms of complication post procedure, and plan of care moving forward. Patient verbalized understanding with teach-back.   - patient not a candidate for cardiac rehab secondary to: no intervention     HFpEF/Pleural Effusions  - right pleural effusion s/p pigtail placement  - 1200 output in 24 hours, 150 output overnight  - diminished lungs  - continue toprol  - switch lasix to PO torsemide   - strict I/O  - daily weights  - Thoracic following   - ctyopathology pending   - 800mL fluid restriction     Atrial Fibrillation   - New onset afib  - CHADSVASC 5   - rates controlled  - continue telemetry   - start toprol xL 25mg daily   - Start heparin infusion for AC  - eventual plan for [facility]

## 2020-08-06 NOTE — PROGRESS NOTE ADULT - SUBJECTIVE AND OBJECTIVE BOX
[place] [facility]                          [address] [address]                       Phone: [phone]. Fax:[phone]                      ________________________________________________           Reason for follow up/Overnight events:     HPI:  87y old  Male with PMH/ HFpEF, CAD, HTN, HLD, DM type 2 on oral hypoglycemics, CKD stage III; rib fractures complicated by L-sided pleural effusion, requiring thoracentesis ~2 years ago; B/L pleural effusions, s/p L thoracentesis- 20, transudative, chronic lightheadedness, GERD, who presents with a chief complaint of MEJÍA and gait instability. Pt states that since about 2 mos ago he has been feeling short of breath, more with exertion -- walking around his apartment or with bending to put his shoes on. Pt states that today his visiting nurse became concerned about his breathing, him getting "winded" with minimal exertion and abnormal lung sounds. Pt states that he has been feeling the same since about 2 mos ago. Pt states that sometimes he feels chest burning if he eats late or he eats heavy meal, especially meat. pt states that he lost about 10 lbs since about 2 mos ago due to has been taking Abx for L heal ulcer and his appetite has been poor. Pt states that his doctor discontinued BP medications 2/2 low BP. Pt states that he has been drinking 0.5 L bottles of water-- about 2-3 every day. Pt denies any CP/dizziness/diaphoresis/F/C/cough/abdominal pain/sick contacts/other complaints.   Above HPI per cardio  read and agreed with verified with patient -In Addition , he was started on Zinc per his PCP 2 weeks ago when he visited for SOB , he denied any edema or orthopnea , denied fever or cough or sick contact   States he is anxious about the cardiac cath and i explained the cath procedure , discussed that he might need thoracentesis with bilateral pleural effusions (04 Aug 2020 13:08)      ROS: All review of systems negative unless indicated otherwise below.                          LAB RESULTS                   COMPLETE BLOOD COUNT( 06 Aug 2020 06:59 )                            10.5 g/dL<L>  8.39 K/uL )---------------( 204 K/uL                        37.6 %<L>      Automated Differential     Auto Basophil # - X      Auto Basophil % - X      Auto Eosinophil # - X      Auto Eosinophil % - X      Auto Immature Granulocyte # - X      Auto Immature Granulocyte % - X      Auto Lymphocyte # - X      Auto Lymphocyte % - X      Auto Monocyte # - X      Auto Monocyte % - X      Auto Neutrophil # - X      Auto Neutrophil % - X                                      CHEMISTRY                 Basic Metabolic Panel (20 @ 06:59)    142  |  99  |  35.0<H>  ----------------------------<  126<H>  4.4   |  29.0  |  1.37<H>    Ca    8.4<L>      06 Aug 2020 06:59  Phos  1.5     08-  Mg     1.7     08-06                    Liver Functions (20 @ 07:49))  TPro  6.0  /  Alb  3.4  /  TBili  0.3  /  DBili  x   /  AST  16  /  ALT  9   /  AlkPhos  107     PT/INR/PTT ( 03 Aug 2020 20:46 )                        :                       :      13.6         :       29.9                  .        .                   .              .           .       1.18        .                                                                   Cardiac Enzymes   ( 04 Aug 2020 07:49 )  Troponin T  0.04 ,  CPK  X    , CKMB  X    , BNP X        , ( 04 Aug 2020 03:42 )  Troponin T  0.04 ,  CPK  X    , CKMB  X    , BNP X        , ( 04 Aug 2020 01:24 )  Troponin T  0.04 ,  CPK  21<L>, CKMB  X    , BNP X                                  MICROBIOLOGY/CULTURES:                                RADIOLOGY RESULTS: Personally visualized                           CARDIOLOGY RESULTS: Official Report/Preliminary Verbal Reports    ECHO:   STRESS:   CATH:                         CARDIOLOGY REVIEW: Personally visualized and reviewed    EKG:   Telemetry Last 24h:                              DAILY WEIGHTS - 48 HOUR TREND     Daily Weight in k.9 (06 Aug 2020 05:02)                             INTAKE AND OUTPUT - 48 HOUR TREND     20 @ 07:01  -  20 @ 07:00  --------------------------------------------------------  IN:  Total IN: 0 mL    OUT:    Chest Tube: 1105 mL    Voided: 625 mL  Total OUT: 1730 mL    Total NET: -1730 mL      20 @ 07:01  -  20 @ 07:00  --------------------------------------------------------  IN:  Total IN: 0 mL    OUT:    Bulb: 40 mL    Chest Tube: 150 mL  Total OUT: 190 mL    Total NET: -190 mL          HOME MEDICATIONS:  atorvastatin 10 mg oral tablet: 1 tab(s) orally once a day (05 Aug 2020 14:50)  Combigan 0.2%-0.5% ophthalmic solution: 1 drop(s) to each affected eye every 12 hours (05 Aug 2020 14:50)  glipiZIDE 10 mg oral tablet: 1 tab(s) orally 2 times a day (05 Aug 2020 14:50)  Januvia 50 mg oral tablet: 1 tab(s) orally once a day (05 Aug 2020 14:50)  Metoprolol Succinate ER 25 mg oral tablet, extended release: 1 tab(s) orally once a day (05 Aug 2020 14:50)  pantoprazole 20 mg oral delayed release tablet: 1 tab(s) orally once a day (05 Aug 2020 14:50)  Santyl 250 units/g topical ointment: Apply topically to affected area once a day (05 Aug 2020 14:50)  torsemide 10 mg oral tablet: 1 tab(s) orally once a day (05 Aug 2020 14:50)  Veltassa 8.4 g oral powder for reconstitution:  (05 Aug 2020 14:50)  Xalatan 0.005% ophthalmic solution: 1 drop(s) to each affected eye once a day (in the evening) (05 Aug 2020 14:50)                             Current Admission Active Medications    acetaminophen   Tablet .. 650 milliGRAM(s) Oral every 6 hours PRN Temp greater or equal to 38C (100.4F), Mild Pain (1 - 3), Moderate Pain (4 - 6)  ALPRAZolam 0.25 milliGRAM(s) Oral daily PRN anxiety  aspirin  chewable 81 milliGRAM(s) Oral daily  atorvastatin 20 milliGRAM(s) Oral at bedtime  heparin   Injectable 5000 Unit(s) SubCutaneous every 8 hours  lidocaine 1% Injectable 10 milliLiter(s) Local Injection once  metoprolol succinate ER 25 milliGRAM(s) Oral daily  pantoprazole    Tablet 40 milliGRAM(s) Oral before breakfast  torsemide 10 milliGRAM(s) Oral daily                        PHYSICAL EXAM:    Vital Signs Last 24 Hrs  T(C): 37.6 (06 Aug 2020 08:23), Max: 37.6 (06 Aug 2020 08:23)  T(F): 99.6 (06 Aug 2020 08:23), Max: 99.6 (06 Aug 2020 08:23)  HR: 103 (06 Aug 2020 08:23) (77 - 103)  BP: 101/56 (06 Aug 2020 08:23) (93/42 - 143/62)  BP(mean): --  RR: 16 (06 Aug 2020 08:23) (16 - 18)  SpO2: 93% (06 Aug 2020 08:23) (93% - 100%)    GENERAL: NAD  NECK: Supple, No JVD  NERVOUS SYSTEM:  Alert & Oriented X3, non focal neuro exam.   CHEST/LUNG: diminished lungs , No rales, rhonchi, wheezing, or rubs  HEART: Regular rate and rhythm; s1 and s2 auscultated, No murmurs, rubs, or gallops  ABDOMEN: Soft, Nontender, Nondistended; Bowel sounds present and normoactive.   EXTREMITIES:  2+ Peripheral Pulses, No clubbing, cyanosis, or edema  CATH SITE: Right wrist benign s/p cath.  No bleeding, no ecchymosis, no hematoma. Extremity Warm to touch, with palpable distal pulses, and brisk capillary refill. [place] [facility]                          [address] [address]                       Phone: [phone]. Fax:[phone]                      ________________________________________________           Reason for follow up/Overnight events: follow up post cath    HPI:  87y old  Male with PMH/ HFpEF, CAD, HTN, HLD, DM type 2 on oral hypoglycemics, CKD stage III; rib fractures complicated by L-sided pleural effusion, requiring thoracentesis ~2 years ago; B/L pleural effusions, s/p L thoracentesis- 20, transudative, chronic lightheadedness, GERD, who presents with a chief complaint of MEJÍA and gait instability. Pt states that since about 2 mos ago he has been feeling short of breath, more with exertion -- walking around his apartment or with bending to put his shoes on. Pt states that today his visiting nurse became concerned about his breathing, him getting "winded" with minimal exertion and abnormal lung sounds. Pt states that he has been feeling the same since about 2 mos ago. Pt states that sometimes he feels chest burning if he eats late or he eats heavy meal, especially meat. pt states that he lost about 10 lbs since about 2 mos ago due to has been taking Abx for L heal ulcer and his appetite has been poor. Pt states that his doctor discontinued BP medications 2/2 low BP. Pt states that he has been drinking 0.5 L bottles of water-- about 2-3 every day. Pt denies any CP/dizziness/diaphoresis/F/C/cough/abdominal pain/sick contacts/other complaints.   Above HPI per cardio  read and agreed with verified with patient -In [place] , he was started on Zinc per his PCP 2 weeks ago when he visited for SOB , he denied any edema or orthopnea , denied fever or cough or sick contact   States he is anxious about the cardiac cath and i explained the cath procedure , discussed that he might need thoracentesis with bilateral pleural effusions (04 Aug 2020 13:08)      ROS: All review of systems negative unless indicated otherwise below.                          LAB RESULTS                   COMPLETE BLOOD COUNT( 06 Aug 2020 06:59 )                            10.5 g/dL<L>  8.39 K/uL )---------------( 204 K/uL                        37.6 %<L>      Automated Differential     Auto Basophil # - X      Auto Basophil % - X      Auto Eosinophil # - X      Auto Eosinophil % - X      Auto Immature Granulocyte # - X      Auto Immature Granulocyte % - X      Auto Lymphocyte # - X      Auto Lymphocyte % - X      Auto Monocyte # - X      Auto Monocyte % - X      Auto Neutrophil # - X      Auto Neutrophil % - X                                      CHEMISTRY                 Basic Metabolic Panel (20 @ 06:59)    142  |  99  |  35.0<H>  ----------------------------<  126<H>  4.4   |  29.0  |  1.37<H>    Ca    8.4<L>      06 Aug 2020 06:59  Phos  1.5     08-  Mg     1.7     08-06                    Liver Functions (20 @ 07:49))  TPro  6.0  /  Alb  3.4  /  TBili  0.3  /  DBili  x   /  AST  16  /  ALT  9   /  AlkPhos  107     PT/INR/PTT ( 03 Aug 2020 20:46 )                        :                       :      13.6         :       29.9                  .        .                   .              .           .       1.18        .                                                                   Cardiac Enzymes   ( 04 Aug 2020 07:49 )  Troponin T  0.04 ,  CPK  X    , CKMB  X    , BNP X        , ( 04 Aug 2020 03:42 )  Troponin T  0.04 ,  CPK  X    , CKMB  X    , BNP X        , ( 04 Aug 2020 01:24 )  Troponin T  0.04 ,  CPK  21<L>, CKMB  X    , BNP X                                CARDIOLOGY RESULTS: Official Report/Preliminary Verbal Reports    ECHO:   < from: TTE Echo Complete w/o Contrast w/ Doppler (20 @ 08:49) >  Summary:   1. Left ventricular ejection fraction, by visual estimation, is 45 to 50%.   2. Mildly decreased global left ventricular systolic function.   3. Mildly increased LV wall thickness.   4. Normal left ventricular internal cavity size.   5. Spectral Doppler shows pseudonormal pattern of left ventricular myocardial filling (Grade II diastolic dysfunction).   6. Large pleural effusion in both left and right lateral regions.   7. There is no evidence of pericardial effusion.   8. Mild mitral valve regurgitation.   9. Thickening and calcification of the anterior and posterior mitral valve leaflets.  10. Moderate tricuspid regurgitation.  11. Estimated pulmonary artery systolic pressure is 55.3 mmHg assuming a right atrial pressure of 8 mmHg, which is consistent with moderate pulmonary hypertension.  12. Mitral valve mean gradient is 4.0 mmHg consistent with mild mitral stenosis.  13. The aortic valve mean gradient is 3.0 mmHg consistent with normally opening aortic valve.    MD Steffi Electronically signed on 2020 at 10:55:05 AM      < end of copied text >    CATH:   < from: Cardiac Cath Lab - Adult (20 @ 16:42) >  VENTRICLES: No left ventriculogram was performed.  CORONARY VESSELS: The coronary circulation is right dominant.  LM:   --  LM: The vessel was normal sized. Angiography showed minor luminal  irregularities with no flow limiting lesions.  LAD:   --  LAD: The vessel was normal sized and moderately calcified.  Angiography showed moderate atherosclerosis.  --  Mid LAD: There was a discrete 50 % stenosis.  CX:   --  Circumflex: The vessel was large sized (dominant).  --  Ostial circumflex: There was a discrete 40 % stenosis.  --  Distal circumflex: There was a discrete 60 % stenosis. It does not  appear amenable to intervention.  --  OM1: The vessel was normal sized. There was a diffuse 40 % stenosis.  RCA:   --  RCA: Normal. The vessel was very small sized.  COMPLICATIONS: No complications occurred during the cath lab visit.  DIAGNOSTIC RECOMMENDATIONS: Overall non-obstructive CAD. Would maximize  medical therapy for diastolic CHF and CAD.  Prepared and signed by  Eliel Powell MD    < end of copied text >                            CARDIOLOGY REVIEW: Personally visualized and reviewed  Telemetry Last 24h: NSR/Afib , frequent atrial ectopy                              DAILY WEIGHTS - 48 HOUR TREND   Daily Weight in k.9 (06 Aug 2020 05:02)                             INTAKE AND OUTPUT - 48 HOUR TREND     20 @ 07:01  -  20 @ 07:00  --------------------------------------------------------  IN:  Total IN: 0 mL    OUT:    Chest Tube: 1105 mL    Voided: 625 mL  Total OUT: 1730 mL    Total NET: -1730 mL      20 @ 07:01  -  20 @ 07:00  --------------------------------------------------------  IN:  Total IN: 0 mL    OUT:    Bulb: 40 mL    Chest Tube: 150 mL  Total OUT: 190 mL    Total NET: -190 mL          HOME MEDICATIONS:  atorvastatin 10 mg oral tablet: 1 tab(s) orally once a day (05 Aug 2020 14:50)  Combigan 0.2%-0.5% ophthalmic solution: 1 drop(s) to each affected eye every 12 hours (05 Aug 2020 14:50)  glipiZIDE 10 mg oral tablet: 1 tab(s) orally 2 times a day (05 Aug 2020 14:50)  Januvia 50 mg oral tablet: 1 tab(s) orally once a day (05 Aug 2020 14:50)  Metoprolol Succinate ER 25 mg oral tablet, extended release: 1 tab(s) orally once a day (05 Aug 2020 14:50)  pantoprazole 20 mg oral delayed release tablet: 1 tab(s) orally once a day (05 Aug 2020 14:50)  Santyl 250 units/g topical ointment: Apply topically to affected area once a day (05 Aug 2020 14:50)  torsemide 10 mg oral tablet: 1 tab(s) orally once a day (05 Aug 2020 14:50)  Veltassa 8.4 g oral powder for reconstitution:  (05 Aug 2020 14:50)  Xalatan 0.005% ophthalmic solution: 1 drop(s) to each affected eye once a day (in the evening) (05 Aug 2020 14:50)                             Current Admission Active Medications    acetaminophen   Tablet .. 650 milliGRAM(s) Oral every 6 hours PRN Temp greater or equal to 38C (100.4F), Mild Pain (1 - 3), Moderate Pain (4 - 6)  ALPRAZolam 0.25 milliGRAM(s) Oral daily PRN anxiety  aspirin  chewable 81 milliGRAM(s) Oral daily  atorvastatin 20 milliGRAM(s) Oral at bedtime  heparin   Injectable 5000 Unit(s) SubCutaneous every 8 hours  lidocaine 1% Injectable 10 milliLiter(s) Local Injection once  metoprolol succinate ER 25 milliGRAM(s) Oral daily  pantoprazole    Tablet 40 milliGRAM(s) Oral before breakfast  torsemide 10 milliGRAM(s) Oral daily                        PHYSICAL EXAM:    Vital Signs Last 24 Hrs  T(C): 37.6 (06 Aug 2020 08:23), Max: 37.6 (06 Aug 2020 08:23)  T(F): 99.6 (06 Aug 2020 08:23), Max: 99.6 (06 Aug 2020 08:23)  HR: 103 (06 Aug 2020 08:23) (77 - 103)  BP: 101/56 (06 Aug 2020 08:23) (93/42 - 143/62)  BP(mean): --  RR: 16 (06 Aug 2020 08:23) (16 - 18)  SpO2: 93% (06 Aug 2020 08:23) (93% - 100%)    GENERAL: NAD  NECK: Supple, No JVD  NERVOUS SYSTEM:  Alert & Oriented X3, non focal neuro exam.   CHEST/LUNG: diminished lungs , No rales, rhonchi, wheezing, or rubs  HEART: Regular rate and rhythm; s1 and s2 auscultated, No murmurs, rubs, or gallops  ABDOMEN: Soft, Nontender, Nondistended; Bowel sounds present and normoactive.   EXTREMITIES:  2+ Peripheral Pulses, No clubbing, cyanosis, or edema  CATH SITE: Right wrist benign s/p cath.  No bleeding, no ecchymosis, no hematoma. Extremity Warm to touch, with palpable distal pulses, and brisk capillary refill.

## 2020-08-06 NOTE — PROGRESS NOTE ADULT - SUBJECTIVE AND OBJECTIVE BOX
Patient is a 87y old  Male who presents with a chief complaint of SOB, CP,m possible ACS , CHF (06 Aug 2020 11:35)      INTERVAL HPI/OVERNIGHT EVENTS:  Patient seen awake in bed. Reports feeling tired, did not sleep most of the night due to discomfort, denies pain but reports discomfort.       MEDICATIONS  (STANDING):  aspirin  chewable 81 milliGRAM(s) Oral daily  atorvastatin 20 milliGRAM(s) Oral at bedtime  heparin  Infusion.  Unit(s)/Hr (11 mL/Hr) IV Continuous <Continuous>  lidocaine 1% Injectable 10 milliLiter(s) Local Injection once  metoprolol succinate ER 25 milliGRAM(s) Oral daily  pantoprazole    Tablet 40 milliGRAM(s) Oral before breakfast  torsemide 10 milliGRAM(s) Oral daily    MEDICATIONS  (PRN):  acetaminophen   Tablet .. 650 milliGRAM(s) Oral every 6 hours PRN Temp greater or equal to 38C (100.4F), Mild Pain (1 - 3), Moderate Pain (4 - 6)  ALPRAZolam 0.25 milliGRAM(s) Oral daily PRN anxiety  heparin   Injectable 4500 Unit(s) IV Push every 6 hours PRN For aPTT less than 40  heparin   Injectable 2000 Unit(s) IV Push every 6 hours PRN For aPTT between 40 - 57      Allergies    No Known Allergies    Intolerances        REVIEW OF SYSTEMS:  CONSTITUTIONAL: No fever, weight loss, or fatigue  EYES: No eye pain, visual disturbances, or discharge  ENMT:  No difficulty hearing, tinnitus, vertigo; No sinus or throat pain  NECK: No pain or stiffness  RESPIRATORY: No cough, wheezing, chills or hemoptysis; No shortness of breath  CARDIOVASCULAR: No chest pain, palpitations, or lightheadedness  GASTROINTESTINAL: No abdominal or epigastric pain. No nausea, vomiting, or hematemesis; No diarrhea or constipation. No melena or hematochezia.  GENITOURINARY: No dysuria, frequency, hematuria, or incontinence  NEUROLOGICAL: No headaches, vertigo, memory loss, loss of strength, numbness, or tremors  SKIN: No itching, burning, rashes, or lesions   LYMPH NODES: No enlarged glands  ENDOCRINE: No heat or cold intolerance; No hair loss; No polydipsia or polyuria  MUSCULOSKELETAL: No back pain  PSYCHIATRIC: No depression, anxiety, or mood swings  HEME/LYMPH: No easy bruising, or bleeding gums  ALLERGY AND IMMUNOLOGIC: No hives or eczema    PHYSICAL EXAM:  GENERAL: NAD, well-groomed, well-developed  HEAD:  Atraumatic, Normocephalic  EYES: EOMI, PERRLA, conjunctiva and sclera clear  ENMT: Moist mucous membranes, Good dentition, No lesions; No tonsillar erythema, exudates, or enlargement   NECK: Supple, No JVD appreciated, Normal thyroid  NERVOUS SYSTEM:  Alert & Oriented X3, Good concentration; Bilateral LE mobile, sensation to light touch intact  CHEST/LUNG: Clear to auscultation bilaterally; No rales, rhonchi, wheezing, or rubs  HEART: Regular rate and rhythm; No murmurs, rubs, or gallops  ABDOMEN: Soft, Nontender, Nondistended; Bowel sounds present  EXTREMITIES:  2+ Peripheral Pulses, No clubbing or cyanosis  LYMPH: No lymphadenopathy noted  SKIN: No rashes or lesions  INCISION:  Dressing dry and intact    Vital Signs Last 24 Hrs  T(C): 36.6 (06 Aug 2020 15:14), Max: 37.6 (06 Aug 2020 08:23)  T(F): 97.8 (06 Aug 2020 15:14), Max: 99.6 (06 Aug 2020 08:23)  HR: 94 (06 Aug 2020 15:14) (77 - 103)  BP: 112/63 (06 Aug 2020 15:14) (90/55 - 143/62)  BP(mean): --  RR: 16 (06 Aug 2020 15:14) (16 - 18)  SpO2: 95% (06 Aug 2020 15:14) (93% - 100%)    LABS:                        10.5   8.39  )-----------( 204      ( 06 Aug 2020 06:59 )             37.6     06 Aug 2020 06:59    142    |  99     |  35.0   ----------------------------<  126    4.4     |  29.0   |  1.37     Ca    8.4        06 Aug 2020 06:59  Mg     1.7       06 Aug 2020 06:59      PTT - ( 06 Aug 2020 11:09 )  PTT:30.8 sec    CAPILLARY BLOOD GLUCOSE      POCT Blood Glucose.: 162 mg/dL (06 Aug 2020 12:01)  POCT Blood Glucose.: 147 mg/dL (06 Aug 2020 08:11)  POCT Blood Glucose.: 278 mg/dL (05 Aug 2020 21:29)  POCT Blood Glucose.: 151 mg/dL (05 Aug 2020 19:22)           [x] Consultant(s) Notes Reviewed Patient is a 87y old  Male who presents with a chief complaint of SOB, CP,m possible ACS , CHF (06 Aug 2020 11:35)      INTERVAL HPI/OVERNIGHT EVENTS:  Patient seen awake in bed. Reports feeling tired, did not sleep most of the night due to discomfort, denies pain but reports discomfort. Denies SOB or chest pain. Reports frequent urination and travels to the bathroom.       MEDICATIONS  (STANDING):  aspirin  chewable 81 milliGRAM(s) Oral daily  atorvastatin 20 milliGRAM(s) Oral at bedtime  heparin  Infusion.  Unit(s)/Hr (11 mL/Hr) IV Continuous <Continuous>  lidocaine 1% Injectable 10 milliLiter(s) Local Injection once  metoprolol succinate ER 25 milliGRAM(s) Oral daily  pantoprazole    Tablet 40 milliGRAM(s) Oral before breakfast  torsemide 10 milliGRAM(s) Oral daily    MEDICATIONS  (PRN):  acetaminophen   Tablet .. 650 milliGRAM(s) Oral every 6 hours PRN Temp greater or equal to 38C (100.4F), Mild Pain (1 - 3), Moderate Pain (4 - 6)  ALPRAZolam 0.25 milliGRAM(s) Oral daily PRN anxiety  heparin   Injectable 4500 Unit(s) IV Push every 6 hours PRN For aPTT less than 40  heparin   Injectable 2000 Unit(s) IV Push every 6 hours PRN For aPTT between 40 - 57      Allergies    No Known Allergies    Intolerances        REVIEW OF SYSTEMS:  CONSTITUTIONAL: No fever, weight loss, or fatigue  EYES: No eye pain, visual disturbances, or discharge  ENMT:  No difficulty hearing, tinnitus, vertigo; No sinus or throat pain  NECK: No pain or stiffness  RESPIRATORY: Pain at R chest tube site. No cough, wheezing, chills or hemoptysis; No shortness of breath  CARDIOVASCULAR: No chest pain, palpitations, or lightheadedness  GASTROINTESTINAL: No abdominal or epigastric pain. No nausea, vomiting, or hematemesis; No diarrhea or constipation. No melena or hematochezia.  GENITOURINARY: No dysuria, frequency, hematuria, or incontinence  NEUROLOGICAL: No headaches, vertigo, memory loss, loss of strength, numbness, or tremors  SKIN: No itching, burning, rashes, or lesions   LYMPH NODES: No enlarged glands  ENDOCRINE: No heat or cold intolerance; No hair loss; No polydipsia or polyuria  MUSCULOSKELETAL: No back pain  PSYCHIATRIC: No depression, anxiety, or mood swings  HEME/LYMPH: No easy bruising, or bleeding gums  ALLERGY AND IMMUNOLOGIC: No hives or eczema    PHYSICAL EXAM:  GENERAL: NAD, well-groomed, well-developed. Appears fatigued, falling asleep during discussion  HEAD:  Atraumatic, Normocephalic  EYES: EOMI, PERRLA, conjunctiva and sclera clear  ENMT: Moist mucous membranes, upper dentures  NECK: Supple, No JVD appreciated, Normal thyroid  NERVOUS SYSTEM:  Alert & Oriented X3, Good concentration; Bilateral LE mobile, sensation to light touch intact  CHEST/LUNG: Diminished breath sounds at LLL and RLL; No rales, rhonchi, wheezing, or rubs appreciated  HEART: Regular rate and rhythm; No murmurs, rubs, or gallops  ABDOMEN: Soft, Nontender, Nondistended  EXTREMITIES:  2+ Peripheral Pulses, No clubbing or cyanosis  LYMPH: No lymphadenopathy noted  SKIN: No rashes or lesions  INCISION:  Dressing on R posterior thorax dry and intact, no bleeding      Vital Signs Last 24 Hrs  T(C): 36.6 (06 Aug 2020 15:14), Max: 37.6 (06 Aug 2020 08:23)  T(F): 97.8 (06 Aug 2020 15:14), Max: 99.6 (06 Aug 2020 08:23)  HR: 94 (06 Aug 2020 15:14) (77 - 103)  BP: 112/63 (06 Aug 2020 15:14) (90/55 - 143/62)  BP(mean): --  RR: 16 (06 Aug 2020 15:14) (16 - 18)  SpO2: 95% (06 Aug 2020 15:14) (93% - 100%)    LABS:                        10.5   8.39  )-----------( 204      ( 06 Aug 2020 06:59 )             37.6     06 Aug 2020 06:59    142    |  99     |  35.0   ----------------------------<  126    4.4     |  29.0   |  1.37     Ca    8.4        06 Aug 2020 06:59  Mg     1.7       06 Aug 2020 06:59      PTT - ( 06 Aug 2020 11:09 )  PTT:30.8 sec    CAPILLARY BLOOD GLUCOSE      POCT Blood Glucose.: 162 mg/dL (06 Aug 2020 12:01)  POCT Blood Glucose.: 147 mg/dL (06 Aug 2020 08:11)  POCT Blood Glucose.: 278 mg/dL (05 Aug 2020 21:29)  POCT Blood Glucose.: 151 mg/dL (05 Aug 2020 19:22)           [x] Consultant(s) Notes Reviewed

## 2020-08-06 NOTE — PROGRESS NOTE ADULT - SUBJECTIVE AND OBJECTIVE BOX
HPI:  87y old  Male with PMH/ HFpEF, CAD, HTN, HLD, DM type 2 on oral hypoglycemics, CKD stage III; rib fractures complicated by L-sided pleural effusion, requiring thoracentesis ~2 years ago; B/L pleural effusions, s/p L thoracentesis- 20, transudative, chronic lightheadedness, GERD, who presents with a chief complaint of MEJÍA and gait instability. Pt states that since about 2 mos ago he has been feeling short of breath, more with exertion -- walking around his apartment or with bending to put his shoes on. Pt states that today his visiting nurse became concerned about his breathing, him getting "winded" with minimal exertion and abnormal lung sounds. Pt states that he has been feeling the same since about 2 mos ago. Pt states that sometimes he feels chest burning if he eats late or he eats heavy meal, especially meat. pt states that he lost about 10 lbs since about 2 mos ago due to has been taking Abx for L heal ulcer and his appetite has been poor. Pt states that his doctor discontinued BP medications 2/2 low BP. Pt states that he has been drinking 0.5 L bottles of water-- about 2-3 every day. Pt denies any CP/dizziness/diaphoresis/F/C/cough/abdominal pain/sick contacts/other complaints.   Above HPI per cardio  read and agreed with verified with patient -In Addition , he was started on Zinc per his PCP 2 weeks ago when he visited for SOB , he denied any edema or orthopnea , denied fever or cough or sick contact   States he is anxious about the cardiac cath and i explained the cath procedure , discussed that he might need thoracentesis with bilateral pleural effusions (04 Aug 2020 13:08)    Brief summary:      Overnight events: none      PAST MEDICAL & SURGICAL HISTORY:  DM (diabetes mellitus)  HLD (hyperlipidemia)  HTN (hypertension)  PVCs (premature ventricular contractions)  CHF (congestive heart failure)  H/O right inguinal hernia repair        acetaminophen   Tablet .. 650 milliGRAM(s) Oral every 6 hours PRN  ALPRAZolam 0.25 milliGRAM(s) Oral daily PRN  aspirin  chewable 81 milliGRAM(s) Oral daily  atorvastatin 20 milliGRAM(s) Oral at bedtime  heparin   Injectable 4500 Unit(s) IV Push every 6 hours PRN  heparin   Injectable 2000 Unit(s) IV Push every 6 hours PRN  heparin  Infusion.  Unit(s)/Hr IV Continuous <Continuous>  lidocaine 1% Injectable 10 milliLiter(s) Local Injection once  metoprolol succinate ER 25 milliGRAM(s) Oral daily  pantoprazole    Tablet 40 milliGRAM(s) Oral before breakfast  torsemide 10 milliGRAM(s) Oral daily  MEDICATIONS  (PRN):  acetaminophen   Tablet .. 650 milliGRAM(s) Oral every 6 hours PRN Temp greater or equal to 38C (100.4F), Mild Pain (1 - 3), Moderate Pain (4 - 6)  ALPRAZolam 0.25 milliGRAM(s) Oral daily PRN anxiety  heparin   Injectable 4500 Unit(s) IV Push every 6 hours PRN For aPTT less than 40  heparin   Injectable 2000 Unit(s) IV Push every 6 hours PRN For aPTT between 40 - 57      Daily     Daily Weight in k.9 (06 Aug 2020 05:02)                              10.5   8.39  )-----------( 204      ( 06 Aug 2020 06:59 )             37.6       142  |  99  |  35.0<H>  ----------------------------<  126<H>  4.4   |  29.0  |  1.37<H>    Ca    8.4<L>      06 Aug 2020 06:59  Mg     1.7     08-        PTT - ( 06 Aug 2020 11:09 )  PTT:30.8 sec      Objective:  T(C): 37.6 (20 @ 08:23), Max: 37.6 (20 @ 08:23)  HR: 103 (20 @ 08:23) (77 - 103)  BP: 101/56 (20 @ 08:23) (93/42 - 143/62)  RR: 16 (20 @ 08:23) (16 - 18)  SpO2: 93% (20 @ 08:23) (93% - 100%)  Wt(kg): --CAPILLARY BLOOD GLUCOSE      POCT Blood Glucose.: 147 mg/dL (06 Aug 2020 08:11)  POCT Blood Glucose.: 278 mg/dL (05 Aug 2020 21:29)  POCT Blood Glucose.: 151 mg/dL (05 Aug 2020 19:22)  POCT Blood Glucose.: 171 mg/dL (05 Aug 2020 12:04)  I&O's Summary    05 Aug 2020 07:01  -  06 Aug 2020 07:00  --------------------------------------------------------  IN: 480 mL / OUT: 190 mL / NET: 290 mL    06 Aug 2020 07:  -  06 Aug 2020 11:35  --------------------------------------------------------  IN: 106.5 mL / OUT: 0 mL / NET: 106.5 mL        Physical Exam  Neuro: A+O x 3, non-focal, speech clear and intact  Pulm: CTA, equal bilaterally, no wheezes, rales ,rhonchi  CV: RRR,+S1S2, no m/r/g  Abd: soft, NT, ND, +BS  Ext: +DP Pulses b/l, +PT pulses, no edema  Chest tubes: RT pleural pigtail found to be kinked under patient, drained approx 60cc after unkinked. Now draining appropriately. dressing c/d/i, no signs of infection.    Imaging:  CXR: < from: Xray Chest 1 View- PORTABLE-Routine (20 @ 09:33) >  FINDINGS:    Single frontal view of the chest demonstrates right lower lobe pigtail catheter, unchanged. Trace right apical pneumothorax. Right lung is clear. Small left lower lobe effusion/atelectasis, unchanged. The cardiomediastinal silhouette is enlarged. No acute osseous abnormalities. Overlying EKG leads and wires are noted    IMPRESSION: Trace right apical pneumothorax.    < end of copied text >

## 2020-08-06 NOTE — CONSULT NOTE ADULT - SUBJECTIVE AND OBJECTIVE BOX
Patient is a 87y old  Male who presents with a chief complaint of SOB, CP,m possible ACS , CHF (06 Aug 2020 15:47)      HPI:  87y old  Male with PMH/ HFpEF, CAD, HTN, HLD, DM type 2 on oral hypoglycemics, CKD stage III; rib fractures complicated by L-sided pleural effusion, requiring thoracentesis ~2 years ago; B/L pleural effusions, s/p L thoracentesis- 01/02/20, transudative, chronic lightheadedness, GERD, who presents with a chief complaint of MEJÍA and gait instability. Pt states that since about 2 mos ago he has been feeling short of breath, more with exertion -- walking around his apartment or with bending to put his shoes on. Pt states that today his visiting nurse became concerned about his breathing, him getting "winded" with minimal exertion and abnormal lung sounds. Pt states that he has been feeling the same since about 2 mos ago. Pt states that sometimes he feels chest burning if he eats late or he eats heavy meal, especially meat. pt states that he lost about 10 lbs since about 2 mos ago due to has been taking Abx for L heal ulcer and his appetite has been poor. Pt states that his doctor discontinued BP medications 2/2 low BP. Pt states that he has been drinking 0.5 L bottles of water-- about 2-3 every day. Pt denies any CP/dizziness/diaphoresis/F/C/cough/abdominal pain/sick contacts/other complaints.   Above HPI per cardio  read and agreed with verified with patient -In Addition , he was started on Zinc per his PCP 2 weeks ago when he visited for SOB , he denied any edema or orthopnea , denied fever or cough or sick contact   States he is anxious about the cardiac cath and i explained the cath procedure , discussed that he might need thoracentesis with bilateral pleural effusions.    History as above on admission. Podiatry consulted for Left heel ulcer. Pt states he has been following up with Dr. Erazo as outpatient and has been getting weekly debridements. States he had a bone biopsy done last week, was supposed to get the results this week but missed his appt due to being admitted. Denies f/c/n/v.     PAST MEDICAL & SURGICAL HISTORY:  DM (diabetes mellitus)  HLD (hyperlipidemia)  HTN (hypertension)  PVCs (premature ventricular contractions)  CHF (congestive heart failure)  H/O right inguinal hernia repair    Allergies: No Known Allergies    Medications: acetaminophen   Tablet .. 650 milliGRAM(s) Oral every 6 hours PRN  ALPRAZolam 0.25 milliGRAM(s) Oral daily PRN  aspirin  chewable 81 milliGRAM(s) Oral daily  atorvastatin 20 milliGRAM(s) Oral at bedtime  heparin   Injectable 4500 Unit(s) IV Push every 6 hours PRN  heparin   Injectable 2000 Unit(s) IV Push every 6 hours PRN  heparin  Infusion.  Unit(s)/Hr IV Continuous <Continuous>  lidocaine 1% Injectable 10 milliLiter(s) Local Injection once  metoprolol succinate ER 25 milliGRAM(s) Oral daily  pantoprazole    Tablet 40 milliGRAM(s) Oral before breakfast  povidone iodine 10% Solution 1 Application(s) Topical two times a day  torsemide 10 milliGRAM(s) Oral daily    FH:FAMILY HISTORY:  FH: CAD (coronary artery disease): Mother    SH:     Vital Signs Last 24 Hrs  T(C): 36.6 (06 Aug 2020 15:14), Max: 37.6 (06 Aug 2020 08:23)  T(F): 97.8 (06 Aug 2020 15:14), Max: 99.6 (06 Aug 2020 08:23)  HR: 94 (06 Aug 2020 15:14) (79 - 103)  BP: 112/63 (06 Aug 2020 15:14) (90/55 - 143/62)  BP(mean): --  RR: 16 (06 Aug 2020 15:14) (16 - 18)  SpO2: 95% (06 Aug 2020 15:14) (93% - 100%)    LABS                        10.5   8.39  )-----------( 204      ( 06 Aug 2020 06:59 )             37.6               08-06    142  |  99  |  35.0<H>  ----------------------------<  126<H>  4.4   |  29.0  |  1.37<H>    Ca    8.4<L>      06 Aug 2020 06:59  Mg     1.7     08-06    ROS  REVIEW OF SYSTEMS:  CONSTITUTIONAL: No fever, weight loss, or fatigue  RESPIRATORY: No cough, wheezing, chills or hemoptysis; No shortness of breath  CARDIOVASCULAR: No chest pain, palpitations, dizziness, or leg swelling  SKIN: Left heel ulcer      PHYSICAL EXAM  GEN: CONNIE ACOSTA is a pleasant well-nourished, well developed 87y Male in no acute distress, alert awake, and oriented to person, place and time.   LE Focused:    Vasc:  DP/PT pulses lightly palpable, cap refill <5 secs  Derm: Left lateral heel ulcer measuring ~3.0 cm x 1.0 cm x 1.0 cm with fibrogranular base and necrotic borders. No drainage noted, probes deep but not to bone.   Neuro: Light touch sensation grossly intact  MSK: Pain on palpation to Left heel ulcer.     Imaging:     Cultures: taken    A: Left heel ulcer    P:  Patient evaluated, chart reviewed  Applied betadine/DSD  Wound culture taken  Discussed with Dr. Patiño Patient is a 87y old  Male who presents with a chief complaint of SOB, CP,m possible ACS , CHF (06 Aug 2020 15:47)      HPI:  87y old  Male with PMH/ HFpEF, CAD, HTN, HLD, DM type 2 on oral hypoglycemics, CKD stage III; rib fractures complicated by L-sided pleural effusion, requiring thoracentesis ~2 years ago; B/L pleural effusions, s/p L thoracentesis- 01/02/20, transudative, chronic lightheadedness, GERD, who presents with a chief complaint of MEJÍA and gait instability. Pt states that since about 2 mos ago he has been feeling short of breath, more with exertion -- walking around his apartment or with bending to put his shoes on. Pt states that today his visiting nurse became concerned about his breathing, him getting "winded" with minimal exertion and abnormal lung sounds. Pt states that he has been feeling the same since about 2 mos ago. Pt states that sometimes he feels chest burning if he eats late or he eats heavy meal, especially meat. pt states that he lost about 10 lbs since about 2 mos ago due to has been taking Abx for L heal ulcer and his appetite has been poor. Pt states that his doctor discontinued BP medications 2/2 low BP. Pt states that he has been drinking 0.5 L bottles of water-- about 2-3 every day. Pt denies any CP/dizziness/diaphoresis/F/C/cough/abdominal pain/sick contacts/other complaints.   Above HPI per cardio  read and agreed with verified with patient -In Addition , he was started on Zinc per his PCP 2 weeks ago when he visited for SOB , he denied any edema or orthopnea , denied fever or cough or sick contact   States he is anxious about the cardiac cath and i explained the cath procedure , discussed that he might need thoracentesis with bilateral pleural effusions.    History as above on admission. Podiatry consulted for Left heel ulcer. Pt states he has been following up with Dr. Erazo as outpatient and has been getting weekly debridements. States he had a bone biopsy done last week, was supposed to get the results this week but missed his appt due to being admitted. Denies f/c/n/v.     PAST MEDICAL & SURGICAL HISTORY:  DM (diabetes mellitus)  HLD (hyperlipidemia)  HTN (hypertension)  PVCs (premature ventricular contractions)  CHF (congestive heart failure)  H/O right inguinal hernia repair    Allergies: No Known Allergies    Medications: acetaminophen   Tablet .. 650 milliGRAM(s) Oral every 6 hours PRN  ALPRAZolam 0.25 milliGRAM(s) Oral daily PRN  aspirin  chewable 81 milliGRAM(s) Oral daily  atorvastatin 20 milliGRAM(s) Oral at bedtime  heparin   Injectable 4500 Unit(s) IV Push every 6 hours PRN  heparin   Injectable 2000 Unit(s) IV Push every 6 hours PRN  heparin  Infusion.  Unit(s)/Hr IV Continuous <Continuous>  lidocaine 1% Injectable 10 milliLiter(s) Local Injection once  metoprolol succinate ER 25 milliGRAM(s) Oral daily  pantoprazole    Tablet 40 milliGRAM(s) Oral before breakfast  povidone iodine 10% Solution 1 Application(s) Topical two times a day  torsemide 10 milliGRAM(s) Oral daily    FH:FAMILY HISTORY:  FH: CAD (coronary artery disease): Mother    SH:     Vital Signs Last 24 Hrs  T(C): 36.6 (06 Aug 2020 15:14), Max: 37.6 (06 Aug 2020 08:23)  T(F): 97.8 (06 Aug 2020 15:14), Max: 99.6 (06 Aug 2020 08:23)  HR: 94 (06 Aug 2020 15:14) (79 - 103)  BP: 112/63 (06 Aug 2020 15:14) (90/55 - 143/62)  BP(mean): --  RR: 16 (06 Aug 2020 15:14) (16 - 18)  SpO2: 95% (06 Aug 2020 15:14) (93% - 100%)    LABS                        10.5   8.39  )-----------( 204      ( 06 Aug 2020 06:59 )             37.6               08-06    142  |  99  |  35.0<H>  ----------------------------<  126<H>  4.4   |  29.0  |  1.37<H>    Ca    8.4<L>      06 Aug 2020 06:59  Mg     1.7     08-06    ROS  REVIEW OF SYSTEMS:  CONSTITUTIONAL: No fever, weight loss, or fatigue  RESPIRATORY: No cough, wheezing, chills or hemoptysis; No shortness of breath  CARDIOVASCULAR: No chest pain, palpitations, dizziness, or leg swelling  SKIN: Left heel ulcer      PHYSICAL EXAM  GEN: CONNIE ACOSTA is a pleasant well-nourished, well developed 87y Male in no acute distress, alert awake, and oriented to person, place and time.   LE Focused:    Vasc:  DP/PT pulses lightly palpable, cap refill <5 secs  Derm: Left lateral heel ulcer measuring ~3.0 cm x 1.0 cm x 1.0 cm with fibrogranular base and necrotic borders. No drainage noted, probes deep but not to bone.   Neuro: Light touch sensation grossly intact  MSK: Pain on palpation to Left heel ulcer.     Imaging: Xrays ordered    Cultures: taken    A: Left heel ulcer    P:  Patient evaluated, chart reviewed  Discussed diagnosis and treatment plan with patient  Xrays ordered  ABIS ordered  Applied betadine/DSD  Wound culture taken - pending results  Recommend offloading to LLE while in bed  Podiatry to follow while in house  Discussed with Dr. Patiño

## 2020-08-07 LAB
ANION GAP SERPL CALC-SCNC: 9 MMOL/L — SIGNIFICANT CHANGE UP (ref 5–17)
APTT BLD: 101.5 SEC — HIGH (ref 27.5–35.5)
APTT BLD: 71 SEC — HIGH (ref 27.5–35.5)
APTT BLD: 79.5 SEC — HIGH (ref 27.5–35.5)
BUN SERPL-MCNC: 34 MG/DL — HIGH (ref 8–20)
CALCIUM SERPL-MCNC: 8 MG/DL — LOW (ref 8.6–10.2)
CHLORIDE SERPL-SCNC: 98 MMOL/L — SIGNIFICANT CHANGE UP (ref 98–107)
CO2 SERPL-SCNC: 31 MMOL/L — HIGH (ref 22–29)
CREAT SERPL-MCNC: 1.29 MG/DL — SIGNIFICANT CHANGE UP (ref 0.5–1.3)
GLUCOSE BLDC GLUCOMTR-MCNC: 156 MG/DL — HIGH (ref 70–99)
GLUCOSE BLDC GLUCOMTR-MCNC: 229 MG/DL — HIGH (ref 70–99)
GLUCOSE BLDC GLUCOMTR-MCNC: 263 MG/DL — HIGH (ref 70–99)
GLUCOSE BLDC GLUCOMTR-MCNC: 278 MG/DL — HIGH (ref 70–99)
GLUCOSE SERPL-MCNC: 150 MG/DL — HIGH (ref 70–99)
HCT VFR BLD CALC: 35.7 % — LOW (ref 39–50)
HGB BLD-MCNC: 9.9 G/DL — LOW (ref 13–17)
INR BLD: 1.17 RATIO — HIGH (ref 0.88–1.16)
MAGNESIUM SERPL-MCNC: 2 MG/DL — SIGNIFICANT CHANGE UP (ref 1.6–2.6)
MCHC RBC-ENTMCNC: 24.1 PG — LOW (ref 27–34)
MCHC RBC-ENTMCNC: 27.7 GM/DL — LOW (ref 32–36)
MCV RBC AUTO: 86.9 FL — SIGNIFICANT CHANGE UP (ref 80–100)
PLATELET # BLD AUTO: 193 K/UL — SIGNIFICANT CHANGE UP (ref 150–400)
POTASSIUM SERPL-MCNC: 4.9 MMOL/L — SIGNIFICANT CHANGE UP (ref 3.5–5.3)
POTASSIUM SERPL-SCNC: 4.9 MMOL/L — SIGNIFICANT CHANGE UP (ref 3.5–5.3)
PROTHROM AB SERPL-ACNC: 13.5 SEC — SIGNIFICANT CHANGE UP (ref 10.6–13.6)
RBC # BLD: 4.11 M/UL — LOW (ref 4.2–5.8)
RBC # FLD: 23.7 % — HIGH (ref 10.3–14.5)
SODIUM SERPL-SCNC: 138 MMOL/L — SIGNIFICANT CHANGE UP (ref 135–145)
WBC # BLD: 6.59 K/UL — SIGNIFICANT CHANGE UP (ref 3.8–10.5)
WBC # FLD AUTO: 6.59 K/UL — SIGNIFICANT CHANGE UP (ref 3.8–10.5)

## 2020-08-07 PROCEDURE — 71045 X-RAY EXAM CHEST 1 VIEW: CPT | Mod: 26,77

## 2020-08-07 PROCEDURE — 99232 SBSQ HOSP IP/OBS MODERATE 35: CPT

## 2020-08-07 PROCEDURE — 71045 X-RAY EXAM CHEST 1 VIEW: CPT | Mod: 26

## 2020-08-07 PROCEDURE — 93923 UPR/LXTR ART STDY 3+ LVLS: CPT | Mod: 26

## 2020-08-07 PROCEDURE — 99231 SBSQ HOSP IP/OBS SF/LOW 25: CPT

## 2020-08-07 PROCEDURE — 99233 SBSQ HOSP IP/OBS HIGH 50: CPT

## 2020-08-07 PROCEDURE — 73630 X-RAY EXAM OF FOOT: CPT | Mod: 26,LT

## 2020-08-07 RX ORDER — HEPARIN SODIUM 5000 [USP'U]/ML
900 INJECTION INTRAVENOUS; SUBCUTANEOUS
Qty: 25000 | Refills: 0 | Status: DISCONTINUED | OUTPATIENT
Start: 2020-08-07 | End: 2020-08-10

## 2020-08-07 RX ORDER — GLUCAGON INJECTION, SOLUTION 0.5 MG/.1ML
1 INJECTION, SOLUTION SUBCUTANEOUS ONCE
Refills: 0 | Status: DISCONTINUED | OUTPATIENT
Start: 2020-08-07 | End: 2020-08-12

## 2020-08-07 RX ORDER — ALPRAZOLAM 0.25 MG
0.25 TABLET ORAL ONCE
Refills: 0 | Status: DISCONTINUED | OUTPATIENT
Start: 2020-08-07 | End: 2020-08-07

## 2020-08-07 RX ORDER — HEPARIN SODIUM 5000 [USP'U]/ML
4500 INJECTION INTRAVENOUS; SUBCUTANEOUS ONCE
Refills: 0 | Status: DISCONTINUED | OUTPATIENT
Start: 2020-08-07 | End: 2020-08-07

## 2020-08-07 RX ORDER — DEXTROSE 50 % IN WATER 50 %
25 SYRINGE (ML) INTRAVENOUS ONCE
Refills: 0 | Status: DISCONTINUED | OUTPATIENT
Start: 2020-08-07 | End: 2020-08-12

## 2020-08-07 RX ORDER — HEPARIN SODIUM 5000 [USP'U]/ML
2000 INJECTION INTRAVENOUS; SUBCUTANEOUS EVERY 6 HOURS
Refills: 0 | Status: DISCONTINUED | OUTPATIENT
Start: 2020-08-07 | End: 2020-08-07

## 2020-08-07 RX ORDER — DEXTROSE 50 % IN WATER 50 %
12.5 SYRINGE (ML) INTRAVENOUS ONCE
Refills: 0 | Status: DISCONTINUED | OUTPATIENT
Start: 2020-08-07 | End: 2020-08-12

## 2020-08-07 RX ORDER — HEPARIN SODIUM 5000 [USP'U]/ML
INJECTION INTRAVENOUS; SUBCUTANEOUS
Qty: 25000 | Refills: 0 | Status: DISCONTINUED | OUTPATIENT
Start: 2020-08-07 | End: 2020-08-07

## 2020-08-07 RX ORDER — DEXTROSE 50 % IN WATER 50 %
15 SYRINGE (ML) INTRAVENOUS ONCE
Refills: 0 | Status: DISCONTINUED | OUTPATIENT
Start: 2020-08-07 | End: 2020-08-12

## 2020-08-07 RX ORDER — SODIUM CHLORIDE 9 MG/ML
1000 INJECTION, SOLUTION INTRAVENOUS
Refills: 0 | Status: DISCONTINUED | OUTPATIENT
Start: 2020-08-07 | End: 2020-08-12

## 2020-08-07 RX ORDER — INSULIN LISPRO 100/ML
VIAL (ML) SUBCUTANEOUS
Refills: 0 | Status: DISCONTINUED | OUTPATIENT
Start: 2020-08-07 | End: 2020-08-12

## 2020-08-07 RX ORDER — HEPARIN SODIUM 5000 [USP'U]/ML
4500 INJECTION INTRAVENOUS; SUBCUTANEOUS EVERY 6 HOURS
Refills: 0 | Status: DISCONTINUED | OUTPATIENT
Start: 2020-08-07 | End: 2020-08-10

## 2020-08-07 RX ORDER — HEPARIN SODIUM 5000 [USP'U]/ML
2000 INJECTION INTRAVENOUS; SUBCUTANEOUS EVERY 6 HOURS
Refills: 0 | Status: DISCONTINUED | OUTPATIENT
Start: 2020-08-07 | End: 2020-08-10

## 2020-08-07 RX ORDER — LATANOPROST 0.05 MG/ML
1 SOLUTION/ DROPS OPHTHALMIC; TOPICAL AT BEDTIME
Refills: 0 | Status: DISCONTINUED | OUTPATIENT
Start: 2020-08-07 | End: 2020-08-12

## 2020-08-07 RX ORDER — INSULIN LISPRO 100/ML
VIAL (ML) SUBCUTANEOUS AT BEDTIME
Refills: 0 | Status: DISCONTINUED | OUTPATIENT
Start: 2020-08-07 | End: 2020-08-12

## 2020-08-07 RX ORDER — HEPARIN SODIUM 5000 [USP'U]/ML
4500 INJECTION INTRAVENOUS; SUBCUTANEOUS EVERY 6 HOURS
Refills: 0 | Status: DISCONTINUED | OUTPATIENT
Start: 2020-08-07 | End: 2020-08-07

## 2020-08-07 RX ADMIN — Medication 1 APPLICATION(S): at 16:23

## 2020-08-07 RX ADMIN — Medication 1: at 22:40

## 2020-08-07 RX ADMIN — Medication 0.25 MILLIGRAM(S): at 22:35

## 2020-08-07 RX ADMIN — HEPARIN SODIUM 900 UNIT(S)/HR: 5000 INJECTION INTRAVENOUS; SUBCUTANEOUS at 16:20

## 2020-08-07 RX ADMIN — ATORVASTATIN CALCIUM 20 MILLIGRAM(S): 80 TABLET, FILM COATED ORAL at 22:35

## 2020-08-07 RX ADMIN — Medication 1 APPLICATION(S): at 05:47

## 2020-08-07 RX ADMIN — Medication 0.25 MILLIGRAM(S): at 03:39

## 2020-08-07 RX ADMIN — PANTOPRAZOLE SODIUM 40 MILLIGRAM(S): 20 TABLET, DELAYED RELEASE ORAL at 05:45

## 2020-08-07 RX ADMIN — HEPARIN SODIUM 900 UNIT(S)/HR: 5000 INJECTION INTRAVENOUS; SUBCUTANEOUS at 00:36

## 2020-08-07 RX ADMIN — Medication 25 MILLIGRAM(S): at 06:19

## 2020-08-07 RX ADMIN — LATANOPROST 1 DROP(S): 0.05 SOLUTION/ DROPS OPHTHALMIC; TOPICAL at 22:35

## 2020-08-07 RX ADMIN — Medication 81 MILLIGRAM(S): at 11:02

## 2020-08-07 RX ADMIN — Medication 1 APPLICATION(S): at 10:54

## 2020-08-07 RX ADMIN — Medication 10 MILLIGRAM(S): at 05:45

## 2020-08-07 RX ADMIN — HEPARIN SODIUM 900 UNIT(S)/HR: 5000 INJECTION INTRAVENOUS; SUBCUTANEOUS at 08:15

## 2020-08-07 NOTE — PROGRESS NOTE ADULT - SUBJECTIVE AND OBJECTIVE BOX
Patient seen and examined.  Denies CP, SOB, N/V.  Ct in place no airleak serosanguinuous drainage    T(C): 36.6 (08-07-20 @ 07:29)  T(F): 97.8 (08-07-20 @ 07:29)  HR: 85 (08-07-20 @ 08:19)  BP: 100/53 (08-07-20 @ 07:29)  BP(mean): --  ABP: --  ABP(mean): --  RR: 18 (08-07-20 @ 07:29)  SpO2: 94% (08-07-20 @ 07:29)  Wt(kg): --  CVP(mm Hg): --  CI: --  PA: --  PA(mean): --  PA(direct): --  SVRI: --      Physical Exam:  Gen: A&Ox3  Pulm:  barrel chest, diminished BS b/l bases L>R  CV:  S1S2, RRR, no m/r/g  Abd: +BS, soft, NT, ND  Ext:  +DP b/l, no c/c/e      I&O's Detail    06 Aug 2020 07:01  -  07 Aug 2020 07:00  --------------------------------------------------------  IN:    heparin  Infusion.: 54 mL    heparin  Infusion.: 141.5 mL    IV PiggyBack: 100 mL    Oral Fluid: 520 mL  Total IN: 815.5 mL    OUT:    Chest Tube: 780 mL    Voided: 500 mL  Total OUT: 1280 mL    Total NET: -464.5 mL                              9.9    6.59  )-----------( 193      ( 07 Aug 2020 07:08 )             35.7   08-07    138  |  98  |  34.0<H>  ----------------------------<  150<H>  4.9   |  31.0<H>  |  1.29    Ca    8.0<L>      07 Aug 2020 07:08  Mg     2.0     08-07    aPTT: 79.5 sec; PT: 13.5 sec; INR: 1.17 ratio  08-07-20 @ 07:08         CAPILLARY BLOOD GLUCOSE      POCT Blood Glucose.: 156 mg/dL (07 Aug 2020 07:57)        Medications:  acetaminophen   Tablet .. 650 milliGRAM(s) Oral every 6 hours PRN  ALPRAZolam 0.25 milliGRAM(s) Oral daily PRN  aspirin  chewable 81 milliGRAM(s) Oral daily  atorvastatin 20 milliGRAM(s) Oral at bedtime  collagenase Ointment 1 Application(s) Topical daily  Dakins Solution - 1/2 Strength 1 Application(s) Topical daily  heparin   Injectable 4500 Unit(s) IV Push every 6 hours PRN  heparin   Injectable 2000 Unit(s) IV Push every 6 hours PRN  heparin  Infusion. 900 Unit(s)/Hr IV Continuous <Continuous>  lidocaine 1% Injectable 10 milliLiter(s) Local Injection once  metoprolol succinate ER 25 milliGRAM(s) Oral daily  pantoprazole    Tablet 40 milliGRAM(s) Oral before breakfast  povidone iodine 10% Solution 1 Application(s) Topical two times a day  torsemide 10 milliGRAM(s) Oral daily      CXR:  L effusion, R pigtail in place no ptx.     Assessment:  Pt is a 87y year old Male  s/p R pigtail placement    Patent currently stable, NAD.    Plan:

## 2020-08-07 NOTE — PROGRESS NOTE ADULT - SUBJECTIVE AND OBJECTIVE BOX
[place] [facility]                                                        Office: [address] [address]                                                       Telephone: [phone]. Fax:[phone]                                                                             PROGRESS NOTE   Reason for follow up:  SOB, CP,m possible ACS , CHF                            Overnight: No new events.   Update:   Patient stable 70-80s on the monitor.  Chest tube in place for plural effusion -  serosanguinous drainage noted - seen by CTS - 780cc drained in 24h.    Subjective: "I have a  wound on my foot"   Complains of:  Nothing  Review of symptoms: Cardiac:  No chest pain. No dyspnea. No palpitations.  Respiratory: no cough. No dyspnea  Gastrointestinal: No diarrhea. No abdominal pain. No bleeding.     Past medical history: No updates.   Chronic conditions:   	 PMH/ HFpEF, CAD, HTN, HLD, DM type 2 on oral hypoglycemics, CKD stage III; rib fractures complicated by L-sided pleural effusion, requiring thoracentesis ~2 years ago; B/L pleural effusions, s/p L thoracentesis- 01/02/20, transudative, chronic lightheadedness, GERD,   Vitals:  T(C): 36.6 (08-07-20 @ 07:29), Max: 36.9 (08-06-20 @ 23:50)  HR: 85 (08-07-20 @ 08:19) (61 - 94)  BP: 100/53 (08-07-20 @ 07:29) (100/53 - 117/74)  RR: 18 (08-07-20 @ 07:29) (16 - 18)  SpO2: 94% (08-07-20 @ 07:29) (94% - 98%)  I&O's Summary  06 Aug 2020 07:01  -  07 Aug 2020 07:00  --------------------------------------------------------  IN: 815.5 mL / OUT: 1280 mL / NET: -464.5 mL  Weight (kg): 59 (08-06 @ 11:35)    PHYSICAL EXAM:  Appearance: Comfortable. No acute distress, thin cachetic male.    HEENT:  Head and neck: Atraumatic. Normocephalic.  Normal oral mucosa, PERRL, Neck is supple. No JVD, No carotid bruit.   Neurologic: A & O x 3, no focal deficits. EOMI , Cranial nerves are intact.  Lymphatic: No cervical lymphadenopathy  Cardiovascular: Irregular,  S1 S2, No murmur, rubs/gallops. No JVD, No edema  Respiratory: Lungs clear to auscultation, diminished bilaterally bases.    Gastrointestinal:  Soft, Non-tender, + BS  Lower Extremities: No edema  Psychiatry: Patient is calm. No agitation. Mood & affect appropriate  Skin: No rashes/ ecchymoses/cyanosis/ulcers visualized on the face, hands or feet.    CURRENT MEDICATIONS:  metoprolol succinate ER 25 milliGRAM(s) Oral daily  torsemide 10 milliGRAM(s) Oral daily  pantoprazole    Tablet  atorvastatin  aspirin  chewable  collagenase Ointment  Dakins Solution - 1/2 Strength  heparin  Infusion.  latanoprost 0.005% Ophthalmic Solution  povidone iodine 10% Solution    LABS:	 	                       9.9    6.59  )-----------( 193      ( 07 Aug 2020 07:08 )             35.7   08-07  138  |  98  |  34.0<H>  ----------------------------<  150<H>  4.9   |  31.0<H>  |  1.29  Ca    8.0<L>      07 Aug 2020 07:08  Mg     2.0     08-07  proBNP: Serum Pro-Brain Natriuretic Peptide: 45465 pg/mL (08-03 @ 20:46)  Lipid Profile: Date: 08-04 @ 07:49  Total cholesterol 73; Direct LDL: 28; HDL: 25; Triglycerides:101  HgA1c: TSH: Thyroid Stimulating Hormone, Serum: 6.57 uIU/mL    TELEMETRY: Reviewed [place] [facility]                                                        Office: [address] [address]                                                       Telephone: [phone]. Fax:[phone]                                                                             PROGRESS NOTE   Reason for follow up:  SOB, CP,m possible ACS , CHF                            Overnight: No new events.   Update:   Patient stable 70-80s on the monitor.  Chest tube in place for plural effusion -  serosanguinous drainage noted - seen by CTS - 780cc drained in 24h.    Subjective: "I have a  wound on my foot"   Complains of:  Nothing  Review of symptoms: Cardiac:  No chest pain. No dyspnea. No palpitations.  Respiratory: no cough. No dyspnea  Gastrointestinal: No diarrhea. No abdominal pain. No bleeding.     Past medical history: No updates.   Chronic conditions:   	 PMH/ HFpEF, CAD, HTN, HLD, DM type 2 on oral hypoglycemics, CKD stage III; rib fractures complicated by L-sided pleural effusion, requiring thoracentesis ~2 years ago; B/L pleural effusions, s/p L thoracentesis- 01/02/20, transudative, chronic lightheadedness, GERD,   Vitals:  T(C): 36.6 (08-07-20 @ 07:29), Max: 36.9 (08-06-20 @ 23:50)  HR: 85 (08-07-20 @ 08:19) (61 - 94)  BP: 100/53 (08-07-20 @ 07:29) (100/53 - 117/74)  RR: 18 (08-07-20 @ 07:29) (16 - 18)  SpO2: 94% (08-07-20 @ 07:29) (94% - 98%)  I&O's Summary  06 Aug 2020 07:01  -  07 Aug 2020 07:00  --------------------------------------------------------  IN: 815.5 mL / OUT: 1280 mL / NET: -464.5 mL  Weight (kg): 59 (08-06 @ 11:35)    PHYSICAL EXAM:  Appearance: Comfortable. No acute distress, thin cachetic male.    HEENT:  Head and neck: Atraumatic. Normocephalic.  Normal oral mucosa, PERRL, Neck is supple. No JVD, No carotid bruit.   Neurologic: A & O x 3, no focal deficits. EOMI , Cranial nerves are intact.  Lymphatic: No cervical lymphadenopathy  Cardiovascular: Irregular,  S1 S2, No murmur, rubs/gallops. No JVD, No edema  Respiratory: Lungs clear to auscultation, diminished bilaterally bases.    Gastrointestinal:  Soft, Non-tender, + BS  Lower Extremities: No edema  Psychiatry: Patient is calm. No agitation. Mood & affect appropriate  Skin: No rashes/ ecchymoses/cyanosis/ulcers visualized on the face, hands or feet.    CURRENT MEDICATIONS:  metoprolol succinate ER 25 milliGRAM(s) Oral daily  torsemide 10 milliGRAM(s) Oral daily  pantoprazole    Tablet  atorvastatin  aspirin  chewable  collagenase Ointment  Dakins Solution - 1/2 Strength  heparin  Infusion.  latanoprost 0.005% Ophthalmic Solution  povidone iodine 10% Solution    LABS:	 	                       9.9    6.59  )-----------( 193      ( 07 Aug 2020 07:08 )             35.7   08-07  138  |  98  |  34.0<H>  ----------------------------<  150<H>  4.9   |  31.0<H>  |  1.29  Ca    8.0<L>      07 Aug 2020 07:08  Mg     2.0     08-07  proBNP: Serum Pro-Brain Natriuretic Peptide: 51088 pg/mL (08-03 @ 20:46)  Lipid Profile: Date: 08-04 @ 07:49  Total cholesterol 73; Direct LDL: 28; HDL: 25; Triglycerides:101  HgA1c: TSH: Thyroid Stimulating Hormone, Serum: 6.57 uIU/mL    TELEMETRY: Reviewed

## 2020-08-07 NOTE — PROGRESS NOTE ADULT - ASSESSMENT
87y old  Male with PMH/ HFpEF, CAD, HTN, HLD, DM type 2 on oral hypoglycemics, CKD stage III; rib fractures complicated by L-sided pleural effusion, requiring thoracentesis ~2 years ago; B/L pleural effusions, s/p L thoracentesis- 01/02/20, transudative, chronic lightheadedness, GERD, who presents with a chief complaint of MEJÍA and gait instability. Pt states that since about 2 mos ago he has been feeling short of breath, more with exertion -- walking around his apartment or with bending to put his shoes on. Pt states that today his visiting nurse became concerned about his breathing, him getting "winded" with minimal exertion and abnormal lung sounds. Pt states that he has been feeling the same since about 2 mos ago. Pt states that sometimes he feels chest burning if he eats late or he eats heavy meal, especially meat. pt states that he lost about 10 lbs since about 2 mos ago due to has been taking Abx for L heal ulcer and his appetite has been poor. Pt states that his doctor discontinued BP medications 2/2 low BP. Pt states that he has been drinking 0.5 L bottles of water-- about 2-3 every day. Pt denies any CP/dizziness/diaphoresis/F/C/cough/abdominal pain/sick contacts/other complaints. Now s/p LHC which showed non obstructive disease and s/p right pigtail which is draining serosanguinous fluid.     S/P Coronary Angiogram   - non obstructive CAD  - no chest pain   - continue ASA, toprol, lipitor  - Diet/lifestyle modifications and medication compliance heavily reinforced   - Patient educated about wrist site care, signs and symptoms of complication post procedure, and plan of care moving forward. Patient verbalized understanding with teach-back.   - patient not a candidate for cardiac rehab secondary to: no intervention     HFpEF/Pleural Effusions  - right pleural effusion s/p pigtail placement  -  output -2350  - diminished lungs  - continue Toprol  - switch lasix to PO torsemide   - strict I/O  - daily weights  - Thoracic following   - ctyopathology pending   - 800mL fluid restriction     Atrial Fibrillation   - New onset afib  - CHADSVASC 5   - rates controlled  - continue telemetry   - start toprol xL 25mg daily   - Ct heparin infusion for AC  - eventual plan for DOAC 87y old  Male with PMH/ HFpEF, CAD, HTN, HLD, DM type 2 on oral hypoglycemics, CKD stage III; rib fractures complicated by L-sided pleural effusion, requiring thoracentesis ~2 years ago; B/L pleural effusions, s/p L thoracentesis- 01/02/20, transudative, chronic lightheadedness, GERD, who presents with a chief complaint of MEJÍA and gait instability. Pt states that since about 2 mos ago he has been feeling short of breath, more with exertion.   Now s/p LHC which showed non obstructive disease and s/p right pigtail which is draining serosanguinous fluid.   8/7/2020 Patient denies chest pain, sob, cough, wheezing, palpitations fever, chills,n/v/d or pain at site.    Patient stable 70-80s on the monitor.  Chest tube in place for plural effusion -  serosanguinous drainage noted - seen by CTS - 780cc drained in 24h.      S/P Coronary Angiogram   - non obstructive CAD  - no chest pain   - continue ASA, toprol, lipitor  - Diet/lifestyle modifications and medication compliance heavily reinforced   - Patient educated about wrist site care, signs and symptoms of complication post procedure, and plan of care moving forward. Patient verbalized understanding with teach-back.   - patient not a candidate for cardiac rehab secondary to: no intervention     HFpEF/Pleural Effusions  - right pleural effusion s/p pigtail placement  -  output -464  - diminished lungs  - continue Toprol  - switch lasix to PO torsemide   - strict I/O  - daily weights  - Thoracic following   - ctyopathology pending   - 800mL fluid restriction     Atrial Fibrillation   - New onset afib  - CHADSVASC 5   - rates controlled  - continue telemetry   - start toprol xL 25mg daily   - Ct heparin infusion for AC  - eventual plan for DOAC

## 2020-08-07 NOTE — CHART NOTE - NSCHARTNOTEFT_GEN_A_CORE
Notified by nurse that pleuravac connection to chest tube was severed during transport after getting stuck in wheel of bed. Chest tube was immediately clamped and a new pleuravac was connected by nurse in a sterile fashion. Urgent CXR ordered which appears unchanged from previous. Patient lying comfortably bed with saturation of 97% on room air. Will monitor.

## 2020-08-07 NOTE — PROGRESS NOTE ADULT - SUBJECTIVE AND OBJECTIVE BOX
CONNIE ACOSTA    [id number]    87y      Male    Patient is a 87y old  Male who presents with a chief complaint of SOB, CP,m possible ACS , CHF (07 Aug 2020 13:35)      INTERVAL HPI/OVERNIGHT EVENTS:    patient has some pain at the site of chest tube, he feels some improvement of SOB, denies fever, chills, chest pain   REVIEW OF SYSTEMS:    CONSTITUTIONAL: No fever, has  fatigue  RESPIRATORY: No cough, improving shortness of breath  CARDIOVASCULAR: No chest pain, palpitations  GASTROINTESTINAL: No abdominal, No nausea, vomiting  NEUROLOGICAL: No headaches,  loss of strength.  MISCELLANEOUS: No joint swelling or pain       Vital Signs Last 24 Hrs  T(C): 36.6 (07 Aug 2020 07:29), Max: 36.9 (06 Aug 2020 23:50)  T(F): 97.8 (07 Aug 2020 07:29), Max: 98.5 (06 Aug 2020 23:50)  HR: 85 (07 Aug 2020 08:19) (61 - 94)  BP: 100/53 (07 Aug 2020 07:29) (100/53 - 117/74)  RR: 18 (07 Aug 2020 07:29) (16 - 18)  SpO2: 94% (07 Aug 2020 07:29) (94% - 98%)    PHYSICAL EXAM:    GENERAL: Elderly male looking comfortable   HEENT: PERRL, +EOMI  NECK: soft, Supple, No JVD,   CHEST/LUNG: Decrease air entry bilaterally; No wheezing, right sided chest tube in  HEART: S1S2+, Regular rate and rhythm; some murmurs  ABDOMEN: Soft, Nontender, Nondistended; Bowel sounds present  EXTREMITIES:  1+ Peripheral Pulses, some edema  SKIN: No rashes or lesions  NEURO: AAOX3, no focal deficits, no motor r sensory loss  PSYCH: normal mood      LABS:                        9.9    6.59  )-----------( 193      ( 07 Aug 2020 07:08 )             35.7     08-07    138  |  98  |  34.0<H>  ----------------------------<  150<H>  4.9   |  31.0<H>  |  1.29    Ca    8.0<L>      07 Aug 2020 07:08  Mg     2.0     08-07      PT/INR - ( 07 Aug 2020 07:08 )   PT: 13.5 sec;   INR: 1.17 ratio         PTT - ( 07 Aug 2020 07:08 )  PTT:79.5 sec        I&O's Summary    06 Aug 2020 07:01  -  07 Aug 2020 07:00  --------------------------------------------------------  IN: 815.5 mL / OUT: 1280 mL / NET: -464.5 mL        MEDICATIONS  (STANDING):  aspirin  chewable 81 milliGRAM(s) Oral daily  atorvastatin 20 milliGRAM(s) Oral at bedtime  collagenase Ointment 1 Application(s) Topical daily  Dakins Solution - 1/2 Strength 1 Application(s) Topical daily  heparin  Infusion. 900 Unit(s)/Hr (9 mL/Hr) IV Continuous <Continuous>  latanoprost 0.005% Ophthalmic Solution 1 Drop(s) Both EYES at bedtime  lidocaine 1% Injectable 10 milliLiter(s) Local Injection once  metoprolol succinate ER 25 milliGRAM(s) Oral daily  pantoprazole    Tablet 40 milliGRAM(s) Oral before breakfast  povidone iodine 10% Solution 1 Application(s) Topical two times a day  torsemide 10 milliGRAM(s) Oral daily    MEDICATIONS  (PRN):  acetaminophen   Tablet .. 650 milliGRAM(s) Oral every 6 hours PRN Temp greater or equal to 38C (100.4F), Mild Pain (1 - 3), Moderate Pain (4 - 6)  ALPRAZolam 0.25 milliGRAM(s) Oral daily PRN anxiety  heparin   Injectable 4500 Unit(s) IV Push every 6 hours PRN For aPTT less than 40  heparin   Injectable 2000 Unit(s) IV Push every 6 hours PRN For aPTT between 40 - 57

## 2020-08-07 NOTE — PROGRESS NOTE ADULT - SUBJECTIVE AND OBJECTIVE BOX
Patient is a 87y old  Male who presents with a chief complaint of SOB, CP,m possible ACS , CHF (06 Aug 2020 15:47)      HPI:  87y old  Male with PMH/ HFpEF, CAD, HTN, HLD, DM type 2 on oral hypoglycemics, CKD stage III; rib fractures complicated by L-sided pleural effusion, requiring thoracentesis ~2 years ago; B/L pleural effusions, s/p L thoracentesis- 01/02/20, transudative, chronic lightheadedness, GERD, who presents with a chief complaint of MEJÍA and gait instability. Pt states that since about 2 mos ago he has been feeling short of breath, more with exertion -- walking around his apartment or with bending to put his shoes on. Pt states that today his visiting nurse became concerned about his breathing, him getting "winded" with minimal exertion and abnormal lung sounds. Pt states that he has been feeling the same since about 2 mos ago. Pt states that sometimes he feels chest burning if he eats late or he eats heavy meal, especially meat. pt states that he lost about 10 lbs since about 2 mos ago due to has been taking Abx for L heal ulcer and his appetite has been poor. Pt states that his doctor discontinued BP medications 2/2 low BP. Pt states that he has been drinking 0.5 L bottles of water-- about 2-3 every day. Pt denies any CP/dizziness/diaphoresis/F/C/cough/abdominal pain/sick contacts/other complaints.   Above HPI per cardio  read and agreed with verified with patient -In Addition , he was started on Zinc per his PCP 2 weeks ago when he visited for SOB , he denied any edema or orthopnea , denied fever or cough or sick contact   States he is anxious about the cardiac cath and i explained the cath procedure , discussed that he might need thoracentesis with bilateral pleural effusions.    History as above on admission. Podiatry consulted for Left heel ulcer. Pt states he has been following up with Dr. Erazo as outpatient and has been getting weekly debridements. States he had a bone biopsy done last week, was supposed to get the results this week but missed his appt due to being admitted. Denies f/c/n/v.     PAST MEDICAL & SURGICAL HISTORY:  DM (diabetes mellitus)  HLD (hyperlipidemia)  HTN (hypertension)  PVCs (premature ventricular contractions)  CHF (congestive heart failure)  H/O right inguinal hernia repair    Allergies: No Known Allergies    Medications: acetaminophen   Tablet .. 650 milliGRAM(s) Oral every 6 hours PRN  ALPRAZolam 0.25 milliGRAM(s) Oral daily PRN  aspirin  chewable 81 milliGRAM(s) Oral daily  atorvastatin 20 milliGRAM(s) Oral at bedtime  heparin   Injectable 4500 Unit(s) IV Push every 6 hours PRN  heparin   Injectable 2000 Unit(s) IV Push every 6 hours PRN  heparin  Infusion.  Unit(s)/Hr IV Continuous <Continuous>  lidocaine 1% Injectable 10 milliLiter(s) Local Injection once  metoprolol succinate ER 25 milliGRAM(s) Oral daily  pantoprazole    Tablet 40 milliGRAM(s) Oral before breakfast  povidone iodine 10% Solution 1 Application(s) Topical two times a day  torsemide 10 milliGRAM(s) Oral daily    FH:FAMILY HISTORY:  FH: CAD (coronary artery disease): Mother    SH:     Vital Signs Last 24 Hrs  T(C): 36.6 (07 Aug 2020 07:29), Max: 36.9 (06 Aug 2020 23:50)  T(F): 97.8 (07 Aug 2020 07:29), Max: 98.5 (06 Aug 2020 23:50)  HR: 85 (07 Aug 2020 08:19) (61 - 94)  BP: 100/53 (07 Aug 2020 07:29) (90/55 - 117/74)  BP(mean): --  RR: 18 (07 Aug 2020 07:29) (16 - 18)  SpO2: 94% (07 Aug 2020 07:29) (94% - 98%)    LABS                        9.9    6.59  )-----------( 193      ( 07 Aug 2020 07:08 )             35.7   08-07    138  |  98  |  34.0<H>  ----------------------------<  150<H>  4.9   |  31.0<H>  |  1.29    Ca    8.0<L>      07 Aug 2020 07:08  Mg     2.0     08-07      ROS  REVIEW OF SYSTEMS:  CONSTITUTIONAL: No fever, weight loss, or fatigue  RESPIRATORY: No cough, wheezing, chills or hemoptysis; No shortness of breath  CARDIOVASCULAR: No chest pain, palpitations, dizziness, or leg swelling  SKIN: Left heel ulcer    PHYSICAL EXAM  LE Focused:    Vasc:  DP/PT pulses lightly palpable, cap refill <5 secs  Derm: Left lateral heel ulcer measuring ~3.0 cm x 1.0 cm x 1.0 cm with fibrogranular base and necrotic borders. No drainage noted, probes deep but not to bone.   Neuro: Light touch sensation grossly intact  MSK: Pain on palpation to Left heel ulcer.     Imaging: Xrays ordered    Cultures: taken    A: Left heel ulcer    P:  Patient evaluated, chart reviewed  Discussed diagnosis and treatment plan with patient  Xrays ordered - pending  ABIS ordered - pending  Applied Santyl/DSD  Wound culture taken - pending results  Recommend offloading to LLE while in bed  Podiatry to follow while in house  Seen with Dr. Patiño

## 2020-08-07 NOTE — PROGRESS NOTE ADULT - ASSESSMENT
87y old  Male with PMH/ HFpEF, CAD, HTN, HLD, DM type 2 on oral hypoglycemics, CKD stage III; rib fractures complicated by L-sided pleural effusion requiring thoracentesis ~2 years ago; B/L pleural effusions, s/p L thoracentesis- 01/02/20, transudative, chronic lightheadedness, GERD, who presents with dyspnea and CHF.    Dyspnea   - multifactorial - Bilateral pleural effusions , acute on chronic systolic and diastolic CHF, Anemia   - improved s/p R chest tube. Spoke to IR, loculated L sided effusions, will defer L thoracocentesis at this time and monitor, R pleural effusion.  780cc drained in 24h.  - Lasix BID changed to Torsemide   - effusion cytology negative to date  - thoracic surgery following, will remove drain when output minimal, daily cxr   TTE done showed Left ventricular ejection fraction, by visual estimation, is 45 to 50%, Mildly decreased global left ventricular systolic function, Spectral Doppler shows pseudonormal pattern of left ventricular myocardial filling (Grade II diastolic dysfunction), Large pleural effusion in both left and right lateral regions, Estimated pulmonary artery systolic pressure is 55.3 mmHg assuming a right atrial pressure of 8 mmHg, which is consistent with moderate pulmonary hypertension.          CHF with negative troponin and elevated BNP   - s/p Echo, EF 45-50%  - continue Lasix BID  - holding ACEI given soft BPs  - s/p cardiac cath, non-obstructive CAD    Afib  - new onset  - started metoprolol succinate 25mg QD as per cards  - started heparin for AC as per cards, plan to transition to DOAC on d/c,   TSH is 6.42, will recheck, patient is s/p cath showed Overall non-obstructive CAD. as per them Would maximize  medical therapy for diastolic CHF and CAD.      L heel ulcer  - pt followed outpatient by Dr Erazo, who recommended betadine BID  - pt due for outpatient bone culture as per Dr Erazo, consulted Dr. Patiño podiatry who will visit in-house    Hypomagnesemia  - repleted, goal >2.0, will monitor     T2DM  - HbA1c 6.3% , goal <8.0%  - d/c sliding scale and monitor with daily labs  - DASH diet    Weight loss and early satiety  - could be CHF related but may need GI workup. Will monitor post-cath  - nutrition consulted    CAD/HLD  - non-obstructive CAD as per cardiac cath, cont statin and aspirin , per Cardio  - positive calcification on CT Lcx and LAD    Anemia -  looks like anemia of chronic disease, no bleeding, H7H has been stable   - Stool Occult blood  is pending    GERD- cont PPI    CKD  - stable   - will monitor renal function post-Cath- fluids if needed     Anxiety low dose xanax PRN for cath or procedures    DVT PPX- heparin     Dispo: Next week, when Chest tube is out, PT is following

## 2020-08-08 LAB
-  AMPICILLIN/SULBACTAM: SIGNIFICANT CHANGE UP
-  AMPICILLIN: SIGNIFICANT CHANGE UP
-  CEFAZOLIN: SIGNIFICANT CHANGE UP
-  CLINDAMYCIN: SIGNIFICANT CHANGE UP
-  DAPTOMYCIN: SIGNIFICANT CHANGE UP
-  ERYTHROMYCIN: SIGNIFICANT CHANGE UP
-  GENTAMICIN: SIGNIFICANT CHANGE UP
-  LINEZOLID: SIGNIFICANT CHANGE UP
-  OXACILLIN: SIGNIFICANT CHANGE UP
-  PENICILLIN: SIGNIFICANT CHANGE UP
-  RIFAMPIN: SIGNIFICANT CHANGE UP
-  TETRACYCLINE: SIGNIFICANT CHANGE UP
-  TETRACYCLINE: SIGNIFICANT CHANGE UP
-  TRIMETHOPRIM/SULFAMETHOXAZOLE: SIGNIFICANT CHANGE UP
-  VANCOMYCIN: SIGNIFICANT CHANGE UP
-  VANCOMYCIN: SIGNIFICANT CHANGE UP
ANION GAP SERPL CALC-SCNC: 12 MMOL/L — SIGNIFICANT CHANGE UP (ref 5–17)
APTT BLD: 55.7 SEC — HIGH (ref 27.5–35.5)
APTT BLD: 67.8 SEC — HIGH (ref 27.5–35.5)
APTT BLD: 79.9 SEC — HIGH (ref 27.5–35.5)
BUN SERPL-MCNC: 33 MG/DL — HIGH (ref 8–20)
CALCIUM SERPL-MCNC: 8.3 MG/DL — LOW (ref 8.6–10.2)
CHLORIDE SERPL-SCNC: 96 MMOL/L — LOW (ref 98–107)
CO2 SERPL-SCNC: 27 MMOL/L — SIGNIFICANT CHANGE UP (ref 22–29)
CREAT SERPL-MCNC: 1.42 MG/DL — HIGH (ref 0.5–1.3)
FERRITIN SERPL-MCNC: 1384 NG/ML — HIGH (ref 30–400)
FOLATE SERPL-MCNC: 14.9 NG/ML — SIGNIFICANT CHANGE UP
GLUCOSE BLDC GLUCOMTR-MCNC: 158 MG/DL — HIGH (ref 70–99)
GLUCOSE BLDC GLUCOMTR-MCNC: 190 MG/DL — HIGH (ref 70–99)
GLUCOSE BLDC GLUCOMTR-MCNC: 202 MG/DL — HIGH (ref 70–99)
GLUCOSE BLDC GLUCOMTR-MCNC: 217 MG/DL — HIGH (ref 70–99)
GLUCOSE SERPL-MCNC: 163 MG/DL — HIGH (ref 70–99)
HCT VFR BLD CALC: 35.1 % — LOW (ref 39–50)
HGB BLD-MCNC: 9.9 G/DL — LOW (ref 13–17)
INR BLD: 1.17 RATIO — HIGH (ref 0.88–1.16)
IRON SATN MFR SERPL: 14 % — LOW (ref 16–55)
IRON SATN MFR SERPL: 31 UG/DL — LOW (ref 59–158)
IRON SATN MFR SERPL: 31 UG/DL — LOW (ref 59–158)
LDH SERPL L TO P-CCNC: 154 U/L — SIGNIFICANT CHANGE UP (ref 98–192)
MCHC RBC-ENTMCNC: 24.3 PG — LOW (ref 27–34)
MCHC RBC-ENTMCNC: 28.2 GM/DL — LOW (ref 32–36)
MCV RBC AUTO: 86.2 FL — SIGNIFICANT CHANGE UP (ref 80–100)
METHOD TYPE: SIGNIFICANT CHANGE UP
METHOD TYPE: SIGNIFICANT CHANGE UP
PLATELET # BLD AUTO: 202 K/UL — SIGNIFICANT CHANGE UP (ref 150–400)
POTASSIUM SERPL-MCNC: 4.5 MMOL/L — SIGNIFICANT CHANGE UP (ref 3.5–5.3)
POTASSIUM SERPL-SCNC: 4.5 MMOL/L — SIGNIFICANT CHANGE UP (ref 3.5–5.3)
PROTHROM AB SERPL-ACNC: 13.5 SEC — SIGNIFICANT CHANGE UP (ref 10.6–13.6)
RBC # BLD: 4.07 M/UL — LOW (ref 4.2–5.8)
RBC # BLD: 4.18 M/UL — LOW (ref 4.2–5.8)
RBC # FLD: 23.8 % — HIGH (ref 10.3–14.5)
RETICS #: 48.9 K/UL — SIGNIFICANT CHANGE UP (ref 25–125)
RETICS/RBC NFR: 1.2 % — SIGNIFICANT CHANGE UP (ref 0.5–2.5)
SODIUM SERPL-SCNC: 135 MMOL/L — SIGNIFICANT CHANGE UP (ref 135–145)
TIBC SERPL-MCNC: 217 UG/DL — LOW (ref 220–430)
TRANSFERRIN SERPL-MCNC: 152 MG/DL — LOW (ref 180–329)
VIT B12 SERPL-MCNC: 545 PG/ML — SIGNIFICANT CHANGE UP (ref 232–1245)
WBC # BLD: 7.3 K/UL — SIGNIFICANT CHANGE UP (ref 3.8–10.5)
WBC # FLD AUTO: 7.3 K/UL — SIGNIFICANT CHANGE UP (ref 3.8–10.5)

## 2020-08-08 PROCEDURE — 99233 SBSQ HOSP IP/OBS HIGH 50: CPT

## 2020-08-08 PROCEDURE — 71045 X-RAY EXAM CHEST 1 VIEW: CPT | Mod: 26

## 2020-08-08 PROCEDURE — 99231 SBSQ HOSP IP/OBS SF/LOW 25: CPT

## 2020-08-08 PROCEDURE — 99232 SBSQ HOSP IP/OBS MODERATE 35: CPT

## 2020-08-08 RX ORDER — ASCORBIC ACID 60 MG
500 TABLET,CHEWABLE ORAL DAILY
Refills: 0 | Status: DISCONTINUED | OUTPATIENT
Start: 2020-08-08 | End: 2020-08-12

## 2020-08-08 RX ADMIN — HEPARIN SODIUM 1000 UNIT(S)/HR: 5000 INJECTION INTRAVENOUS; SUBCUTANEOUS at 15:44

## 2020-08-08 RX ADMIN — HEPARIN SODIUM 1000 UNIT(S)/HR: 5000 INJECTION INTRAVENOUS; SUBCUTANEOUS at 23:43

## 2020-08-08 RX ADMIN — Medication 0.25 MILLIGRAM(S): at 22:32

## 2020-08-08 RX ADMIN — ATORVASTATIN CALCIUM 20 MILLIGRAM(S): 80 TABLET, FILM COATED ORAL at 22:30

## 2020-08-08 RX ADMIN — Medication 1: at 09:25

## 2020-08-08 RX ADMIN — Medication 1 APPLICATION(S): at 09:27

## 2020-08-08 RX ADMIN — PANTOPRAZOLE SODIUM 40 MILLIGRAM(S): 20 TABLET, DELAYED RELEASE ORAL at 05:49

## 2020-08-08 RX ADMIN — Medication 10 MILLIGRAM(S): at 05:49

## 2020-08-08 RX ADMIN — Medication 1 APPLICATION(S): at 09:26

## 2020-08-08 RX ADMIN — Medication 1 APPLICATION(S): at 05:49

## 2020-08-08 RX ADMIN — HEPARIN SODIUM 1000 UNIT(S)/HR: 5000 INJECTION INTRAVENOUS; SUBCUTANEOUS at 07:36

## 2020-08-08 RX ADMIN — Medication 1: at 12:37

## 2020-08-08 RX ADMIN — Medication 1 APPLICATION(S): at 17:08

## 2020-08-08 RX ADMIN — Medication 81 MILLIGRAM(S): at 09:25

## 2020-08-08 RX ADMIN — Medication 25 MILLIGRAM(S): at 05:49

## 2020-08-08 RX ADMIN — Medication 2: at 17:07

## 2020-08-08 RX ADMIN — HEPARIN SODIUM 2000 UNIT(S): 5000 INJECTION INTRAVENOUS; SUBCUTANEOUS at 07:38

## 2020-08-08 RX ADMIN — LATANOPROST 1 DROP(S): 0.05 SOLUTION/ DROPS OPHTHALMIC; TOPICAL at 22:30

## 2020-08-08 NOTE — DIETITIAN INITIAL EVALUATION ADULT. - CONTINUE CURRENT NUTRITION CARE PLAN
-- continue liberalized diet to optimize po intake (monitor blood sugars and correct as needed); add Glucerna TID to optimize po intake and provide an additional 220 kcal, 10g protein per serving./yes

## 2020-08-08 NOTE — PROGRESS NOTE ADULT - SUBJECTIVE AND OBJECTIVE BOX
[facility]                                                        Office: [address] [address]                                                       Telephone: [phone]. Fax:[phone]                                                                             PROGRESS NOTE   Reason for follow up:  SOB, CP,m possible ACS , CHF                            Overnight: No new events.   Update:   Sitting in bed, comfortable stating pain has decreased.  40ml draining serosanganious into chest tube since the am.   135 in 24 hour daily, which has decreased as well.    Subjective: "I feel good"   Complains of:  Nothing  Review of symptoms: Cardiac:  No chest pain. No dyspnea. No palpitations.  Respiratory: no cough. No dyspnea  Gastrointestinal: No diarrhea. No abdominal pain. No bleeding.     Past medical history: No updates.   Chronic conditions:  PMH/ HFpEF, CAD, HTN, HLD, DM type 2 on oral hypoglycemics, CKD stage III; rib fractures complicated by L-sided pleural effusion, requiring thoracentesis ~2 years ago; B/L pleural effusions, s/p L thoracentesis- 01/02/20, transudative, chronic lightheadedness, GERD,     Vitals:  T(C): 36.7 (08-08-20 @ 09:25), Max: 36.9 (08-08-20 @ 05:40)  HR: 67 (08-08-20 @ 13:25) (67 - 95)  BP: 122/78 (08-08-20 @ 13:25) (103/65 - 125/82)  RR: 18 (08-08-20 @ 09:25) (17 - 19)  SpO2: 96% (08-08-20 @ 09:25) (95% - 100%)  I&O's Summary  07 Aug 2020 07:01  -  08 Aug 2020 07:00  --------------------------------------------------------  IN: 1227 mL / OUT: 485 mL / NET: 742 mL  08 Aug 2020 07:01  -  08 Aug 2020 15:27  --------------------------------------------------------  IN: 70 mL / OUT: 0 mL / NET: 70 mL    Weight (kg): 59 (08-06 @ 11:35)    PHYSICAL EXAM:  Appearance: Comfortable. No acute distress, thin cachetic male.    HEENT:  Head and neck: Atraumatic. Normocephalic.  Normal oral mucosa, PERRL, Neck is supple. No JVD, No carotid bruit.   Neurologic: A & O x 3, no focal deficits. EOMI , Cranial nerves are intact.  Lymphatic: No cervical lymphadenopathy  Cardiovascular: Irregular,  S1 S2, No murmur, rubs/gallops. No JVD, No edema  Respiratory: Lungs clear to auscultation, diminished bilaterally bases.  Chest tube dry flank area on right dry and intact   Gastrointestinal:  Soft, Non-tender, + BS  Lower Extremities: No edema  Psychiatry: Patient is calm. No agitation. Mood & affect appropriate  Skin: No rashes/ ecchymoses/cyanosis/ulcers visualized on the face, hands or feet.    CURRENT MEDICATIONS:  metoprolol succinate ER 25 milliGRAM(s) Oral daily  torsemide 10 milliGRAM(s) Oral daily  pantoprazole    Tablet  atorvastatin  insulin lispro (HumaLOG) corrective regimen sliding scale  insulin lispro (HumaLOG) corrective regimen sliding scale  ascorbic acid  aspirin  chewable  collagenase Ointment  Dakins Solution - 1/2 Strength  dextrose 5%.  heparin  Infusion.  latanoprost 0.005% Ophthalmic Solution  multivitamin  povidone iodine 10% Solution    LABS:	 	                       9.9    7.30  )-----------( 202      ( 08 Aug 2020 06:01 )            35.1   135  |  96<L>  |  33.0<H>  ----------------------------<  163<H>  4.5   |  27.0  |  1.42<H>  Ca    8.3<L>      08 Aug 2020 06:01  Mg     2.0     08-07  proBNP: Serum Pro-Brain Natriuretic Peptide: 97054 pg/mL (08-03 @ 20:46)  Lipid Profile: Date: 08-04 @ 07:49  Total cholesterol 73; Direct LDL: 28; HDL: 25; Triglycerides:101  HgA1c: TSH: Thyroid Stimulating Hormone, Serum: 6.57 uIU/mL    TELEMETRY: Reviewed    	    CXR  INTERPRETATION:  TECHNIQUE: Single portable view of the chest.  COMPARISON: 8/7/2020  CLINICAL HISTORY: Abnormal Chest Soundsct in place  FINDINGS:  Single frontal view of the chest demonstrates right lower lobe pigtail catheter. Small left lower lobe effusion/infiltrate, unchanged. The cardiomediastinal silhouette is normal. No acute osseous abnormalities. Overlying EKG leads and wires are noted  IMPRESSION: No interval change.

## 2020-08-08 NOTE — DIETITIAN INITIAL EVALUATION ADULT. - MALNUTRITION
NFPE: severe muscle loss of temples, clavicles, shoulders; severe fat loss of orbitals, buccal pads, ribs, triceps severe, acute on chronic

## 2020-08-08 NOTE — CHART NOTE - NSCHARTNOTEFT_GEN_A_CORE
Upon Nutritional Assessment by the Registered Dietitian your patient was determined to meet criteria / has evidence of the following diagnosis/diagnoses:          [ ]  Mild Protein Calorie Malnutrition        [ ]  Moderate Protein Calorie Malnutrition        [ x] Severe Protein Calorie Malnutrition        [ ] Unspecified Protein Calorie Malnutrition        [ ] Underweight / BMI <19        [ ] Morbid Obesity / BMI > 40    Pt presents at high nutrition risk secondary to malnutrition (severe, acute on chronic) related to inability to meet sufficient protein-energy in setting of advanced age, CHF, recurrent pleural effusions, left heel ulcer, and persistent lack of appetite with early satiety as evidenced by meeting <75% nutrient needs >1 month, severe muscle loss of temples, clavicles, shoulders; severe fat loss of orbitals, buccal pads, ribs, triceps, 7.1% wt loss x ~2 months.     Findings as based on:  •  Comprehensive nutrition assessment and consultation  •  Calorie counts (nutrient intake analysis)  •  Food acceptance and intake status from observations by staff  •  Follow up  •  Patient education  •  Intervention secondary to interdisciplinary rounds  •   concerns      Treatment:    The following has been recommended:  1) Continue liberalized diet to optimize po intake (monitor blood sugars and correct as needed).  2) Add Glucerna TID to optimize po intake and provide an additional 220 kcal, 10g protein per serving.  3) Rx: MVI and vit C 500mg daily.   4) Encourage po intake, monitor diet tolerance, and provide assistance at meals as needed.   5) Obtain daily weights to monitor trends.      PROVIDER Section:     By signing this assessment you are acknowledging and agree with the diagnosis/diagnoses assigned by the Registered Dietitian    Comments:

## 2020-08-08 NOTE — PROGRESS NOTE ADULT - ASSESSMENT
87y old  Male with PMH/ HFpEF, CAD, HTN, HLD, DM type 2 on oral hypoglycemics, CKD stage III; rib fractures complicated by L-sided pleural effusion requiring thoracentesis ~2 years ago; B/L pleural effusions, s/p L thoracentesis- 01/02/20, transudative, chronic lightheadedness, GERD, who presents with dyspnea and CHF.    Dyspnea likely multifactorial - Bilateral pleural effusions , acute on chronic systolic and diastolic CHF, Anemia   - improved s/p R chest tube. Spoke to IR, loculated L sided effusions, will defer L thoracocentesis at this time and monitor, R pleural effusion.  780cc drained in 24h.  - Lasix BID changed to Torsemide   -Transudate on fluid analysis  - effusion cytology negative for malignant cells  - thoracic surgery following, will remove drain when output minimal, daily cxr    Low output, Maintain chest tube to water seal today   Follow up daily output  Repeat CXR in AM while chest tube is in place  Plan for Pleurex on Monday  TTE done showed Left ventricular ejection fraction, by visual estimation, is 45 to 50%, Mildly decreased global left ventricular systolic function, Spectral Doppler shows pseudonormal pattern of left ventricular myocardial filling (Grade II diastolic dysfunction), Large pleural effusion in both left and right lateral regions, Estimated pulmonary artery systolic pressure is 55.3 mmHg assuming a right atrial pressure of 8 mmHg, which is consistent with moderate pulmonary hypertension.          Acute on chronic systolic & diastolic CHF with negative troponin and elevated BNP   - s/p Echo, EF 45-50%  - Lasix BID changed to Torsemide   - holding ACEI given soft BPs  - s/p cardiac cath, non-obstructive CAD    New onset Afib  - CHADSVASC 5  - started metoprolol succinate 25mg QD as per cards  - started heparin for AC as per cards. Once catheter is placed, can dc home on eliquis 2.5 mg bid  TSH is 6.42, will recheck, patient is s/p cath showed Overall non-obstructive CAD. as per them Would maximize  medical therapy for diastolic CHF and CAD.      L heel ulcer  - pt followed outpatient by Dr Erazo, who recommended betadine BID  - pt due for outpatient bone culture as per Dr Erazo  Xrays ordered - pending  ABIS ordered - pending  Applied Santyl/DSD  Wound culture taken - pending results  Recommend offloading to LLE while in bed  Podiatry following    Hypomagnesemia  - repleted, goal >2.0, will monitor     T2DM  - HbA1c 6.3% , goal <8.0%  - d/c sliding scale and monitor with daily labs  - DASH diet    Weight loss and early satiety  - could be CHF related but may need GI workup. Will monitor post-cath  - nutrition consulted    CAD/HLD  - non-obstructive CAD as per cardiac cath, cont statin and aspirin , per Cardio  - positive calcification on CT Lcx and LAD    Anemia -  looks like anemia of chronic disease, no bleeding, H7H has been stable   - Stool Occult blood  is pending    GERD- cont PPI    CKD  - stable   - will monitor renal function post-Cath- fluids if needed     Anxiety low dose xanax PRN for cath or procedures      Severe Protein Calorie Malnutrition  Nutrition eval called  Add Glucerna TID   MVI and vit C 500mg daily.     DVT PPX- heparin     Dispo: Next week, when Chest tube is out & pleurex done 87y old  Male with PMH/ HFpEF, CAD, HTN, HLD, DM type 2 on oral hypoglycemics, CKD stage III; rib fractures complicated by L-sided pleural effusion requiring thoracentesis ~2 years ago; B/L pleural effusions, s/p L thoracentesis- 01/02/20, transudative, chronic lightheadedness, GERD, who presents with dyspnea and CHF.    Dyspnea likely multifactorial - Bilateral pleural effusions , acute on chronic systolic and diastolic CHF, Anemia   - improved s/p R chest tube. Spoke to IR, loculated L sided effusions, will defer L thoracocentesis at this time and monitor, R pleural effusion.  780cc drained in 24h.  - Lasix BID changed to Torsemide   -Transudate on fluid analysis  - effusion cytology negative for malignant cells  - thoracic surgery following, will remove drain when output minimal, daily cxr    Low output, Maintain chest tube to water seal today   Follow up daily output  Repeat CXR in AM while chest tube is in place  Plan for Pleurex on Monday  TTE done showed Left ventricular ejection fraction, by visual estimation, is 45 to 50%, Mildly decreased global left ventricular systolic function, Spectral Doppler shows pseudonormal pattern of left ventricular myocardial filling (Grade II diastolic dysfunction), Large pleural effusion in both left and right lateral regions, Estimated pulmonary artery systolic pressure is 55.3 mmHg assuming a right atrial pressure of 8 mmHg, which is consistent with moderate pulmonary hypertension.          Acute on chronic systolic & diastolic CHF with negative troponin and elevated BNP   - s/p Echo, EF 45-50%  - Lasix BID changed to Torsemide   - holding ACEI given soft BPs  - s/p cardiac cath, non-obstructive CAD    New onset Afib  - CHADSVASC 5  - started metoprolol succinate 25mg QD as per cards  - started heparin for AC as per cards. Once catheter is placed, can dc home on eliquis 2.5 mg bid  TSH is 6.42, will recheck, patient is s/p cath showed Overall non-obstructive CAD. as per them Would maximize  medical therapy for diastolic CHF and CAD.      L heel ulcer  - pt followed outpatient by Dr Erazo, who recommended betadine BID  - pt due for outpatient bone culture as per Dr Erazo  Xrays ordered - pending  ABIS ordered - pending  Applied Santyl/DSD  Wound culture taken - pending results  Recommend offloading to LLE while in bed  Podiatry following    Hypomagnesemia  - repleted, goal >2.0, will monitor     T2DM  - HbA1c 6.3% , goal <8.0%  - d/c sliding scale and monitor with daily labs  - DASH diet    Weight loss and early satiety  - could be CHF related but may need GI workup. Will monitor post-cath  - nutrition consulted    CAD/HLD  - non-obstructive CAD as per cardiac cath, cont statin and aspirin , per Cardio  - positive calcification on CT Lcx and LAD    Anemia -  looks like anemia of chronic disease, no bleeding, H & H has been stable   - Stool Occult blood  is pending  Anemia w/u pending  No overt bleeding noted      GERD- cont PPI    CKD  - stable   - will monitor renal function post-Cath- fluids if needed     Anxiety low dose xanax PRN for cath or procedures      Severe Protein Calorie Malnutrition  Nutrition eval called  Add Glucerna TID   MVI and vit C 500mg daily.     DVT PPX- heparin     Dispo: Next week, when Chest tube is out & pleurex done

## 2020-08-08 NOTE — DIETITIAN INITIAL EVALUATION ADULT. - PERTINENT MEDS FT
MEDICATIONS  (STANDING):  aspirin  chewable 81 milliGRAM(s) Oral daily  atorvastatin 20 milliGRAM(s) Oral at bedtime  collagenase Ointment 1 Application(s) Topical daily  Dakins Solution - 1/2 Strength 1 Application(s) Topical daily  dextrose 5%. 1000 milliLiter(s) (50 mL/Hr) IV Continuous <Continuous>  dextrose 50% Injectable 12.5 Gram(s) IV Push once  dextrose 50% Injectable 25 Gram(s) IV Push once  dextrose 50% Injectable 25 Gram(s) IV Push once  heparin  Infusion. 900 Unit(s)/Hr (9 mL/Hr) IV Continuous <Continuous>  insulin lispro (HumaLOG) corrective regimen sliding scale   SubCutaneous three times a day before meals  insulin lispro (HumaLOG) corrective regimen sliding scale   SubCutaneous at bedtime  latanoprost 0.005% Ophthalmic Solution 1 Drop(s) Both EYES at bedtime  lidocaine 1% Injectable 10 milliLiter(s) Local Injection once  metoprolol succinate ER 25 milliGRAM(s) Oral daily  pantoprazole    Tablet 40 milliGRAM(s) Oral before breakfast  povidone iodine 10% Solution 1 Application(s) Topical two times a day  torsemide 10 milliGRAM(s) Oral daily    MEDICATIONS  (PRN):  acetaminophen   Tablet .. 650 milliGRAM(s) Oral every 6 hours PRN Temp greater or equal to 38C (100.4F), Mild Pain (1 - 3), Moderate Pain (4 - 6)  ALPRAZolam 0.25 milliGRAM(s) Oral daily PRN anxiety  dextrose 40% Gel 15 Gram(s) Oral once PRN Blood Glucose LESS THAN 70 milliGRAM(s)/deciliter  glucagon  Injectable 1 milliGRAM(s) IntraMuscular once PRN Glucose LESS THAN 70 milligrams/deciliter  heparin   Injectable 4500 Unit(s) IV Push every 6 hours PRN For aPTT less than 40  heparin   Injectable 2000 Unit(s) IV Push every 6 hours PRN For aPTT between 40 - 57

## 2020-08-08 NOTE — PROGRESS NOTE ADULT - SUBJECTIVE AND OBJECTIVE BOX
Subjective:    Vital Signs:  Vital Signs Last 24 Hrs  T(C): 36.7 (08-08-20 @ 09:25), Max: 36.9 (08-08-20 @ 05:40)  T(F): 98 (08-08-20 @ 09:25), Max: 98.5 (08-08-20 @ 05:40)  HR: 88 (08-08-20 @ 09:25) (68 - 95)  BP: 118/74 (08-08-20 @ 09:25) (103/65 - 125/82)  RR: 18 (08-08-20 @ 09:25) (17 - 19)  SpO2: 96% (08-08-20 @ 09:25) (95% - 100%) on (O2)    Relevant labs, radiology and Medications reviewed    Pertinent Physical Exam      08-07 @ 07:01  -  08-08 @ 07:00  --------------------------------------------------------  IN:    heparin  Infusion.: 207 mL    Oral Fluid: 1020 mL  Total IN: 1227 mL    OUT:    Chest Tube: 135 mL    Voided: 350 mL  Total OUT: 485 mL    Total NET: 742 mL      08-08 @ 07:01  -  08-08 @ 12:05  --------------------------------------------------------  IN:    heparin  Infusion.: 50 mL  Total IN: 50 mL    OUT:  Total OUT: 0 mL    Total NET: 50 mL Subjective: Resting comfortably in bed.  Patient denies acute pain with radiating or aggravating factors.  He denies chest pain, shortness of breath, palpitations, headache, dizziness, nausea, or vomiting.     Vital Signs:  Vital Signs Last 24 Hrs  T(C): 36.7 (08-08-20 @ 09:25), Max: 36.9 (08-08-20 @ 05:40)  T(F): 98 (08-08-20 @ 09:25), Max: 98.5 (08-08-20 @ 05:40)  HR: 88 (08-08-20 @ 09:25) (68 - 95)  BP: 118/74 (08-08-20 @ 09:25) (103/65 - 125/82)  RR: 18 (08-08-20 @ 09:25) (17 - 19)  SpO2: 96% (08-08-20 @ 09:25) (95% - 100%) on (O2)    Relevant labs, radiology and Medications reviewed    Pertinent Physical Exam  General: Well appearing, NAD  Neuro: AxO x3, non-focal, SAPP  Cardiac: S1S2, no murmurs  Pulm: CTA b/l, no wheezing or rales  Abdomen: Soft, NT, ND, hypoactive BS  Peripheral: +DP pulses b/l, no peripheral edema     08-07 @ 07:01  -  08-08 @ 07:00  --------------------------------------------------------  IN:    heparin  Infusion.: 207 mL    Oral Fluid: 1020 mL  Total IN: 1227 mL    OUT:    Chest Tube: 135 mL    Voided: 350 mL  Total OUT: 485 mL    Total NET: 742 mL      08-08 @ 07:01  -  08-08 @ 12:05  --------------------------------------------------------  IN:    heparin  Infusion.: 50 mL  Total IN: 50 mL    OUT:  Total OUT: 0 mL    Total NET: 50 mL

## 2020-08-08 NOTE — PROGRESS NOTE ADULT - SUBJECTIVE AND OBJECTIVE BOX
Podiatry interval HPI: Pt was seen by podiatry this afternoon. Pt refused debridement st bedside since its painful. Pt denies any other foot related complaint, states that he follows at wound care centre weekly and he is minimally ambulatory due to heel pain.    Patient is a 87y old  Male who presents with a chief complaint of SOB, CP,m possible ACS , CHF (06 Aug 2020 15:47)      HPI:  87y old  Male with PMH/ HFpEF, CAD, HTN, HLD, DM type 2 on oral hypoglycemics, CKD stage III; rib fractures complicated by L-sided pleural effusion, requiring thoracentesis ~2 years ago; B/L pleural effusions, s/p L thoracentesis- 01/02/20, transudative, chronic lightheadedness, GERD, who presents with a chief complaint of MEJÍA and gait instability. Pt states that since about 2 mos ago he has been feeling short of breath, more with exertion -- walking around his apartment or with bending to put his shoes on. Pt states that today his visiting nurse became concerned about his breathing, him getting "winded" with minimal exertion and abnormal lung sounds. Pt states that he has been feeling the same since about 2 mos ago. Pt states that sometimes he feels chest burning if he eats late or he eats heavy meal, especially meat. pt states that he lost about 10 lbs since about 2 mos ago due to has been taking Abx for L heal ulcer and his appetite has been poor. Pt states that his doctor discontinued BP medications 2/2 low BP. Pt states that he has been drinking 0.5 L bottles of water-- about 2-3 every day. Pt denies any CP/dizziness/diaphoresis/F/C/cough/abdominal pain/sick contacts/other complaints.   Above HPI per cardio  read and agreed with verified with patient -In Addition , he was started on Zinc per his PCP 2 weeks ago when he visited for SOB , he denied any edema or orthopnea , denied fever or cough or sick contact   States he is anxious about the cardiac cath and i explained the cath procedure , discussed that he might need thoracentesis with bilateral pleural effusions.    History as above on admission. Podiatry consulted for Left heel ulcer. Pt states he has been following up with Dr. Erazo as outpatient and has been getting weekly debridements. States he had a bone biopsy done last week, was supposed to get the results this week but missed his appt due to being admitted. Denies f/c/n/v.     PAST MEDICAL & SURGICAL HISTORY:  DM (diabetes mellitus)  HLD (hyperlipidemia)  HTN (hypertension)  PVCs (premature ventricular contractions)  CHF (congestive heart failure)  H/O right inguinal hernia repair    Allergies: No Known Allergies    Medications: acetaminophen   Tablet .. 650 milliGRAM(s) Oral every 6 hours PRN  ALPRAZolam 0.25 milliGRAM(s) Oral daily PRN  aspirin  chewable 81 milliGRAM(s) Oral daily  atorvastatin 20 milliGRAM(s) Oral at bedtime  heparin   Injectable 4500 Unit(s) IV Push every 6 hours PRN  heparin   Injectable 2000 Unit(s) IV Push every 6 hours PRN  heparin  Infusion.  Unit(s)/Hr IV Continuous <Continuous>  lidocaine 1% Injectable 10 milliLiter(s) Local Injection once  metoprolol succinate ER 25 milliGRAM(s) Oral daily  pantoprazole    Tablet 40 milliGRAM(s) Oral before breakfast  povidone iodine 10% Solution 1 Application(s) Topical two times a day  torsemide 10 milliGRAM(s) Oral daily    FH:FAMILY HISTORY:  FH: CAD (coronary artery disease): Mother    Vital Signs Last 24 Hrs  T(C): 36.8 (08 Aug 2020 15:46), Max: 36.9 (08 Aug 2020 05:40)  T(F): 98.2 (08 Aug 2020 15:46), Max: 98.5 (08 Aug 2020 05:40)  HR: 95 (08 Aug 2020 15:46) (67 - 95)  BP: 118/67 (08 Aug 2020 15:46) (108/60 - 125/82)  BP(mean): --  RR: 19 (08 Aug 2020 15:46) (17 - 19)  SpO2: 95% (08 Aug 2020 15:46) (95% - 100%)      LABS                                     9.9    7.30  )-----------( 202      ( 08 Aug 2020 06:01 )             35.1   08-08    135  |  96<L>  |  33.0<H>  ----------------------------<  163<H>  4.5   |  27.0  |  1.42<H>    Ca    8.3<L>      08 Aug 2020 06:01  Mg     2.0     08-07          ROS  REVIEW OF SYSTEMS:  CONSTITUTIONAL: No fever, weight loss, or fatigue  RESPIRATORY: No cough, wheezing, chills or hemoptysis; No shortness of breath  CARDIOVASCULAR: No chest pain, palpitations, dizziness, or leg swelling  SKIN: Left  lateral heel ulcer    PHYSICAL EXAM  LE Focused:    Vasc:  DP/PT pulses lightly palpable, cap refill <5 secs  Derm: Left lateral heel ulcer measuring ~3.0 cm x 1.0 cm x 1.0 cm with fibrogranular base and necrotic borders. No drainage noted, probes deep but not to bone.   Neuro: Light touch sensation grossly intact  MSK: Pain on palpation to Left heel ulcer.         Cultures: taken    A: Left heel ulcer    P:  Patient evaluated, chart revie< from: Xray Foot AP + Lateral + Oblique, Left (08.07.20 @ 14:19) >     EXAM:  FOOT-LEFT                          PROCEDURE DATE:  08/07/2020          INTERPRETATION:  TECHNIQUE: Three views left foot    COMPARISON: None.    CLINICAL HISTORY: Left heel ulcer.    FINDINGS:    Frontal, lateral and oblique views of the left foot shows no evidence for acute fracture, subluxation or dislocation. Mild vascular calcifications. Small heel spurs. Mild degenerative changes of the toes and midfoot. Mild diffuse soft tissue swelling surrounding the forefoot. Mild soft tissue swelling surrounding the calcaneus. Consider CT or MRI as clinically warranted.    IMPRESSION:  Mild diffuse soft tissue swelling surrounding the forefoot and calcaneus. No evidence of osteomyelitis. MRI is more sensitive.              RADHA EASTON M.D., ATTENDING RADIOLOGIST  This document has been electronically signed. Aug  7 2020  6:25PM        < end of copied text >    Plan:  Discussed diagnosis and treatment plan with patient  Xrays reviewed: no OM  ABIS ordered - reviewed  Applied Santyl/DSD  Wound culture taken - pending results  Recommend offloading to LLE while in bed  Podiatry to follow while in house  Discussed with Dr. Patiño Podiatry interval HPI: Pt was seen by podiatry this afternoon. Pt refused debridement st bedside since its painful. Pt denies any other foot related complaint, states that he follows at wound care centre weekly and he is minimally ambulatory due to heel pain.    Patient is a 87y old  Male who presents with a chief complaint of SOB, CP,m possible ACS , CHF (06 Aug 2020 15:47)      HPI:  87y old  Male with PMH/ HFpEF, CAD, HTN, HLD, DM type 2 on oral hypoglycemics, CKD stage III; rib fractures complicated by L-sided pleural effusion, requiring thoracentesis ~2 years ago; B/L pleural effusions, s/p L thoracentesis- 01/02/20, transudative, chronic lightheadedness, GERD, who presents with a chief complaint of MEJÍA and gait instability. Pt states that since about 2 mos ago he has been feeling short of breath, more with exertion -- walking around his apartment or with bending to put his shoes on. Pt states that today his visiting nurse became concerned about his breathing, him getting "winded" with minimal exertion and abnormal lung sounds. Pt states that he has been feeling the same since about 2 mos ago. Pt states that sometimes he feels chest burning if he eats late or he eats heavy meal, especially meat. pt states that he lost about 10 lbs since about 2 mos ago due to has been taking Abx for L heal ulcer and his appetite has been poor. Pt states that his doctor discontinued BP medications 2/2 low BP. Pt states that he has been drinking 0.5 L bottles of water-- about 2-3 every day. Pt denies any CP/dizziness/diaphoresis/F/C/cough/abdominal pain/sick contacts/other complaints.   Above HPI per cardio  read and agreed with verified with patient -In [place] , he was started on Zinc per his PCP 2 weeks ago when he visited for SOB , he denied any edema or orthopnea , denied fever or cough or sick contact   States he is anxious about the cardiac cath and i explained the cath procedure , discussed that he might need thoracentesis with bilateral pleural effusions.    History as above on admission. Podiatry consulted for Left heel ulcer. Pt states he has been following up with Dr. Erazo as outpatient and has been getting weekly debridements. States he had a bone biopsy done last week, was supposed to get the results this week but missed his appt due to being admitted. Denies f/c/n/v.     PAST MEDICAL & SURGICAL HISTORY:  DM (diabetes mellitus)  HLD (hyperlipidemia)  HTN (hypertension)  PVCs (premature ventricular contractions)  CHF (congestive heart failure)  H/O right inguinal hernia repair    Allergies: No Known Allergies    Medications: acetaminophen   Tablet .. 650 milliGRAM(s) Oral every 6 hours PRN  ALPRAZolam 0.25 milliGRAM(s) Oral daily PRN  aspirin  chewable 81 milliGRAM(s) Oral daily  atorvastatin 20 milliGRAM(s) Oral at bedtime  heparin   Injectable 4500 Unit(s) IV Push every 6 hours PRN  heparin   Injectable 2000 Unit(s) IV Push every 6 hours PRN  heparin  Infusion.  Unit(s)/Hr IV Continuous <Continuous>  lidocaine 1% Injectable 10 milliLiter(s) Local Injection once  metoprolol succinate ER 25 milliGRAM(s) Oral daily  pantoprazole    Tablet 40 milliGRAM(s) Oral before breakfast  povidone iodine 10% Solution 1 Application(s) Topical two times a day  torsemide 10 milliGRAM(s) Oral daily    FH:FAMILY HISTORY:  FH: CAD (coronary artery disease): Mother    Vital Signs Last 24 Hrs  T(C): 36.8 (08 Aug 2020 15:46), Max: 36.9 (08 Aug 2020 05:40)  T(F): 98.2 (08 Aug 2020 15:46), Max: 98.5 (08 Aug 2020 05:40)  HR: 95 (08 Aug 2020 15:46) (67 - 95)  BP: 118/67 (08 Aug 2020 15:46) (108/60 - 125/82)  BP(mean): --  RR: 19 (08 Aug 2020 15:46) (17 - 19)  SpO2: 95% (08 Aug 2020 15:46) (95% - 100%)      LABS                                     9.9    7.30  )-----------( 202      ( 08 Aug 2020 06:01 )             35.1   08-08    135  |  96<L>  |  33.0<H>  ----------------------------<  163<H>  4.5   |  27.0  |  1.42<H>    Ca    8.3<L>      08 Aug 2020 06:01  Mg     2.0     08-07          ROS  REVIEW OF SYSTEMS:  CONSTITUTIONAL: No fever, weight loss, or fatigue  RESPIRATORY: No cough, wheezing, chills or hemoptysis; No shortness of breath  CARDIOVASCULAR: No chest pain, palpitations, dizziness, or leg swelling  SKIN: Left  lateral heel ulcer    PHYSICAL EXAM  LE Focused:    Vasc:  DP/PT pulses lightly palpable, cap refill <5 secs  Derm: Left lateral heel ulcer measuring ~3.0 cm x 1.0 cm x 1.0 cm with fibrogranular base and necrotic borders. No drainage noted, probes deep but not to bone.   Neuro: Light touch sensation grossly intact  MSK: Pain on palpation to Left heel ulcer.         Cultures: taken    A: Left heel ulcer    P:  Patient evaluated, chart revie< from: Xray Foot AP + Lateral + Oblique, Left (08.07.20 @ 14:19) >     EXAM:  FOOT-LEFT                          PROCEDURE DATE:  08/07/2020          INTERPRETATION:  TECHNIQUE: Three views left foot    COMPARISON: None.    CLINICAL HISTORY: Left heel ulcer.    FINDINGS:    Frontal, lateral and oblique views of the left foot shows no evidence for acute fracture, subluxation or dislocation. Mild vascular calcifications. Small heel spurs. Mild degenerative changes of the toes and midfoot. Mild diffuse soft tissue swelling surrounding the forefoot. Mild soft tissue swelling surrounding the calcaneus. Consider CT or MRI as clinically warranted.    IMPRESSION:  Mild diffuse soft tissue swelling surrounding the forefoot and calcaneus. No evidence of osteomyelitis. MRI is more sensitive.              RADHA EASTON M.D., ATTENDING RADIOLOGIST  This document has been electronically signed. Aug  7 2020  6:25PM        < end of copied text >    Plan:  Discussed diagnosis and treatment plan with patient  Xrays reviewed: no OM  ABIS ordered - reviewed  Applied Santyl/DSD  Wound culture taken.  Recommend offloading to LLE while in bed  Patient stable for discharge from podiatry stand point and to follow at wound care centre.  Discussed with Dr. Patiño    wound care instructions:  clean ulcer with Dakin solution  Apply santyl to gauze and secure gauze at left lateral heel ulcer area with saira and tape.  change every other day.  keep dressing dry, clean and intact

## 2020-08-08 NOTE — DIETITIAN INITIAL EVALUATION ADULT. - ETIOLOGY
related to inability to meet sufficient protein-energy in setting of CHF, recurrent pleural effusions, left heel ulcer, and persistent lack of appetite with early satiety related to inability to meet sufficient protein-energy in setting of advanced age, CHF, recurrent pleural effusions, left heel ulcer, and persistent lack of appetite with early satiety

## 2020-08-08 NOTE — DIETITIAN INITIAL EVALUATION ADULT. - OTHER INFO
87 year old male with PMH HFpEF, CAD, HTN, HLD, DM type 2 on oral hypoglycemics, CKD stage III; rib fractures complicated by L-sided pleural effusion requiring thoracentesis ~2 years ago; B/L pleural effusions, s/p L thoracentesis- 01/02/20, transudative, chronic lightheadedness, GERD, who presents with dyspnea and CHF. Found with Bilateral pleural effusions, now s/p right chest tube. Per H&P, pt with 10 lb wt loss over the last two month. Noted with left heel ulcer. Pt lethargic at time of interview. Reports poor appetite/po intake and weight loss. Currently, pt consuming ~50% of meals per documentation.

## 2020-08-08 NOTE — DIETITIAN INITIAL EVALUATION ADULT. - ADD RECOMMEND
Rx: MVI and vit C 500mg daily. Encourage po intake, monitor diet tolerance, and provide assistance at meals as needed. Obtain daily weights to monitor trends.

## 2020-08-08 NOTE — PROGRESS NOTE ADULT - ASSESSMENT
87 year old male with PMHx of chronic diastolic heart failure, CAD, HTN, HLD, DM type 2 on oral hypoglycemics, CKD stage III;  L-sided pleural effusion requiring thoracentesis twice in the past with transudative fluid, GERD, who presented on 8/3/20 with dyspnea and CHF. Thoracic surgery was consulted 8/4 for right pleural effusion at which time a right pigtail catheter was placed. Currently, patient remains with high output from chest tube. Cardiology is requesting pleurx, needs to be further discussed with Thoracic surgeon. Patient also has a left pleural effusion which was evaluated by IR and will not be drained at this time as it is loculated. 87 year old male with PMHx of chronic diastolic heart failure, CAD, HTN, HLD, DM type 2 on oral hypoglycemics, CKD stage III;  L-sided pleural effusion requiring thoracentesis twice in the past with transudative fluid, GERD, who presented on 8/3/20 with dyspnea and CHF. Thoracic surgery was consulted 8/4 for right pleural effusion at which time a right pigtail catheter was placed. Currently, patient remains with high output from chest tube.     Cardiology is requesting pleurx, which was discussed further with Thoracic surgeon Dr. Mack. Per chart review, patient hasn't had prior thoracentesis of RIGHT pleural effusions, only left pleural effusions. If from Cardiology perspective, patient has been fully medically optimized patient MAY be considered for PALLIATIVE pleurx. Otherwise, will d/c chest tube once output is appropriate continue medical management per primary team. Patient also has a left pleural effusion which was evaluated by IR and will not be drained at this time as it is loculated.

## 2020-08-08 NOTE — PROGRESS NOTE ADULT - SUBJECTIVE AND OBJECTIVE BOX
CHIEF COMPLAINT/INTERVAL HISTORY:    Patient is a 87y old  Male who presents with a chief complaint of SOB, CP, possible ACS , CHF (08 Aug 2020 12:04)      HPI:  87y old  Male with PMH/ HFpEF, CAD, HTN, HLD, DM type 2 on oral hypoglycemics, CKD stage III; rib fractures complicated by L-sided pleural effusion, requiring thoracentesis ~2 years ago; B/L pleural effusions, s/p L thoracentesis- 01/02/20, transudative, chronic lightheadedness, GERD, who presents with a chief complaint of MEJÍA and gait instability. Pt states that since about 2 mos ago he has been feeling short of breath, more with exertion -- walking around his apartment or with bending to put his shoes on. Pt states that today his visiting nurse became concerned about his breathing, him getting "winded" with minimal exertion and abnormal lung sounds. Pt states that he has been feeling the same since about 2 mos ago. Pt states that sometimes he feels chest burning if he eats late or he eats heavy meal, especially meat. pt states that he lost about 10 lbs since about 2 mos ago due to has been taking Abx for L heal ulcer and his appetite has been poor. Pt states that his doctor discontinued BP medications 2/2 low BP. Pt states that he has been drinking 0.5 L bottles of water-- about 2-3 every day. Pt denies any CP/dizziness/diaphoresis/F/C/cough/abdominal pain/sick contacts/other complaints.   Above HPI per cardio  read and agreed with verified with patient -In Addition , he was started on Zinc per his PCP 2 weeks ago when he visited for SOB , he denied any edema or orthopnea , denied fever or cough or sick contact   States he is anxious about the cardiac cath and i explained the cath procedure , discussed that he might need thoracentesis with bilateral pleural effusions (04 Aug 2020 13:08)      SUBJECTIVE & OBJECTIVE/ ROS: Pt seen and examined at bedside. Pt feels well with improvement in SOB. Minimal output from chest tube. No chest pain, palpitations, light headedness/dizziness, cough, fevers/chills, abdominal pain, n/v, diarrhea/constipation, dysuria or increased urinary frequency.     ICU Vital Signs Last 24 Hrs  T(C): 36.7 (08 Aug 2020 09:25), Max: 36.9 (08 Aug 2020 05:40)  T(F): 98 (08 Aug 2020 09:25), Max: 98.5 (08 Aug 2020 05:40)  HR: 67 (08 Aug 2020 13:25) (67 - 95)  BP: 122/78 (08 Aug 2020 13:25) (103/65 - 125/82)  BP(mean): --  ABP: --  ABP(mean): --  RR: 18 (08 Aug 2020 09:25) (17 - 19)  SpO2: 96% (08 Aug 2020 09:25) (95% - 100%)        MEDICATIONS  (STANDING):  aspirin  chewable 81 milliGRAM(s) Oral daily  atorvastatin 20 milliGRAM(s) Oral at bedtime  collagenase Ointment 1 Application(s) Topical daily  Dakins Solution - 1/2 Strength 1 Application(s) Topical daily  dextrose 5%. 1000 milliLiter(s) (50 mL/Hr) IV Continuous <Continuous>  dextrose 50% Injectable 12.5 Gram(s) IV Push once  dextrose 50% Injectable 25 Gram(s) IV Push once  dextrose 50% Injectable 25 Gram(s) IV Push once  heparin  Infusion. 900 Unit(s)/Hr (9 mL/Hr) IV Continuous <Continuous>  insulin lispro (HumaLOG) corrective regimen sliding scale   SubCutaneous three times a day before meals  insulin lispro (HumaLOG) corrective regimen sliding scale   SubCutaneous at bedtime  latanoprost 0.005% Ophthalmic Solution 1 Drop(s) Both EYES at bedtime  lidocaine 1% Injectable 10 milliLiter(s) Local Injection once  metoprolol succinate ER 25 milliGRAM(s) Oral daily  pantoprazole    Tablet 40 milliGRAM(s) Oral before breakfast  povidone iodine 10% Solution 1 Application(s) Topical two times a day  torsemide 10 milliGRAM(s) Oral daily    MEDICATIONS  (PRN):  acetaminophen   Tablet .. 650 milliGRAM(s) Oral every 6 hours PRN Temp greater or equal to 38C (100.4F), Mild Pain (1 - 3), Moderate Pain (4 - 6)  ALPRAZolam 0.25 milliGRAM(s) Oral daily PRN anxiety  dextrose 40% Gel 15 Gram(s) Oral once PRN Blood Glucose LESS THAN 70 milliGRAM(s)/deciliter  glucagon  Injectable 1 milliGRAM(s) IntraMuscular once PRN Glucose LESS THAN 70 milligrams/deciliter  heparin   Injectable 4500 Unit(s) IV Push every 6 hours PRN For aPTT less than 40  heparin   Injectable 2000 Unit(s) IV Push every 6 hours PRN For aPTT between 40 - 57      LABS:                        9.9    7.30  )-----------( 202      ( 08 Aug 2020 06:01 )             35.1     08-08    135  |  96<L>  |  33.0<H>  ----------------------------<  163<H>  4.5   |  27.0  |  1.42<H>    Ca    8.3<L>      08 Aug 2020 06:01  Mg     2.0     08-07      PT/INR - ( 08 Aug 2020 06:01 )   PT: 13.5 sec;   INR: 1.17 ratio         PTT - ( 08 Aug 2020 06:01 )  PTT:55.7 sec      CAPILLARY BLOOD GLUCOSE      POCT Blood Glucose.: 190 mg/dL (08 Aug 2020 12:22)  POCT Blood Glucose.: 158 mg/dL (08 Aug 2020 08:32)  POCT Blood Glucose.: 278 mg/dL (07 Aug 2020 21:52)  POCT Blood Glucose.: 263 mg/dL (07 Aug 2020 17:07)      RECENT CULTURES:        08-07-20 @ 07:01  -  08-08-20 @ 07:00  --------------------------------------------------------  IN: 1227 mL / OUT: 485 mL / NET: 742 mL    08-08-20 @ 07:01  -  08-08-20 @ 15:03  --------------------------------------------------------  IN: 50 mL / OUT: 0 mL / NET: 50 mL          RADIOLOGY & ADDITIONAL TESTS:      PHYSICAL EXAM:    GENERAL: NAD, well-groomed, well-developed  HEAD:  Atraumatic, Normocephalic  EYES: EOMI, PERRLA, conjunctiva and sclera clear  ENMT: Moist mucous membranes  NECK: Supple, No JVD  NERVOUS SYSTEM:  Alert & Oriented X3, Motor Strength 5/5 B/L upper and lower extremities; DTRs 2+ intact and symmetric  CHEST/LUNG: Clear to auscultation bilaterally; No rales, rhonchi, wheezing, or rubs, + right sided chest tube  HEART: Regular rate and rhythm; No murmurs, rubs, or gallops  ABDOMEN: Soft, Nontender, Nondistended; Bowel sounds present  EXTREMITIES:  2+ Peripheral Pulses, No clubbing, cyanosis, or edema

## 2020-08-08 NOTE — PROGRESS NOTE ADULT - ASSESSMENT
· Assessment		  87y old  Male with PMH/ HFpEF, CAD, HTN, HLD, DM type 2 on oral hypoglycemics, CKD stage III; rib fractures complicated by L-sided pleural effusion, requiring thoracentesis ~2 years ago; B/L pleural effusions, s/p L thoracentesis- 01/02/20, transudative, chronic lightheadedness, GERD, who presents with a chief complaint of MEJÍA and gait instability. Pt states that since about 2 mos ago he has been feeling short of breath, more with exertion.   Now s/p LHC which showed non obstructive disease and s/p right pigtail which is draining serosanguinous fluid.   8/7/2020 Patient denies chest pain, sob, cough, wheezing, palpitations fever, chills,n/v/d or pain at site.    Patient stable 70-80s on the monitor.  Chest tube in place for plural effusion -  serosanguinous drainage noted - seen by CTS - 780cc drained in 24h.   8/8/2020 drainage has decreased - 1350ml in 24 hour.      S/P Coronary Angiogram   - non obstructive CAD  - no chest pain   - continue ASA, toprol, lipitor  - Diet/lifestyle modifications and medication compliance heavily reinforced   - Patient educated about wrist site care, signs and symptoms of complication post procedure, and plan of care moving forward. Patient verbalized understanding with teach-back.   - patient not a candidate for cardiac rehab secondary to: no intervention     HFpEF/Pleural Effusions  - right pleural effusion s/p pigtail placement  -  output -1529 total  - diminished lungs  - continue Toprol  - continue torsemide   - strict I/O  - daily weights  - Thoracic following   - ctyopathology pending   - 800mL fluid restriction     Atrial Fibrillation   - New onset afib  - CHADSVASC 5   - rates controlled  - continue telemetry   - start toprol xL 25mg daily   - Ct heparin infusion for AC  - eventual plan for DOAC · Assessment		  87y old  Male with PMH/ HFpEF, CAD, HTN, HLD, DM type 2 on oral hypoglycemics, CKD stage III; rib fractures complicated by L-sided pleural effusion, requiring thoracentesis ~2 years ago; B/L pleural effusions, s/p L thoracentesis- 01/02/20, transudative, chronic lightheadedness, GERD, who presents with a chief complaint of MEJÍA and gait instability. Pt states that since about 2 mos ago he has been feeling short of breath, more with exertion.   Now s/p LHC which showed non obstructive disease and s/p right pigtail which is draining serosanguinous fluid.   8/7/2020 Patient denies chest pain, sob, cough, wheezing, palpitations fever, chills,n/v/d or pain at site.    Patient stable 70-80s on the monitor.  Chest tube in place for plural effusion -  serosanguinous drainage noted - seen by CTS - 780cc drained in 24h.   8/8/2020 drainage has decreased - 135ml in 24 hour.      S/P Coronary Angiogram   - non obstructive CAD  - no chest pain   - continue ASA, toprol, lipitor  - Diet/lifestyle modifications and medication compliance heavily reinforced   - Patient educated about wrist site care, signs and symptoms of complication post procedure, and plan of care moving forward. Patient verbalized understanding with teach-back.   - patient not a candidate for cardiac rehab secondary to: no intervention     HFpEF/Pleural Effusions  - right pleural effusion s/p pigtail placement  -  output -1529 total  - chest tube output 135ml in a 24 hour period.    - diminished lungs  - continue Toprol  - continue torsemide   - strict I/O  - daily weights  - Thoracic following   - ctyopathology pending   - 800mL fluid restriction     Atrial Fibrillation   - New onset afib  - CHADSVASC 5   - rates controlled  - continue telemetry   - start toprol xL 25mg daily   - Ct heparin infusion for AC  - eventual plan for DOAC · Assessment		  87y old  Male with PMH/ HFpEF, CAD, HTN, HLD, DM type 2 on oral hypoglycemics, CKD stage III; rib fractures complicated by L-sided pleural effusion, requiring thoracentesis ~2 years ago; B/L pleural effusions, s/p L thoracentesis- 01/02/20, transudative, chronic lightheadedness, GERD, who presents with a chief complaint of MEJÍA and gait instability. Pt states that since about 2 mos ago he has been feeling short of breath, more with exertion.   Now s/p LHC which showed non obstructive disease and s/p right pigtail which is draining serosanguinous fluid.   8/7/2020 Patient denies chest pain, sob, cough, wheezing, palpitations fever, chills,n/v/d or pain at site.    Patient stable 70-80s on the monitor.  Chest tube in place for plural effusion -  serosanguinous drainage noted - seen by CTS - 780cc drained in 24h.   8/8/2020 drainage from chest tube has decreased - 135ml in 24 hour.      S/P Coronary Angiogram   - non obstructive CAD  - no chest pain   - continue ASA, toprol, lipitor  - Diet/lifestyle modifications and medication compliance heavily reinforced   - Patient educated about wrist site care, signs and symptoms of complication post procedure, and plan of care moving forward. Patient verbalized understanding with teach-back.   - patient not a candidate for cardiac rehab secondary to: no intervention     HFpEF/Pleural Effusions  - right pleural effusion s/p pigtail placement  -  output -742 total  - chest tube output 135ml in a 24 hour period.    - diminished lungs  - continue Toprol  - continue torsemide   - strict I/O  - daily weights  - Thoracic following   - ctyopathology pending   - 800mL fluid restriction     Atrial Fibrillation   - New onset afib  - CHADSVASC 5   - rates controlled  - continue telemetry   - start toprol xL 25mg daily   - Ct heparin infusion for AC  - eventual plan for DOAC

## 2020-08-08 NOTE — DIETITIAN INITIAL EVALUATION ADULT. - PERTINENT LABORATORY DATA
08-08 Na135 mmol/L Glu 163 mg/dL<H> K+ 4.5 mmol/L Cr  1.42 mg/dL<H> BUN 33.0 mg/dL<H> Phos n/a   Alb n/a   PAB n/a HgA1c 6.3%

## 2020-08-09 DIAGNOSIS — J90 PLEURAL EFFUSION, NOT ELSEWHERE CLASSIFIED: ICD-10-CM

## 2020-08-09 LAB
ANION GAP SERPL CALC-SCNC: 13 MMOL/L — SIGNIFICANT CHANGE UP (ref 5–17)
APTT BLD: 62.8 SEC — HIGH (ref 27.5–35.5)
BASOPHILS # BLD AUTO: 0.03 K/UL — SIGNIFICANT CHANGE UP (ref 0–0.2)
BASOPHILS NFR BLD AUTO: 0.4 % — SIGNIFICANT CHANGE UP (ref 0–2)
BUN SERPL-MCNC: 31 MG/DL — HIGH (ref 8–20)
CALCIUM SERPL-MCNC: 8.5 MG/DL — LOW (ref 8.6–10.2)
CHLORIDE SERPL-SCNC: 95 MMOL/L — LOW (ref 98–107)
CO2 SERPL-SCNC: 27 MMOL/L — SIGNIFICANT CHANGE UP (ref 22–29)
CREAT SERPL-MCNC: 1.34 MG/DL — HIGH (ref 0.5–1.3)
CULTURE RESULTS: SIGNIFICANT CHANGE UP
EOSINOPHIL # BLD AUTO: 0.04 K/UL — SIGNIFICANT CHANGE UP (ref 0–0.5)
EOSINOPHIL NFR BLD AUTO: 0.6 % — SIGNIFICANT CHANGE UP (ref 0–6)
GLUCOSE BLDC GLUCOMTR-MCNC: 186 MG/DL — HIGH (ref 70–99)
GLUCOSE BLDC GLUCOMTR-MCNC: 200 MG/DL — HIGH (ref 70–99)
GLUCOSE BLDC GLUCOMTR-MCNC: 203 MG/DL — HIGH (ref 70–99)
GLUCOSE BLDC GLUCOMTR-MCNC: 219 MG/DL — HIGH (ref 70–99)
GLUCOSE BLDC GLUCOMTR-MCNC: 242 MG/DL — HIGH (ref 70–99)
GLUCOSE SERPL-MCNC: 153 MG/DL — HIGH (ref 70–99)
HAPTOGLOB SERPL-MCNC: 263 MG/DL — HIGH (ref 34–200)
HCT VFR BLD CALC: 35.3 % — LOW (ref 39–50)
HGB BLD-MCNC: 10.1 G/DL — LOW (ref 13–17)
IMM GRANULOCYTES NFR BLD AUTO: 0.4 % — SIGNIFICANT CHANGE UP (ref 0–1.5)
LYMPHOCYTES # BLD AUTO: 1.03 K/UL — SIGNIFICANT CHANGE UP (ref 1–3.3)
LYMPHOCYTES # BLD AUTO: 14.8 % — SIGNIFICANT CHANGE UP (ref 13–44)
MAGNESIUM SERPL-MCNC: 2 MG/DL — SIGNIFICANT CHANGE UP (ref 1.8–2.6)
MCHC RBC-ENTMCNC: 24.6 PG — LOW (ref 27–34)
MCHC RBC-ENTMCNC: 28.6 GM/DL — LOW (ref 32–36)
MCV RBC AUTO: 85.9 FL — SIGNIFICANT CHANGE UP (ref 80–100)
MONOCYTES # BLD AUTO: 0.62 K/UL — SIGNIFICANT CHANGE UP (ref 0–0.9)
MONOCYTES NFR BLD AUTO: 8.9 % — SIGNIFICANT CHANGE UP (ref 2–14)
NEUTROPHILS # BLD AUTO: 5.22 K/UL — SIGNIFICANT CHANGE UP (ref 1.8–7.4)
NEUTROPHILS NFR BLD AUTO: 74.9 % — SIGNIFICANT CHANGE UP (ref 43–77)
OB PNL STL: NEGATIVE — SIGNIFICANT CHANGE UP
PHOSPHATE SERPL-MCNC: 1.9 MG/DL — LOW (ref 2.4–4.7)
PLATELET # BLD AUTO: 204 K/UL — SIGNIFICANT CHANGE UP (ref 150–400)
POTASSIUM SERPL-MCNC: 4.6 MMOL/L — SIGNIFICANT CHANGE UP (ref 3.5–5.3)
POTASSIUM SERPL-SCNC: 4.6 MMOL/L — SIGNIFICANT CHANGE UP (ref 3.5–5.3)
RBC # BLD: 4.11 M/UL — LOW (ref 4.2–5.8)
RBC # FLD: 23.5 % — HIGH (ref 10.3–14.5)
SODIUM SERPL-SCNC: 134 MMOL/L — LOW (ref 135–145)
SPECIMEN SOURCE: SIGNIFICANT CHANGE UP
WBC # BLD: 6.97 K/UL — SIGNIFICANT CHANGE UP (ref 3.8–10.5)
WBC # FLD AUTO: 6.97 K/UL — SIGNIFICANT CHANGE UP (ref 3.8–10.5)

## 2020-08-09 PROCEDURE — 99232 SBSQ HOSP IP/OBS MODERATE 35: CPT

## 2020-08-09 PROCEDURE — 71045 X-RAY EXAM CHEST 1 VIEW: CPT | Mod: 26

## 2020-08-09 RX ORDER — OXYCODONE AND ACETAMINOPHEN 5; 325 MG/1; MG/1
1 TABLET ORAL EVERY 4 HOURS
Refills: 0 | Status: DISCONTINUED | OUTPATIENT
Start: 2020-08-09 | End: 2020-08-12

## 2020-08-09 RX ADMIN — HEPARIN SODIUM 1000 UNIT(S)/HR: 5000 INJECTION INTRAVENOUS; SUBCUTANEOUS at 08:30

## 2020-08-09 RX ADMIN — OXYCODONE AND ACETAMINOPHEN 1 TABLET(S): 5; 325 TABLET ORAL at 22:00

## 2020-08-09 RX ADMIN — OXYCODONE AND ACETAMINOPHEN 1 TABLET(S): 5; 325 TABLET ORAL at 17:55

## 2020-08-09 RX ADMIN — Medication 2: at 16:54

## 2020-08-09 RX ADMIN — PANTOPRAZOLE SODIUM 40 MILLIGRAM(S): 20 TABLET, DELAYED RELEASE ORAL at 06:30

## 2020-08-09 RX ADMIN — Medication 1: at 08:28

## 2020-08-09 RX ADMIN — Medication 500 MILLIGRAM(S): at 08:28

## 2020-08-09 RX ADMIN — Medication 1 APPLICATION(S): at 08:28

## 2020-08-09 RX ADMIN — Medication 2: at 13:14

## 2020-08-09 RX ADMIN — OXYCODONE AND ACETAMINOPHEN 1 TABLET(S): 5; 325 TABLET ORAL at 16:55

## 2020-08-09 RX ADMIN — LATANOPROST 1 DROP(S): 0.05 SOLUTION/ DROPS OPHTHALMIC; TOPICAL at 21:29

## 2020-08-09 RX ADMIN — Medication 1 APPLICATION(S): at 08:29

## 2020-08-09 RX ADMIN — Medication 1 APPLICATION(S): at 16:17

## 2020-08-09 RX ADMIN — Medication 81 MILLIGRAM(S): at 08:28

## 2020-08-09 RX ADMIN — Medication 10 MILLIGRAM(S): at 06:30

## 2020-08-09 RX ADMIN — Medication 1 APPLICATION(S): at 06:41

## 2020-08-09 RX ADMIN — Medication 25 MILLIGRAM(S): at 06:30

## 2020-08-09 RX ADMIN — ATORVASTATIN CALCIUM 20 MILLIGRAM(S): 80 TABLET, FILM COATED ORAL at 21:29

## 2020-08-09 RX ADMIN — Medication 1 TABLET(S): at 08:28

## 2020-08-09 RX ADMIN — OXYCODONE AND ACETAMINOPHEN 1 TABLET(S): 5; 325 TABLET ORAL at 21:27

## 2020-08-09 NOTE — PROGRESS NOTE ADULT - ASSESSMENT
87y old  Male with PMH/ HFpEF, CAD, HTN, HLD, DM type 2 on oral hypoglycemics, CKD stage III; rib fractures complicated by L-sided pleural effusion, requiring thoracentesis ~2 years ago; B/L pleural effusions, s/p L thoracentesis- 01/02/20, transudative, chronic lightheadedness, GERD, who presents with a chief complaint of MEJÍA and gait instability. Pt states that since about 2 mos ago he has been feeling short of breath, more with exertion.   Now s/p LHC which showed non obstructive disease and s/p right pigtail which is draining serosanguinous fluid.   8/7/2020 Patient denies chest pain, sob, cough, wheezing, palpitations fever, chills,n/v/d or pain at site.    Patient stable 70-80s on the monitor.  Chest tube in place for plural effusion -  serosanguinous drainage has decreased.    8/8/2020 drainage has decreased - 1350ml in 24 hour.    8/9/2020 serosanguinous drainage - 55ml in 24 hour period    S/P Coronary Angiogram   - non obstructive CAD  - no chest pain   - continue ASA, Toprol Lipitor  - Diet/lifestyle modifications and medication compliance heavily reinforced     HFpEF/Pleural Effusions  - right pleural effusion s/p pigtail placement  -  output - -960ml  - diminished lungs  - continue Toprol  - continue torsemide   - strict I/O  - daily weights  - CTA following  - 800mL fluid restriction     Atrial Fibrillation   - New onset afib  - CHADSVASC 5   - rates controlled  - continue telemetry   - start toprol xL 25mg daily   - Ct heparin infusion for AC  - eventual plan for DOAC Eliquis before discharge) 87y old  Male with PMH/ HFpEF, CAD, HTN, HLD, DM type 2 on oral hypoglycemics, CKD stage III; rib fractures complicated by L-sided pleural effusion, requiring thoracentesis ~2 years ago; B/L pleural effusions, s/p L thoracentesis- 01/02/20, transudative, chronic lightheadedness, GERD, who presents with a chief complaint of MEJÍA and gait instability. Pt states that since about 2 mos ago he has been feeling short of breath, more with exertion.   Now s/p LHC which showed non obstructive disease and s/p right pigtail which is draining serosanguinous fluid.   8/7/2020 Patient denies chest pain, sob, cough, wheezing, palpitations fever, chills,n/v/d or pain at site.    Patient stable 70-80s on the monitor.  Chest tube in place for plural effusion -  serosanguinous drainage has decreased.    8/8/2020 drainage has decreased - 135ml in 24 hour.    8/9/2020 serosanguinous drainage - 55ml in 24 hour period    S/P Coronary Angiogram   - non obstructive CAD  - no chest pain   - continue ASA, Toprol Lipitor  - Diet/lifestyle modifications and medication compliance heavily reinforced     HFpEF/Pleural Effusions  - right pleural effusion s/p pigtail placement  -  output - 135ml  - diminished lungs  - continue Toprol  - continue torsemide   - strict I/O  - daily weights  - CTA following  - 800mL fluid restriction     Atrial Fibrillation   - New onset afib  - CHADSVASC 5   - rates controlled  - continue telemetry   - start toprol xL 25mg daily   - Ct heparin infusion for AC  - eventual plan for DOAC Eliquis before discharge) 87y old  Male with PMH/ HFpEF, CAD, HTN, HLD, DM type 2 on oral hypoglycemics, CKD stage III; rib fractures complicated by L-sided pleural effusion, requiring thoracentesis ~2 years ago; B/L pleural effusions, s/p L thoracentesis- 01/02/20, transudative, chronic lightheadedness, GERD, who presents with a chief complaint of MEJÍA and gait instability. Pt states that since about 2 mos ago he has been feeling short of breath, more with exertion.   Now s/p LHC which showed non obstructive disease and s/p right pigtail which is draining serosanguinous fluid.   8/7/2020 Patient denies chest pain, sob, cough, wheezing, palpitations fever, chills,n/v/d or pain at site.    Patient stable 70-80s on the monitor.  Chest tube in place for plural effusion -  serosanguinous drainage has decreased.    8/8/2020 drainage has decreased - 135ml in 24 hour.    8/9/2020 serosanguinous drainage - 55ml in 24 hour period    S/P Coronary Angiogram   - non obstructive CAD  - no chest pain   - continue ASA, Toprol Lipitor  - Diet/lifestyle modifications and medication compliance heavily reinforced     HFpEF/Pleural Effusions  - right pleural effusion s/p pigtail placement  - output total -960,  -chest tube output 55ml in a 24 hour period    - diminished lungs  - continue Toprol  - continue torsemide   - strict I/O  - daily weights  - CTA following  - 800mL fluid restriction     Atrial Fibrillation   - New onset afib  - CHADSVASC 5   - rates controlled  - continue telemetry   - start toprol xL 25mg daily   - Ct heparin infusion for AC  - eventual plan for DOAC Eliquis before discharge) 87y old  Male with PMH/ HFpEF, CAD, HTN, HLD, DM type 2 on oral hypoglycemics, CKD stage III; rib fractures complicated by L-sided pleural effusion, requiring thoracentesis ~2 years ago; B/L pleural effusions, s/p L thoracentesis- 01/02/20, transudative, chronic lightheadedness, GERD, who presents with a chief complaint of MEJÍA and gait instability. Pt states that since about 2 mos ago he has been feeling short of breath, more with exertion.   Now s/p LHC which showed non obstructive disease and s/p right pigtail which is draining serosanguinous fluid.   8/7/2020 Patient denies chest pain, sob, cough, wheezing, palpitations fever, chills,n/v/d or pain at site.    Patient stable 70-80s on the monitor.  Chest tube in place for plural effusion -  serosanguinous drainage has decreased.    8/8/2020 drainage has decreased - 135ml in 24 hour.    8/9/2020 serosanguinous drainage - 55ml in 24 hour period    S/P Coronary Angiogram   - non obstructive CAD  - no chest pain   - continue ASA, Toprol Lipitor  - Diet/lifestyle modifications and medication compliance heavily reinforced     HFpEF/Pleural Effusions  - right pleural effusion s/p pigtail placement  - output total -960ml  -chest tube output 55ml in a 24 hour period    - diminished lungs  - continue Toprol  - continue torsemide   - strict I/O  - daily weights  - CTA following  - 800mL fluid restriction     Atrial Fibrillation   - New onset afib  - CHADSVASC 5   - rates controlled  - continue telemetry   - start toprol xL 25mg daily   - Ct heparin infusion for AC  - eventual plan for DOAC Eliquis before discharge)

## 2020-08-09 NOTE — PROGRESS NOTE ADULT - ASSESSMENT
87 year old male with PMHx of chronic diastolic heart failure, CAD, HTN, HLD, DM type 2 on oral hypoglycemics, CKD stage III;  L-sided pleural effusion requiring thoracentesis twice in the past with transudative fluid, GERD, who presented on 8/3/20 with dyspnea and CHF. Thoracic surgery was consulted 8/4 for right pleural effusion at which time a right pigtail catheter was placed. Currently, patient remains with high output from chest tube.     Cardiology is requesting pleurx, which was discussed further with Thoracic surgeon Dr. Mack. Per chart review, patient hasn't had prior thoracentesis of RIGHT pleural effusions, only left pleural effusions. Decreased outout from right pigtail over the last 24 hours and patient is tolerating medical management. CXR this AM shows resolution of right pleural effusion. At this time, plan to monitor output from chest tube until tomorrow 8/10 and d/c if output remains low. NO PLAN FOR PLEURX CATHETER AT THIS TIME.

## 2020-08-09 NOTE — PROGRESS NOTE ADULT - SUBJECTIVE AND OBJECTIVE BOX
Subjective:    Vital Signs:  Vital Signs Last 24 Hrs  T(C): 36.7 (08-09-20 @ 08:28), Max: 36.8 (08-08-20 @ 15:46)  T(F): 98 (08-09-20 @ 08:28), Max: 98.2 (08-08-20 @ 15:46)  HR: 94 (08-09-20 @ 08:28) (67 - 101)  BP: 122/78 (08-09-20 @ 08:28) (117/80 - 122/78)  RR: 16 (08-09-20 @ 08:28) (15 - 19)  SpO2: 98% (08-09-20 @ 08:28) (95% - 99%) on (O2)    Relevant labs, radiology and Medications reviewed    Pertinent Physical Exam      08-08 @ 07:01  -  08-09 @ 07:00  --------------------------------------------------------  IN:    heparin  Infusion.: 240 mL    Oral Fluid: 30 mL  Total IN: 270 mL    OUT:    Chest Tube: 55 mL    Voided: 1175 mL  Total OUT: 1230 mL    Total NET: -960 mL      08-09 @ 07:01  -  08-09 @ 11:34  --------------------------------------------------------  IN:    heparin  Infusion.: 50 mL  Total IN: 50 mL    OUT:  Total OUT: 0 mL    Total NET: 50 mL Subjective: Patient denies acute pain with radiating or aggravating factors.  He denies chest pain, shortness of breath, palpitations, headache, dizziness, nausea, or vomiting.     Vital Signs:  Vital Signs Last 24 Hrs  T(C): 36.7 (08-09-20 @ 08:28), Max: 36.8 (08-08-20 @ 15:46)  T(F): 98 (08-09-20 @ 08:28), Max: 98.2 (08-08-20 @ 15:46)  HR: 94 (08-09-20 @ 08:28) (67 - 101)  BP: 122/78 (08-09-20 @ 08:28) (117/80 - 122/78)  RR: 16 (08-09-20 @ 08:28) (15 - 19)  SpO2: 98% (08-09-20 @ 08:28) (95% - 99%) on (O2)    Relevant labs, radiology and Medications reviewed    Pertinent Physical Exam  General: Well appearing, NAD  Neuro: AxO x3, non-focal, SAPP  Cardiac: S1S2, no murmurs  Pulm: CTA b/l, no wheezing or rales  Abdomen: Soft, NT, ND, normoactive BS  Peripheral: +DP pulses b/l, no peripheral edema     08-08 @ 07:01  -  08-09 @ 07:00  --------------------------------------------------------  IN:    heparin  Infusion.: 240 mL    Oral Fluid: 30 mL  Total IN: 270 mL    OUT:    Chest Tube: 55 mL    Voided: 1175 mL  Total OUT: 1230 mL    Total NET: -960 mL    08-09 @ 07:01  -  08-09 @ 11:34  --------------------------------------------------------  IN:    heparin  Infusion.: 50 mL  Total IN: 50 mL    OUT:  Total OUT: 0 mL    Total NET: 50 mL

## 2020-08-09 NOTE — PROGRESS NOTE ADULT - SUBJECTIVE AND OBJECTIVE BOX
CHIEF COMPLAINT/INTERVAL HISTORY:    Patient is a 87y old  Male who presents with a chief complaint of SOB, CP, possible ACS , CHF (08 Aug 2020 12:04)      HPI:  87y old  Male with PMH/ HFpEF, CAD, HTN, HLD, DM type 2 on oral hypoglycemics, CKD stage III; rib fractures complicated by L-sided pleural effusion, requiring thoracentesis ~2 years ago; B/L pleural effusions, s/p L thoracentesis- 01/02/20, transudative, chronic lightheadedness, GERD, who presents with a chief complaint of MEJÍA and gait instability. Pt states that since about 2 mos ago he has been feeling short of breath, more with exertion -- walking around his apartment or with bending to put his shoes on. Pt states that today his visiting nurse became concerned about his breathing, him getting "winded" with minimal exertion and abnormal lung sounds. Pt states that he has been feeling the same since about 2 mos ago. Pt states that sometimes he feels chest burning if he eats late or he eats heavy meal, especially meat. pt states that he lost about 10 lbs since about 2 mos ago due to has been taking Abx for L heal ulcer and his appetite has been poor. Pt states that his doctor discontinued BP medications 2/2 low BP. Pt states that he has been drinking 0.5 L bottles of water-- about 2-3 every day. Pt denies any CP/dizziness/diaphoresis/F/C/cough/abdominal pain/sick contacts/other complaints.   Above HPI per cardio  read and agreed with verified with patient -In Addition , he was started on Zinc per his PCP 2 weeks ago when he visited for SOB , he denied any edema or orthopnea , denied fever or cough or sick contact   States he is anxious about the cardiac cath and i explained the cath procedure , discussed that he might need thoracentesis with bilateral pleural effusions (04 Aug 2020 13:08)      SUBJECTIVE & OBJECTIVE/ ROS: Pt seen and examined at bedside. Pt feels well with improvement in SOB. Minimal output from chest tube. 4 beats of VT. Pt asymptomatic. No chest pain, palpitations, light headedness/dizziness, cough, fevers/chills, abdominal pain, n/v, diarrhea/constipation, dysuria or increased urinary frequency.     ICU Vital Signs Last 24 Hrs  T(C): 36.7 (08 Aug 2020 09:25), Max: 36.9 (08 Aug 2020 05:40)  T(F): 98 (08 Aug 2020 09:25), Max: 98.5 (08 Aug 2020 05:40)  HR: 67 (08 Aug 2020 13:25) (67 - 95)  BP: 122/78 (08 Aug 2020 13:25) (103/65 - 125/82)  BP(mean): --  ABP: --  ABP(mean): --  RR: 18 (08 Aug 2020 09:25) (17 - 19)  SpO2: 96% (08 Aug 2020 09:25) (95% - 100%)        MEDICATIONS  (STANDING):  aspirin  chewable 81 milliGRAM(s) Oral daily  atorvastatin 20 milliGRAM(s) Oral at bedtime  collagenase Ointment 1 Application(s) Topical daily  Dakins Solution - 1/2 Strength 1 Application(s) Topical daily  dextrose 5%. 1000 milliLiter(s) (50 mL/Hr) IV Continuous <Continuous>  dextrose 50% Injectable 12.5 Gram(s) IV Push once  dextrose 50% Injectable 25 Gram(s) IV Push once  dextrose 50% Injectable 25 Gram(s) IV Push once  heparin  Infusion. 900 Unit(s)/Hr (9 mL/Hr) IV Continuous <Continuous>  insulin lispro (HumaLOG) corrective regimen sliding scale   SubCutaneous three times a day before meals  insulin lispro (HumaLOG) corrective regimen sliding scale   SubCutaneous at bedtime  latanoprost 0.005% Ophthalmic Solution 1 Drop(s) Both EYES at bedtime  lidocaine 1% Injectable 10 milliLiter(s) Local Injection once  metoprolol succinate ER 25 milliGRAM(s) Oral daily  pantoprazole    Tablet 40 milliGRAM(s) Oral before breakfast  povidone iodine 10% Solution 1 Application(s) Topical two times a day  torsemide 10 milliGRAM(s) Oral daily    MEDICATIONS  (PRN):  acetaminophen   Tablet .. 650 milliGRAM(s) Oral every 6 hours PRN Temp greater or equal to 38C (100.4F), Mild Pain (1 - 3), Moderate Pain (4 - 6)  ALPRAZolam 0.25 milliGRAM(s) Oral daily PRN anxiety  dextrose 40% Gel 15 Gram(s) Oral once PRN Blood Glucose LESS THAN 70 milliGRAM(s)/deciliter  glucagon  Injectable 1 milliGRAM(s) IntraMuscular once PRN Glucose LESS THAN 70 milligrams/deciliter  heparin   Injectable 4500 Unit(s) IV Push every 6 hours PRN For aPTT less than 40  heparin   Injectable 2000 Unit(s) IV Push every 6 hours PRN For aPTT between 40 - 57      LABS:                        9.9    7.30  )-----------( 202      ( 08 Aug 2020 06:01 )             35.1     08-08    135  |  96<L>  |  33.0<H>  ----------------------------<  163<H>  4.5   |  27.0  |  1.42<H>    Ca    8.3<L>      08 Aug 2020 06:01  Mg     2.0     08-07      PT/INR - ( 08 Aug 2020 06:01 )   PT: 13.5 sec;   INR: 1.17 ratio         PTT - ( 08 Aug 2020 06:01 )  PTT:55.7 sec      CAPILLARY BLOOD GLUCOSE      POCT Blood Glucose.: 190 mg/dL (08 Aug 2020 12:22)  POCT Blood Glucose.: 158 mg/dL (08 Aug 2020 08:32)  POCT Blood Glucose.: 278 mg/dL (07 Aug 2020 21:52)  POCT Blood Glucose.: 263 mg/dL (07 Aug 2020 17:07)      RECENT CULTURES:        08-07-20 @ 07:01  -  08-08-20 @ 07:00  --------------------------------------------------------  IN: 1227 mL / OUT: 485 mL / NET: 742 mL    08-08-20 @ 07:01  -  08-08-20 @ 15:03  --------------------------------------------------------  IN: 50 mL / OUT: 0 mL / NET: 50 mL          RADIOLOGY & ADDITIONAL TESTS:      PHYSICAL EXAM:    GENERAL: NAD, well-groomed, well-developed  HEAD:  Atraumatic, Normocephalic  EYES: EOMI, PERRLA, conjunctiva and sclera clear  ENMT: Moist mucous membranes  NECK: Supple, No JVD  NERVOUS SYSTEM:  Alert & Oriented X3, Motor Strength 5/5 B/L upper and lower extremities; DTRs 2+ intact and symmetric  CHEST/LUNG: Clear to auscultation bilaterally; No rales, rhonchi, wheezing, or rubs, + right sided chest tube  HEART: Regular rate and rhythm; No murmurs, rubs, or gallops  ABDOMEN: Soft, Nontender, Nondistended; Bowel sounds present  EXTREMITIES:  2+ Peripheral Pulses, No clubbing, cyanosis, or edema

## 2020-08-09 NOTE — PROGRESS NOTE ADULT - ASSESSMENT
87y old  Male with PMH/ HFpEF, CAD, HTN, HLD, DM type 2 on oral hypoglycemics, CKD stage III; rib fractures complicated by L-sided pleural effusion requiring thoracentesis ~2 years ago; B/L pleural effusions, s/p L thoracentesis- 01/02/20, transudative, chronic lightheadedness, GERD, who presents with dyspnea and CHF.    Dyspnea likely multifactorial - Bilateral pleural effusions , acute on chronic systolic and diastolic CHF, Anemia   - improved s/p R chest tube. Spoke to IR, loculated L sided effusions, will defer L thoracocentesis at this time and monitor, R pleural effusion.  780cc drained in 24h.  - Lasix BID changed to Torsemide   -Transudate on fluid analysis  - effusion cytology negative for malignant cells  - thoracic surgery following, will remove drain when output minimal, daily cxr    Low output, Maintain chest tube to water seal today   Follow up daily output  Repeat CXR in AM while chest tube is in place  No plan for Pleurex on Monday. Plan to D/C chest tube tomorrow 8/10 if output remains low as per CTS  TTE done showed Left ventricular ejection fraction, by visual estimation, is 45 to 50%, Mildly decreased global left ventricular systolic function, Spectral Doppler shows pseudonormal pattern of left ventricular myocardial filling (Grade II diastolic dysfunction), Large pleural effusion in both left and right lateral regions, Estimated pulmonary artery systolic pressure is 55.3 mmHg assuming a right atrial pressure of 8 mmHg, which is consistent with moderate pulmonary hypertension.          Acute on chronic systolic & diastolic CHF with negative troponin and elevated BNP   - s/p Echo, EF 45-50%  - Lasix BID changed to Torsemide   - holding ACEI given soft BPs  - s/p cardiac cath, non-obstructive CAD    New onset Afib  - CHADSVASC 5  - started metoprolol succinate 25mg QD as per cards  - started heparin for AC as per cards. Once catheter is placed, can dc home on eliquis 2.5 mg bid  TSH is 6.42, will recheck, patient is s/p cath showed Overall non-obstructive CAD. as per them Would maximize  medical therapy for diastolic CHF and CAD.      L heel ulcer  - pt followed outpatient by Dr Erazo, who recommended betadine BID  - pt due for outpatient bone culture as per Dr Erazo  Xrays ordered - pending  ABIS ordered - pending  Applied Santyl/DSD  Wound culture taken - pending results  Recommend offloading to LLE while in bed  Podiatry following    Hypomagnesemia  - repleted, goal >2.0, will monitor     T2DM  - HbA1c 6.3% , goal <8.0%  - d/c sliding scale and monitor with daily labs  - DASH diet    Weight loss and early satiety  - could be CHF related but may need GI workup. 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     DVT PPX- heparin     Dispo: D/c in am once Ct removed 87y old  Male with PMH/ HFpEF, CAD, HTN, HLD, DM type 2 on oral hypoglycemics, CKD stage III; rib fractures complicated by L-sided pleural effusion requiring thoracentesis ~2 years ago; B/L pleural effusions, s/p L thoracentesis- 01/02/20, transudative, chronic lightheadedness, GERD, who presents with dyspnea and CHF.    Dyspnea likely multifactorial - Bilateral pleural effusions , acute on chronic systolic and diastolic CHF, Anemia   - improved s/p R chest tube. Spoke to IR, loculated L sided effusions, will defer L thoracocentesis at this time and monitor, R pleural effusion.  780cc drained in 24h.  - Lasix BID changed to Torsemide   -Transudate on fluid analysis  - effusion cytology negative for malignant cells  - thoracic surgery following, will remove drain when output minimal, daily cxr    Low output, Maintain chest tube to water seal today   Follow up daily output  Repeat CXR in AM while chest tube is in place  No plan for Pleurex on Monday. Plan to D/C chest tube tomorrow 8/10 if output remains low as per CTS  TTE done showed Left ventricular ejection fraction, by visual estimation, is 45 to 50%, Mildly decreased global left ventricular systolic function, Spectral Doppler shows pseudonormal pattern of left ventricular myocardial filling (Grade II diastolic dysfunction), Large pleural effusion in both left and right lateral regions, Estimated pulmonary artery systolic pressure is 55.3 mmHg assuming a right atrial pressure of 8 mmHg, which is consistent with moderate pulmonary hypertension.        NSVT-  Keep K > 4 & Mg > 2  Monitor on tele    Acute on chronic systolic & diastolic CHF with negative troponin and elevated BNP   - s/p Echo, EF 45-50%  - Lasix BID changed to Torsemide   - holding ACEI given soft BPs  - s/p cardiac cath, non-obstructive CAD    New onset Afib  - CHADSVASC 5  - started metoprolol succinate 25mg QD as per cards  - started heparin for AC as per cards. Once catheter is placed, can dc home on eliquis 2.5 mg bid  TSH is 6.42, will recheck, patient is s/p cath showed Overall non-obstructive CAD. as per them Would maximize  medical therapy for diastolic CHF and CAD.      L heel ulcer  - pt followed outpatient by Dr Erazo, who recommended betadine BID  - pt due for outpatient bone culture as per Dr Kacey  Xrays ordered - pending  ABIS ordered - pending  Applied Santyl/DSD  Wound culture taken - pending results  Recommend offloading to LLE while in bed  Podiatry following    Hypomagnesemia  - repleted, goal >2.0, will monitor     T2DM  - HbA1c 6.3% , goal <8.0%  - d/c sliding scale and monitor with daily labs  - DASH diet    Weight loss and early satiety  - could be CHF related but may need GI workup. 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     DVT PPX- heparin     Dispo: D/c in am once Ct removed

## 2020-08-09 NOTE — PROGRESS NOTE ADULT - SUBJECTIVE AND OBJECTIVE BOX
[place] [facility]                                                        Office: [address] [address]                                                       Telephone: [phone]. Fax:[phone]                                                                             PROGRESS NOTE   Reason for follow up:  SOB, CP,m possible ACS , CHF                            Overnight: No new events.   Update:   Chest tube draining serosanguinous fluid 45ml in a 24 hour period.     Subjective: "I would like to know whats going on"   Complains of:  Nothing  Review of symptoms: Cardiac:  No chest pain. No dyspnea. No palpitations.  Respiratory: no cough. No dyspnea  Gastrointestinal: No diarrhea. No abdominal pain. No bleeding.     Past medical history: No updates.   Chronic conditions:  PMH/ HFpEF, CAD, HTN, HLD, DM type 2 on oral hypoglycemics, CKD stage III; rib fractures complicated by L-sided pleural effusion, requiring thoracentesis ~2 years ago; B/L pleural effusions, s/p L thoracentesis- 01/02/20, transudative, chronic lightheadedness, GERD,       Vitals:  T(C): 36.7 (08-09-20 @ 08:28), Max: 36.8 (08-08-20 @ 15:46)  HR: 94 (08-09-20 @ 08:28) (67 - 101)  BP: 122/78 (08-09-20 @ 08:28) (117/80 - 122/78)  RR: 16 (08-09-20 @ 08:28) (15 - 19)  SpO2: 98% (08-09-20 @ 08:28) (95% - 99%)  Wt(kg): --  I&O's Summary    08 Aug 2020 07:01  -  09 Aug 2020 07:00  --------------------------------------------------------  IN: 270 mL / OUT: 1230 mL / NET: -960 mL      Weight (kg): 59 (08-06 @ 11:35)    PHYSICAL EXAM:  Appearance: Comfortable. No acute distress  HEENT:  Head and neck: Atraumatic. Normocephalic.  Normal oral mucosa, PERRL, Neck is supple. No JVD, No carotid bruit.   Neurologic: A & O x 3, no focal deficits. EOMI , Cranial nerves are intact.  Lymphatic: No cervical lymphadenopathy  Cardiovascular: Normal S1 S2, No murmur, rubs/gallops. No JVD, No edema  Respiratory: Lungs clear to auscultation  Gastrointestinal:  Soft, Non-tender, + BS  Lower Extremities: No edema  Psychiatry: Patient is calm. No agitation. Mood & affect appropriate  Skin: No rashes/ ecchymoses/cyanosis/ulcers visualized on the face, hands or feet.    CURRENT MEDICATIONS:  metoprolol succinate ER 25 milliGRAM(s) Oral daily  torsemide 10 milliGRAM(s) Oral daily    pantoprazole    Tablet  atorvastatin  dextrose 50% Injectable  dextrose 50% Injectable  dextrose 50% Injectable  insulin lispro (HumaLOG) corrective regimen sliding scale  insulin lispro (HumaLOG) corrective regimen sliding scale  ascorbic acid  aspirin  chewable  collagenase Ointment  Dakins Solution - 1/2 Strength  dextrose 5%.  heparin  Infusion.  latanoprost 0.005% Ophthalmic Solution  multivitamin  povidone iodine 10% Solution      LABS:	 	                              10.1   6.97  )-----------( 204      ( 09 Aug 2020 07:41 )             35.3     08-09    134<L>  |  95<L>  |  31.0<H>  ----------------------------<  153<H>  4.6   |  27.0  |  1.34<H>    Ca    8.5<L>      09 Aug 2020 07:41  Phos  1.9     08-09  Mg     2.0     08-09      proBNP:   Lipid Profile: Date: 08-04 @ 07:49  Total cholesterol 73; Direct LDL: 28; HDL: 25; Triglycerides:101    HgA1c: TSH: Thyroid Stimulating Hormone, Serum: 6.57 uIU/mL      TELEMETRY: Reviewed    ECG:  Reviewed by me. 	    DIAGNOSTIC TESTING:  [ ] Echocardiogram:   [ ]  Catheterization:  [ ] Stress Test:    OTHER: [place] [facility]                                                        Office: [address] [address]                                                       Telephone: [phone]. Fax:[phone]                                                                             PROGRESS NOTE   Reason for follow up:  SOB, CP,m possible ACS , CHF                            Overnight: No new events.   Update:   Chest tube draining serosanguinous fluid 55ml in a 24 hour period.     Subjective: "I would like to know whats going on"   Complains of:  Nothing  Review of symptoms: Cardiac:  No chest pain. No dyspnea. No palpitations.  Respiratory: no cough. No dyspnea  Gastrointestinal: No diarrhea. No abdominal pain. No bleeding.     Past medical history: No updates.   Chronic conditions:  PMH/ HFpEF, CAD, HTN, HLD, DM type 2 on oral hypoglycemics, CKD stage III; rib fractures complicated by L-sided pleural effusion, requiring thoracentesis ~2 years ago; B/L pleural effusions, s/p L thoracentesis- 01/02/20, transudative, chronic lightheadedness, GERD,       Vitals:  T(C): 36.7 (08-09-20 @ 08:28), Max: 36.8 (08-08-20 @ 15:46)  HR: 94 (08-09-20 @ 08:28) (67 - 101)  BP: 122/78 (08-09-20 @ 08:28) (117/80 - 122/78)  RR: 16 (08-09-20 @ 08:28) (15 - 19)  SpO2: 98% (08-09-20 @ 08:28) (95% - 99%)  I&O's Summary  08 Aug 2020 07:01  -  09 Aug 2020 07:00  --------------------------------------------------------  IN: 270 mL / OUT: 1230 mL / NET: -960 mL  Weight (kg): 59 (08-06 @ 11:35)    PHYSICAL EXAM:  Appearance: Comfortable. No acute distress, frail elderly male  HEENT:  Head and neck: Atraumatic. Normocephalic.  Normal oral mucosa, PERRL, Neck is supple. No JVD, No carotid bruit.   Neurologic: A & O x 3, no focal deficits. EOMI , Cranial nerves are intact.  Lymphatic: No cervical lymphadenopathy  Cardiovascular: Normal S1 S2, No murmur, rubs/gallops. No JVD, No edema  Respiratory: Lungs clear to auscultation, diminished bilaterally, chest tube site intact right flank area.    Gastrointestinal:  Soft, Non-tender, + BS  Lower Extremities: No edema  Psychiatry: Patient is calm. No agitation. Mood & affect appropriate  Skin: No rashes/ ecchymoses/cyanosis/ulcers visualized on the face, hands or feet.    CURRENT MEDICATIONS:  metoprolol succinate ER 25 milliGRAM(s) Oral daily  torsemide 10 milliGRAM(s) Oral daily  pantoprazole    Tablet  atorvastatin  dextrose 50% Injectable  insulin lispro (HumaLOG) corrective regimen sliding scale  ascorbic acid  aspirin  chewable  collagenase Ointment  Dakins Solution - 1/2 Strength  dextrose 5%.  heparin  Infusion.  latanoprost 0.005% Ophthalmic Solution  multivitamin  povidone iodine 10% Solution      LABS:	 	                              10.1   6.97  )-----------( 204      ( 09 Aug 2020 07:41 )             35.3     08-09    134<L>  |  95<L>  |  31.0<H>  ----------------------------<  153<H>  4.6   |  27.0  |  1.34<H>    Ca    8.5<L>      09 Aug 2020 07:41  Phos  1.9     08-09  Mg     2.0    Lipid Profile: Date: 08-04 @ 07:49  Total cholesterol 73; Direct LDL: 28; HDL: 25; Triglycerides:101    HgA1c: TSH: Thyroid Stimulating Hormone, Serum: 6.57 uIU/mL      TELEMETRY: Reviewed

## 2020-08-10 PROBLEM — I50.9 HEART FAILURE, UNSPECIFIED: Chronic | Status: ACTIVE | Noted: 2020-08-03

## 2020-08-10 PROBLEM — I49.3 VENTRICULAR PREMATURE DEPOLARIZATION: Chronic | Status: ACTIVE | Noted: 2020-08-03

## 2020-08-10 PROBLEM — I10 ESSENTIAL (PRIMARY) HYPERTENSION: Chronic | Status: ACTIVE | Noted: 2020-08-03

## 2020-08-10 PROBLEM — E11.9 TYPE 2 DIABETES MELLITUS WITHOUT COMPLICATIONS: Chronic | Status: ACTIVE | Noted: 2020-08-03

## 2020-08-10 PROBLEM — E78.5 HYPERLIPIDEMIA, UNSPECIFIED: Chronic | Status: ACTIVE | Noted: 2020-08-03

## 2020-08-10 LAB
ANION GAP SERPL CALC-SCNC: 13 MMOL/L — SIGNIFICANT CHANGE UP (ref 5–17)
APTT BLD: 58.8 SEC — HIGH (ref 27.5–35.5)
BASOPHILS # BLD AUTO: 0.03 K/UL — SIGNIFICANT CHANGE UP (ref 0–0.2)
BASOPHILS NFR BLD AUTO: 0.4 % — SIGNIFICANT CHANGE UP (ref 0–2)
BUN SERPL-MCNC: 31 MG/DL — HIGH (ref 8–20)
CALCIUM SERPL-MCNC: 8.4 MG/DL — LOW (ref 8.6–10.2)
CHLORIDE SERPL-SCNC: 94 MMOL/L — LOW (ref 98–107)
CO2 SERPL-SCNC: 27 MMOL/L — SIGNIFICANT CHANGE UP (ref 22–29)
CREAT SERPL-MCNC: 1.32 MG/DL — HIGH (ref 0.5–1.3)
EOSINOPHIL # BLD AUTO: 0.05 K/UL — SIGNIFICANT CHANGE UP (ref 0–0.5)
EOSINOPHIL NFR BLD AUTO: 0.6 % — SIGNIFICANT CHANGE UP (ref 0–6)
GLUCOSE BLDC GLUCOMTR-MCNC: 125 MG/DL — HIGH (ref 70–99)
GLUCOSE BLDC GLUCOMTR-MCNC: 172 MG/DL — HIGH (ref 70–99)
GLUCOSE BLDC GLUCOMTR-MCNC: 191 MG/DL — HIGH (ref 70–99)
GLUCOSE BLDC GLUCOMTR-MCNC: 291 MG/DL — HIGH (ref 70–99)
GLUCOSE SERPL-MCNC: 197 MG/DL — HIGH (ref 70–99)
HCT VFR BLD CALC: 37.5 % — LOW (ref 39–50)
HGB BLD-MCNC: 10.7 G/DL — LOW (ref 13–17)
IMM GRANULOCYTES NFR BLD AUTO: 0.5 % — SIGNIFICANT CHANGE UP (ref 0–1.5)
LYMPHOCYTES # BLD AUTO: 0.91 K/UL — LOW (ref 1–3.3)
LYMPHOCYTES # BLD AUTO: 11.3 % — LOW (ref 13–44)
MAGNESIUM SERPL-MCNC: 2 MG/DL — SIGNIFICANT CHANGE UP (ref 1.6–2.6)
MCHC RBC-ENTMCNC: 24.5 PG — LOW (ref 27–34)
MCHC RBC-ENTMCNC: 28.5 GM/DL — LOW (ref 32–36)
MCV RBC AUTO: 86 FL — SIGNIFICANT CHANGE UP (ref 80–100)
MONOCYTES # BLD AUTO: 0.68 K/UL — SIGNIFICANT CHANGE UP (ref 0–0.9)
MONOCYTES NFR BLD AUTO: 8.4 % — SIGNIFICANT CHANGE UP (ref 2–14)
NEUTROPHILS # BLD AUTO: 6.37 K/UL — SIGNIFICANT CHANGE UP (ref 1.8–7.4)
NEUTROPHILS NFR BLD AUTO: 78.8 % — HIGH (ref 43–77)
PHOSPHATE SERPL-MCNC: 1.9 MG/DL — LOW (ref 2.4–4.7)
PLATELET # BLD AUTO: 206 K/UL — SIGNIFICANT CHANGE UP (ref 150–400)
POTASSIUM SERPL-MCNC: 4.7 MMOL/L — SIGNIFICANT CHANGE UP (ref 3.5–5.3)
POTASSIUM SERPL-SCNC: 4.7 MMOL/L — SIGNIFICANT CHANGE UP (ref 3.5–5.3)
RBC # BLD: 4.36 M/UL — SIGNIFICANT CHANGE UP (ref 4.2–5.8)
RBC # FLD: 23.2 % — HIGH (ref 10.3–14.5)
SODIUM SERPL-SCNC: 134 MMOL/L — LOW (ref 135–145)
WBC # BLD: 8.08 K/UL — SIGNIFICANT CHANGE UP (ref 3.8–10.5)
WBC # FLD AUTO: 8.08 K/UL — SIGNIFICANT CHANGE UP (ref 3.8–10.5)

## 2020-08-10 PROCEDURE — 99232 SBSQ HOSP IP/OBS MODERATE 35: CPT

## 2020-08-10 PROCEDURE — 93010 ELECTROCARDIOGRAM REPORT: CPT

## 2020-08-10 PROCEDURE — 71045 X-RAY EXAM CHEST 1 VIEW: CPT | Mod: 26,76

## 2020-08-10 RX ORDER — POLYETHYLENE GLYCOL 3350 17 G/17G
17 POWDER, FOR SOLUTION ORAL DAILY
Refills: 0 | Status: DISCONTINUED | OUTPATIENT
Start: 2020-08-10 | End: 2020-08-12

## 2020-08-10 RX ORDER — AMIODARONE HYDROCHLORIDE 400 MG/1
400 TABLET ORAL
Refills: 0 | Status: DISCONTINUED | OUTPATIENT
Start: 2020-08-13 | End: 2020-08-12

## 2020-08-10 RX ORDER — APIXABAN 2.5 MG/1
2.5 TABLET, FILM COATED ORAL EVERY 12 HOURS
Refills: 0 | Status: DISCONTINUED | OUTPATIENT
Start: 2020-08-10 | End: 2020-08-12

## 2020-08-10 RX ORDER — LIDOCAINE HCL 20 MG/ML
20 VIAL (ML) INJECTION ONCE
Refills: 0 | Status: DISCONTINUED | OUTPATIENT
Start: 2020-08-10 | End: 2020-08-12

## 2020-08-10 RX ORDER — AMIODARONE HYDROCHLORIDE 400 MG/1
200 TABLET ORAL DAILY
Refills: 0 | Status: CANCELLED | OUTPATIENT
Start: 2020-08-17 | End: 2020-08-12

## 2020-08-10 RX ORDER — SENNA PLUS 8.6 MG/1
2 TABLET ORAL AT BEDTIME
Refills: 0 | Status: DISCONTINUED | OUTPATIENT
Start: 2020-08-10 | End: 2020-08-12

## 2020-08-10 RX ORDER — AMIODARONE HYDROCHLORIDE 400 MG/1
400 TABLET ORAL EVERY 8 HOURS
Refills: 0 | Status: DISCONTINUED | OUTPATIENT
Start: 2020-08-10 | End: 2020-08-12

## 2020-08-10 RX ADMIN — Medication 650 MILLIGRAM(S): at 22:50

## 2020-08-10 RX ADMIN — AMIODARONE HYDROCHLORIDE 400 MILLIGRAM(S): 400 TABLET ORAL at 22:13

## 2020-08-10 RX ADMIN — HEPARIN SODIUM 1000 UNIT(S)/HR: 5000 INJECTION INTRAVENOUS; SUBCUTANEOUS at 08:36

## 2020-08-10 RX ADMIN — AMIODARONE HYDROCHLORIDE 400 MILLIGRAM(S): 400 TABLET ORAL at 14:33

## 2020-08-10 RX ADMIN — Medication 650 MILLIGRAM(S): at 22:13

## 2020-08-10 RX ADMIN — Medication 62.5 MILLIMOLE(S): at 17:25

## 2020-08-10 RX ADMIN — Medication 81 MILLIGRAM(S): at 11:38

## 2020-08-10 RX ADMIN — POLYETHYLENE GLYCOL 3350 17 GRAM(S): 17 POWDER, FOR SOLUTION ORAL at 14:34

## 2020-08-10 RX ADMIN — ATORVASTATIN CALCIUM 20 MILLIGRAM(S): 80 TABLET, FILM COATED ORAL at 22:13

## 2020-08-10 RX ADMIN — Medication 1: at 17:26

## 2020-08-10 RX ADMIN — PANTOPRAZOLE SODIUM 40 MILLIGRAM(S): 20 TABLET, DELAYED RELEASE ORAL at 05:00

## 2020-08-10 RX ADMIN — Medication 500 MILLIGRAM(S): at 11:38

## 2020-08-10 RX ADMIN — Medication 650 MILLIGRAM(S): at 15:03

## 2020-08-10 RX ADMIN — Medication 650 MILLIGRAM(S): at 14:33

## 2020-08-10 RX ADMIN — Medication 1: at 08:36

## 2020-08-10 RX ADMIN — LATANOPROST 1 DROP(S): 0.05 SOLUTION/ DROPS OPHTHALMIC; TOPICAL at 22:14

## 2020-08-10 RX ADMIN — Medication 1 TABLET(S): at 11:38

## 2020-08-10 RX ADMIN — Medication 1 APPLICATION(S): at 05:00

## 2020-08-10 RX ADMIN — SENNA PLUS 2 TABLET(S): 8.6 TABLET ORAL at 22:19

## 2020-08-10 RX ADMIN — Medication 10 MILLIGRAM(S): at 05:00

## 2020-08-10 RX ADMIN — Medication 25 MILLIGRAM(S): at 05:00

## 2020-08-10 RX ADMIN — APIXABAN 2.5 MILLIGRAM(S): 2.5 TABLET, FILM COATED ORAL at 14:33

## 2020-08-10 RX ADMIN — Medication 3: at 12:31

## 2020-08-10 NOTE — PROGRESS NOTE ADULT - SUBJECTIVE AND OBJECTIVE BOX
CHIEF COMPLAINT/INTERVAL HISTORY:    Patient is a 87y old  Male who presents with a chief complaint of SOB, CP, possible ACS , CHF (08 Aug 2020 12:04)      HPI:  87y old  Male with PMH/ HFpEF, CAD, HTN, HLD, DM type 2 on oral hypoglycemics, CKD stage III; rib fractures complicated by L-sided pleural effusion, requiring thoracentesis ~2 years ago; B/L pleural effusions, s/p L thoracentesis- 01/02/20, transudative, chronic lightheadedness, GERD, who presents with a chief complaint of MEJÍA and gait instability. Pt states that since about 2 mos ago he has been feeling short of breath, more with exertion -- walking around his apartment or with bending to put his shoes on. Pt states that today his visiting nurse became concerned about his breathing, him getting "winded" with minimal exertion and abnormal lung sounds. Pt states that he has been feeling the same since about 2 mos ago. Pt states that sometimes he feels chest burning if he eats late or he eats heavy meal, especially meat. pt states that he lost about 10 lbs since about 2 mos ago due to has been taking Abx for L heal ulcer and his appetite has been poor. Pt states that his doctor discontinued BP medications 2/2 low BP. Pt states that he has been drinking 0.5 L bottles of water-- about 2-3 every day. Pt denies any CP/dizziness/diaphoresis/F/C/cough/abdominal pain/sick contacts/other complaints.   Above HPI per cardio  read and agreed with verified with patient -In Addition , he was started on Zinc per his PCP 2 weeks ago when he visited for SOB , he denied any edema or orthopnea , denied fever or cough or sick contact   States he is anxious about the cardiac cath and i explained the cath procedure , discussed that he might need thoracentesis with bilateral pleural effusions (04 Aug 2020 13:08)      SUBJECTIVE & OBJECTIVE/ ROS: Pt seen and examined at bedside. Pt feels well with improvement in SOB. Ectopy on tele. s/p chest tube removal.    No chest pain, palpitations, light headedness/dizziness, cough, fevers/chills, abdominal pain, n/v, diarrhea/constipation, dysuria or increased urinary frequency.     ICU Vital Signs Last 24 Hrs  T(C): 36.7 (08 Aug 2020 09:25), Max: 36.9 (08 Aug 2020 05:40)  T(F): 98 (08 Aug 2020 09:25), Max: 98.5 (08 Aug 2020 05:40)  HR: 67 (08 Aug 2020 13:25) (67 - 95)  BP: 122/78 (08 Aug 2020 13:25) (103/65 - 125/82)  BP(mean): --  ABP: --  ABP(mean): --  RR: 18 (08 Aug 2020 09:25) (17 - 19)  SpO2: 96% (08 Aug 2020 09:25) (95% - 100%)        MEDICATIONS  (STANDING):  aspirin  chewable 81 milliGRAM(s) Oral daily  atorvastatin 20 milliGRAM(s) Oral at bedtime  collagenase Ointment 1 Application(s) Topical daily  Dakins Solution - 1/2 Strength 1 Application(s) Topical daily  dextrose 5%. 1000 milliLiter(s) (50 mL/Hr) IV Continuous <Continuous>  dextrose 50% Injectable 12.5 Gram(s) IV Push once  dextrose 50% Injectable 25 Gram(s) IV Push once  dextrose 50% Injectable 25 Gram(s) IV Push once  heparin  Infusion. 900 Unit(s)/Hr (9 mL/Hr) IV Continuous <Continuous>  insulin lispro (HumaLOG) corrective regimen sliding scale   SubCutaneous three times a day before meals  insulin lispro (HumaLOG) corrective regimen sliding scale   SubCutaneous at bedtime  latanoprost 0.005% Ophthalmic Solution 1 Drop(s) Both EYES at bedtime  lidocaine 1% Injectable 10 milliLiter(s) Local Injection once  metoprolol succinate ER 25 milliGRAM(s) Oral daily  pantoprazole    Tablet 40 milliGRAM(s) Oral before breakfast  povidone iodine 10% Solution 1 Application(s) Topical two times a day  torsemide 10 milliGRAM(s) Oral daily    MEDICATIONS  (PRN):  acetaminophen   Tablet .. 650 milliGRAM(s) Oral every 6 hours PRN Temp greater or equal to 38C (100.4F), Mild Pain (1 - 3), Moderate Pain (4 - 6)  ALPRAZolam 0.25 milliGRAM(s) Oral daily PRN anxiety  dextrose 40% Gel 15 Gram(s) Oral once PRN Blood Glucose LESS THAN 70 milliGRAM(s)/deciliter  glucagon  Injectable 1 milliGRAM(s) IntraMuscular once PRN Glucose LESS THAN 70 milligrams/deciliter  heparin   Injectable 4500 Unit(s) IV Push every 6 hours PRN For aPTT less than 40  heparin   Injectable 2000 Unit(s) IV Push every 6 hours PRN For aPTT between 40 - 57      LABS:                        9.9    7.30  )-----------( 202      ( 08 Aug 2020 06:01 )             35.1     08-08    135  |  96<L>  |  33.0<H>  ----------------------------<  163<H>  4.5   |  27.0  |  1.42<H>    Ca    8.3<L>      08 Aug 2020 06:01  Mg     2.0     08-07      PT/INR - ( 08 Aug 2020 06:01 )   PT: 13.5 sec;   INR: 1.17 ratio         PTT - ( 08 Aug 2020 06:01 )  PTT:55.7 sec      CAPILLARY BLOOD GLUCOSE      POCT Blood Glucose.: 190 mg/dL (08 Aug 2020 12:22)  POCT Blood Glucose.: 158 mg/dL (08 Aug 2020 08:32)  POCT Blood Glucose.: 278 mg/dL (07 Aug 2020 21:52)  POCT Blood Glucose.: 263 mg/dL (07 Aug 2020 17:07)      RECENT CULTURES:        08-07-20 @ 07:01  -  08-08-20 @ 07:00  --------------------------------------------------------  IN: 1227 mL / OUT: 485 mL / NET: 742 mL    08-08-20 @ 07:01  -  08-08-20 @ 15:03  --------------------------------------------------------  IN: 50 mL / OUT: 0 mL / NET: 50 mL          RADIOLOGY & ADDITIONAL TESTS:      PHYSICAL EXAM:    GENERAL: frail, elderly, NAD  HEAD: Atraumatic, Normocephalic  EYES: EOMI, PERRLA, conjunctiva and sclera clear  ENMT: Moist mucous membranes  NECK: Supple, No JVD  NERVOUS SYSTEM:  Alert & Oriented X3, Motor Strength 5/5 B/L upper and lower extremities; DTRs 2+ intact and symmetric  CHEST/LUNG: Clear to auscultation bilaterally; No rales, rhonchi, wheezing, or rubs, + right sided chest tube  HEART: Regular rate and rhythm; No murmurs, rubs, or gallops  ABDOMEN: Soft, Nontender, Nondistended; Bowel sounds present  EXTREMITIES:  2+ Peripheral Pulses, No clubbing, cyanosis, or edema CHIEF COMPLAINT/INTERVAL HISTORY:    Patient is a 87y old  Male who presents with a chief complaint of SOB, CP, possible ACS , CHF (08 Aug 2020 12:04)      HPI:  87y old  Male with PMH/ HFpEF, CAD, HTN, HLD, DM type 2 on oral hypoglycemics, CKD stage III; rib fractures complicated by L-sided pleural effusion, requiring thoracentesis ~2 years ago; B/L pleural effusions, s/p L thoracentesis- 01/02/20, transudative, chronic lightheadedness, GERD, who presents with a chief complaint of MEJÍA and gait instability. Pt states that since about 2 mos ago he has been feeling short of breath, more with exertion -- walking around his apartment or with bending to put his shoes on. Pt states that today his visiting nurse became concerned about his breathing, him getting "winded" with minimal exertion and abnormal lung sounds. Pt states that he has been feeling the same since about 2 mos ago. Pt states that sometimes he feels chest burning if he eats late or he eats heavy meal, especially meat. pt states that he lost about 10 lbs since about 2 mos ago due to has been taking Abx for L heal ulcer and his appetite has been poor. Pt states that his doctor discontinued BP medications 2/2 low BP. Pt states that he has been drinking 0.5 L bottles of water-- about 2-3 every day. Pt denies any CP/dizziness/diaphoresis/F/C/cough/abdominal pain/sick contacts/other complaints.   Above HPI per cardio  read and agreed with verified with patient -In Addition , he was started on Zinc per his PCP 2 weeks ago when he visited for SOB , he denied any edema or orthopnea , denied fever or cough or sick contact   States he is anxious about the cardiac cath and i explained the cath procedure , discussed that he might need thoracentesis with bilateral pleural effusions (04 Aug 2020 13:08)      SUBJECTIVE & OBJECTIVE/ ROS: Pt seen and examined at bedside. Pt feels well with improvement in SOB. Ectopy on tele. s/p chest tube removal.    No chest pain, palpitations, light headedness/dizziness, cough, fevers/chills, abdominal pain, n/v, diarrhea/constipation, dysuria or increased urinary frequency.     ICU Vital Signs Last 24 Hrs  T(C): 36.7 (08 Aug 2020 09:25), Max: 36.9 (08 Aug 2020 05:40)  T(F): 98 (08 Aug 2020 09:25), Max: 98.5 (08 Aug 2020 05:40)  HR: 67 (08 Aug 2020 13:25) (67 - 95)  BP: 122/78 (08 Aug 2020 13:25) (103/65 - 125/82)  BP(mean): --  ABP: --  ABP(mean): --  RR: 18 (08 Aug 2020 09:25) (17 - 19)  SpO2: 96% (08 Aug 2020 09:25) (95% - 100%)        MEDICATIONS  (STANDING):  aspirin  chewable 81 milliGRAM(s) Oral daily  atorvastatin 20 milliGRAM(s) Oral at bedtime  collagenase Ointment 1 Application(s) Topical daily  Dakins Solution - 1/2 Strength 1 Application(s) Topical daily  dextrose 5%. 1000 milliLiter(s) (50 mL/Hr) IV Continuous <Continuous>  dextrose 50% Injectable 12.5 Gram(s) IV Push once  dextrose 50% Injectable 25 Gram(s) IV Push once  dextrose 50% Injectable 25 Gram(s) IV Push once  heparin  Infusion. 900 Unit(s)/Hr (9 mL/Hr) IV Continuous <Continuous>  insulin lispro (HumaLOG) corrective regimen sliding scale   SubCutaneous three times a day before meals  insulin lispro (HumaLOG) corrective regimen sliding scale   SubCutaneous at bedtime  latanoprost 0.005% Ophthalmic Solution 1 Drop(s) Both EYES at bedtime  lidocaine 1% Injectable 10 milliLiter(s) Local Injection once  metoprolol succinate ER 25 milliGRAM(s) Oral daily  pantoprazole    Tablet 40 milliGRAM(s) Oral before breakfast  povidone iodine 10% Solution 1 Application(s) Topical two times a day  torsemide 10 milliGRAM(s) Oral daily    MEDICATIONS  (PRN):  acetaminophen   Tablet .. 650 milliGRAM(s) Oral every 6 hours PRN Temp greater or equal to 38C (100.4F), Mild Pain (1 - 3), Moderate Pain (4 - 6)  ALPRAZolam 0.25 milliGRAM(s) Oral daily PRN anxiety  dextrose 40% Gel 15 Gram(s) Oral once PRN Blood Glucose LESS THAN 70 milliGRAM(s)/deciliter  glucagon  Injectable 1 milliGRAM(s) IntraMuscular once PRN Glucose LESS THAN 70 milligrams/deciliter  heparin   Injectable 4500 Unit(s) IV Push every 6 hours PRN For aPTT less than 40  heparin   Injectable 2000 Unit(s) IV Push every 6 hours PRN For aPTT between 40 - 57      LABS:                        9.9    7.30  )-----------( 202      ( 08 Aug 2020 06:01 )             35.1     08-08    135  |  96<L>  |  33.0<H>  ----------------------------<  163<H>  4.5   |  27.0  |  1.42<H>    Ca    8.3<L>      08 Aug 2020 06:01  Mg     2.0     08-07      PT/INR - ( 08 Aug 2020 06:01 )   PT: 13.5 sec;   INR: 1.17 ratio         PTT - ( 08 Aug 2020 06:01 )  PTT:55.7 sec      CAPILLARY BLOOD GLUCOSE      POCT Blood Glucose.: 190 mg/dL (08 Aug 2020 12:22)  POCT Blood Glucose.: 158 mg/dL (08 Aug 2020 08:32)  POCT Blood Glucose.: 278 mg/dL (07 Aug 2020 21:52)  POCT Blood Glucose.: 263 mg/dL (07 Aug 2020 17:07)      RECENT CULTURES:        08-07-20 @ 07:01  -  08-08-20 @ 07:00  --------------------------------------------------------  IN: 1227 mL / OUT: 485 mL / NET: 742 mL    08-08-20 @ 07:01  -  08-08-20 @ 15:03  --------------------------------------------------------  IN: 50 mL / OUT: 0 mL / NET: 50 mL          RADIOLOGY & ADDITIONAL TESTS:      PHYSICAL EXAM:    GENERAL: frail, elderly, NAD  HEAD: Atraumatic, Normocephalic  EYES: EOMI, PERRLA, conjunctiva and sclera clear  ENMT: Moist mucous membranes  NECK: Supple, No JVD  NERVOUS SYSTEM:  Alert & Oriented X3, Motor Strength 5/5 B/L upper and lower extremities; DTRs 2+ intact and symmetric  CHEST/LUNG: Clear to auscultation bilaterally; No rales, rhonchi, wheezing, or rubs, no bleeding from dressing site  HEART: Regular rate and rhythm; No murmurs, rubs, or gallops  ABDOMEN: Soft, Nontender, Nondistended; Bowel sounds present  EXTREMITIES:  2+ Peripheral Pulses, No clubbing, cyanosis, or edema

## 2020-08-10 NOTE — PROGRESS NOTE ADULT - PROBLEM SELECTOR PROBLEM 1
Pleural effusion
Pleural effusion
Pleural effusion, bilateral
Pleural effusion, bilateral
Pleural effusion
Pleural effusion

## 2020-08-10 NOTE — PROGRESS NOTE ADULT - SUBJECTIVE AND OBJECTIVE BOX
Subjective:  "Im uncomfortable, everything is hurting, hopefully I can lie down when that  tube is out'  OOB chair      V/S  T(C): 36.5 (08-10-20 @ 10:04), Max: 36.8 (08-09-20 @ 21:24)  HR: 108 (08-10-20 @ 10:04) (81 - 108)  BP: 97/61 (08-10-20 @ 10:04) (97/61 - 123/68)  RR: 18 (08-10-20 @ 10:04) (16 - 18)  SpO2: 98% (08-10-20 @ 10:04) (95% - 98%)                                                Tele: ST 100s    CHEST TUBE:  R posterior pigtail>waterseal  30 ml overnite                       AIR LEAKS:  [ ] YES [ x] NO      MEDICATIONS  (STANDING):  ascorbic acid 500 milliGRAM(s) Oral daily  aspirin  chewable 81 milliGRAM(s) Oral daily  atorvastatin 20 milliGRAM(s) Oral at bedtime  collagenase Ointment 1 Application(s) Topical daily  Dakins Solution - 1/2 Strength 1 Application(s) Topical daily  dextrose 5%. 1000 milliLiter(s) (50 mL/Hr) IV Continuous <Continuous>  dextrose 50% Injectable 12.5 Gram(s) IV Push once  dextrose 50% Injectable 25 Gram(s) IV Push once  dextrose 50% Injectable 25 Gram(s) IV Push once  heparin  Infusion. 900 Unit(s)/Hr (9 mL/Hr) IV Continuous <Continuous>  insulin lispro (HumaLOG) corrective regimen sliding scale   SubCutaneous three times a day before meals  insulin lispro (HumaLOG) corrective regimen sliding scale   SubCutaneous at bedtime  latanoprost 0.005% Ophthalmic Solution 1 Drop(s) Both EYES at bedtime  lidocaine 1% Injectable 10 milliLiter(s) Local Injection once  metoprolol succinate ER 25 milliGRAM(s) Oral daily  multivitamin 1 Tablet(s) Oral daily  pantoprazole    Tablet 40 milliGRAM(s) Oral before breakfast  povidone iodine 10% Solution 1 Application(s) Topical two times a day  sodium phosphate IVPB 15 milliMole(s) IV Intermittent once  torsemide 10 milliGRAM(s) Oral daily      08-10    134<L>  |  94<L>  |  31.0<H>  ----------------------------<  197<H>  4.7   |  27.0  |  1.32<H>    Ca    8.4<L>      10 Aug 2020 07:00  Phos  1.9     08-10  Mg     2.0     08-10                                 10.7   8.08  )-----------( 206      ( 10 Aug 2020 07:00 )             37.5        PTT - ( 10 Aug 2020 07:00 )  PTT:58.8 sec         CAPILLARY BLOOD GLUCOSE      POCT Blood Glucose.: 191 mg/dL (10 Aug 2020 08:23)  POCT Blood Glucose.: 203 mg/dL (09 Aug 2020 21:46)  POCT Blood Glucose.: 200 mg/dL (09 Aug 2020 21:27)  POCT Blood Glucose.: 242 mg/dL (09 Aug 2020 16:51)  POCT Blood Glucose.: 219 mg/dL (09 Aug 2020 12:44)           CXR: < from: Xray Chest 1 View- PORTABLE-Routine (08.09.20 @ 06:06) >  FINDINGS:  Pigtail catheter right base  HEART: normal in size  LUNGS: Left effusion/infiltrate.  BONES: Osteopenia. Degenerative changes.        Physical Exam:    General: Thin NAD    Neuro: AxO x3, non-focal, SAPP    Cardiac: S1S2, no murmurs    Pulm: CTA b/l, no wheezing or rales R posterior pigtail> serous fluid draining    Abdomen: Soft, NT, ND, normoactive BS    Peripheral: +DP pulses b/l, no peripheral edema      indwelling fall>marquise urine        PAST MEDICAL & SURGICAL HISTORY:  DM (diabetes mellitus)  HLD (hyperlipidemia)  HTN (hypertension)  PVCs (premature ventricular contractions)  CHF (congestive heart failure)  H/O right inguinal hernia repair

## 2020-08-10 NOTE — CHART NOTE - NSCHARTNOTEFT_GEN_A_CORE
Addendum for note of Dr HAHN'S 7/25/2020  Excisional debridement of left heel ulcer upto the level of fascia and muscle, not including bone.

## 2020-08-10 NOTE — PROGRESS NOTE ADULT - ASSESSMENT
87y old  Male with PMH/ HFpEF, CAD, HTN, HLD, DM type 2 on oral hypoglycemics, CKD stage III; rib fractures complicated by L-sided pleural effusion requiring thoracentesis ~2 years ago; B/L pleural effusions, s/p L thoracentesis- 01/02/20, transudative, chronic lightheadedness, GERD, who presents with dyspnea and CHF.    Dyspnea likely multifactorial - Bilateral pleural effusions , acute on chronic systolic and diastolic CHF, Anemia   - improved s/p R chest tube. Spoke to IR, loculated L sided effusions, will defer L thoracocentesis at this time and monitor, R pleural effusion.  780cc drained in 24h.  - Lasix BID changed to Torsemide   -Transudate on fluid analysis  - effusion cytology negative for malignant cells  - thoracic surgery following, will remove drain when output minimal, daily cxr    Low output, Maintain chest tube to water seal today   Follow up daily output  Repeat CXR in AM while chest tube is in place  s/p chest tube removal by CTS. No plans for pleurex as pt's right pleural effusion is not recurrent as per CTS   TTE done showed Left ventricular ejection fraction, by visual estimation, is 45 to 50%, Mildly decreased global left ventricular systolic function, Spectral Doppler shows pseudonormal pattern of left ventricular myocardial filling (Grade II diastolic dysfunction), Large pleural effusion in both left and right lateral regions, Estimated pulmonary artery systolic pressure is 55.3 mmHg assuming a right atrial pressure of 8 mmHg, which is consistent with moderate pulmonary hypertension.        NSVT-  Keep K > 4 & Mg > 2  Monitor on tele  Staretd amiodarone per Dr. Cardenas / tyler start 400 q 8 x 3days/ then 400mg BID x 3days then 200mg daily  Change IV heparin to Eliquis 2.5 Bid       Acute on chronic systolic & diastolic CHF with negative troponin and elevated BNP   - s/p Echo, EF 45-50%  - Lasix BID changed to Torsemide   - holding ACEI given soft BPs  - s/p cardiac cath, non-obstructive CAD    New onset Afib  - CHADSVASC 5  - started metoprolol succinate 25mg QD as per cards  -Change IV heparin to Eliquis 2.5 Bid   TSH is 6.42, will recheck, patient is s/p cath showed Overall non-obstructive CAD. as per them Would maximize  medical therapy for diastolic CHF and CAD.      L heel ulcer  - pt followed outpatient by Dr Erazo, who recommended betadine BID  - pt due for outpatient bone culture as per Dr Erazo  Xrays ordered - pending  ABIS ordered - pending  Applied Santyl/DSD  Wound culture taken - pending results  Recommend offloading to LLE while in bed  Podiatry following    Hypomagnesemia  - repleted, goal >2.0, will monitor     T2DM  - HbA1c 6.3% , goal <8.0%  - d/c sliding scale and monitor with daily labs  - DASH diet    Weight loss and early satiety  - could be CHF related but may need GI workup. 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     Constipation- bowel regimen started. No abd jacy, N/V.      DVT PPX- on eliquis    Dispo: D/c in am if stable on amio. will most likely need JANAE/ rehab

## 2020-08-10 NOTE — PROGRESS NOTE ADULT - PROBLEM SELECTOR PLAN 1
D/w Dr Leggett maintain ct to waterseal today will f/u output and cxr in AM.
1. LEFT pleural effusion was evaluated by IR and will not be drained at this time as it is loculated.   2. RIGHT pleural effusion- Maintain chest tube to water seal today   Follow up daily output, Repeat CXR in AM while chest tube is in place  Medical management- primary team continues to diuresis patient, creatinine stable  Plan to D/C chest tube tomorrow 8/10 if output remains low
1. LEFT pleural effusion was evaluated by IR and will not be drained at this time as it is loculated.   2. RIGHT pleural effusion- R pigtail removed  CXR done > no PTX  Please reconsult if needed
Maintain chest tube to water seal today   Follow up daily output  Repeat CXR in AM while chest tube is in place  Medical management- primary team continues to diuresis patient
Right chest drain placed - continues to drain. Monitor output. To remove when output minimal.   Cardio treating primary cause  Continue diuretics  Gram stain negative.   Post-pigtail chest xray shows good placement. Formal read pending.
Right chest drain placed - continues to drain. Monitor output. To remove when output minimal. Output zero overnight, however upon examination, tube was kinked under patient. I unkinked the chest tube and it continued to drain serous fluid.  Cardio treating primary cause  Continue diuretics  Gram stain negative.   Post-pigtail chest xray shows good placement.     Will continue to monitor output and likely remove tomorrow AM. D/w Dr. Leggett.

## 2020-08-10 NOTE — PROGRESS NOTE ADULT - ASSESSMENT
87 year old male with PMHx of chronic diastolic heart failure, CAD, HTN, HLD, DM type 2 on oral hypoglycemics, CKD stage III;  L-sided pleural effusion requiring thoracentesis twice in the past with transudative fluid, GERD, who presented on 8/3/20 with dyspnea and CHF. Thoracic surgery was consulted 8/4 for right pleural effusion at which time a right pigtail catheter was placed. Currently, patient remains with high output from chest tube.     Cardiology is requesting pleurx, which was discussed further with Thoracic surgeon Dr. Mack. Per chart review, patient hasn't had prior thoracentesis of RIGHT pleural effusions, only left pleural effusions. Decreased outout from right pigtail over the last 24 hours and patient is tolerating medical management. CXR this AM shows resolution of right pleural effusion. At this time, plan to monitor output from chest tube until tomorrow 8/10 and d/c if output remains low. NO PLAN FOR PLEURX CATHETER AT THIS TIME.   8/10 Pigtail removed

## 2020-08-10 NOTE — PROGRESS NOTE ADULT - SUBJECTIVE AND OBJECTIVE BOX
Podiatry interval HPI: Pt was seen by podiatry this afternoon. Bone biopsy was done at bedside today as per Dr Herlinda Escamilla's  recommendation.     Patient is a 87y old  Male who presents with a chief complaint of SOB, CP,m possible ACS , CHF (06 Aug 2020 15:47)      HPI:  87y old  Male with PMH/ HFpEF, CAD, HTN, HLD, DM type 2 on oral hypoglycemics, CKD stage III; rib fractures complicated by L-sided pleural effusion, requiring thoracentesis ~2 years ago; B/L pleural effusions, s/p L thoracentesis- 01/02/20, transudative, chronic lightheadedness, GERD, who presents with a chief complaint of MEJÍA and gait instability. Pt states that since about 2 mos ago he has been feeling short of breath, more with exertion -- walking around his apartment or with bending to put his shoes on. Pt states that today his visiting nurse became concerned about his breathing, him getting "winded" with minimal exertion and abnormal lung sounds. Pt states that he has been feeling the same since about 2 mos ago. Pt states that sometimes he feels chest burning if he eats late or he eats heavy meal, especially meat. pt states that he lost about 10 lbs since about 2 mos ago due to has been taking Abx for L heal ulcer and his appetite has been poor. Pt states that his doctor discontinued BP medications 2/2 low BP. Pt states that he has been drinking 0.5 L bottles of water-- about 2-3 every day. Pt denies any CP/dizziness/diaphoresis/F/C/cough/abdominal pain/sick contacts/other complaints.   Above HPI per cardio  read and agreed with verified with patient -In Addition , he was started on Zinc per his PCP 2 weeks ago when he visited for SOB , he denied any edema or orthopnea , denied fever or cough or sick contact   States he is anxious about the cardiac cath and i explained the cath procedure , discussed that he might need thoracentesis with bilateral pleural effusions.    History as above on admission. Podiatry consulted for Left heel ulcer. Pt states he has been following up with Dr. Erazo as outpatient and has been getting weekly debridements. States he had a bone biopsy done last week, was supposed to get the results this week but missed his appt due to being admitted. Denies f/c/n/v.     PAST MEDICAL & SURGICAL HISTORY:  DM (diabetes mellitus)  HLD (hyperlipidemia)  HTN (hypertension)  PVCs (premature ventricular contractions)  CHF (congestive heart failure)  H/O right inguinal hernia repair    Allergies: No Known Allergies    Medications: acetaminophen   Tablet .. 650 milliGRAM(s) Oral every 6 hours PRN  ALPRAZolam 0.25 milliGRAM(s) Oral daily PRN  aspirin  chewable 81 milliGRAM(s) Oral daily  atorvastatin 20 milliGRAM(s) Oral at bedtime  heparin   Injectable 4500 Unit(s) IV Push every 6 hours PRN  heparin   Injectable 2000 Unit(s) IV Push every 6 hours PRN  heparin  Infusion.  Unit(s)/Hr IV Continuous <Continuous>  lidocaine 1% Injectable 10 milliLiter(s) Local Injection once  metoprolol succinate ER 25 milliGRAM(s) Oral daily  pantoprazole    Tablet 40 milliGRAM(s) Oral before breakfast  povidone iodine 10% Solution 1 Application(s) Topical two times a day  torsemide 10 milliGRAM(s) Oral daily    FH:FAMILY HISTORY:  FH: CAD (coronary artery disease): Mother                              10.7   8.08  )-----------( 206      ( 10 Aug 2020 07:00 )             37.5   08-10    134<L>  |  94<L>  |  31.0<H>  ----------------------------<  197<H>  4.7   |  27.0  |  1.32<H>    Ca    8.4<L>      10 Aug 2020 07:00  Phos  1.9     08-10  Mg     2.0     08-10    Vital Signs Last 24 Hrs  T(C): 36.8 (10 Aug 2020 16:00), Max: 36.8 (09 Aug 2020 21:24)  T(F): 98.3 (10 Aug 2020 16:00), Max: 98.3 (09 Aug 2020 21:24)  HR: 99 (10 Aug 2020 16:00) (92 - 108)  BP: 83/51 (10 Aug 2020 16:00) (83/51 - 99/71)  BP(mean): --  RR: 18 (10 Aug 2020 16:00) (16 - 18)  SpO2: 99% (10 Aug 2020 16:00) (98% - 99%)          ROS  REVIEW OF SYSTEMS:  CONSTITUTIONAL: No fever, weight loss, or fatigue  RESPIRATORY: No cough, wheezing, chills or hemoptysis; No shortness of breath  CARDIOVASCULAR: No chest pain, palpitations, dizziness, or leg swelling  SKIN: Left  lateral heel ulcer    PHYSICAL EXAM  LE Focused:    Vasc:  DP/PT pulses lightly palpable, cap refill <5 secs  Derm: Left lateral heel ulcer measuring ~3.0 cm x 1.0 cm x 1.0 cm with fibrogranular base and necrotic borders. No drainage noted, probes  to bone, foul smelling   Neuro: Light touch sensation grossly intact  MSK: Pain on palpation to Left heel ulcer.         Cultures: taken    A: Left heel ulcer    P:  Patient evaluated, chart revie< from: Xray Foot AP + Lateral + Oblique, Left (08.07.20 @ 14:19) >     EXAM:  FOOT-LEFT                          PROCEDURE DATE:  08/07/2020          INTERPRETATION:  TECHNIQUE: Three views left foot    COMPARISON: None.    CLINICAL HISTORY: Left heel ulcer.    FINDINGS:    Frontal, lateral and oblique views of the left foot shows no evidence for acute fracture, subluxation or dislocation. Mild vascular calcifications. Small heel spurs. Mild degenerative changes of the toes and midfoot. Mild diffuse soft tissue swelling surrounding the forefoot. Mild soft tissue swelling surrounding the calcaneus. Consider CT or MRI as clinically warranted.    IMPRESSION:  Mild diffuse soft tissue swelling surrounding the forefoot and calcaneus. No evidence of osteomyelitis. MRI is more sensitive.              RADHA EASTON M.D., ATTENDING RADIOLOGIST  This document has been electronically signed. Aug  7 2020  6:25PM        < end of copied text >    Plan:  Discussed diagnosis and treatment plan with patient  Xrays reviewed: no OM  ABIS ordered - reviewed  Bedside bone biopsy done as per Dr Herlinda Escamilla's recomendation. Pt had a previous bone biopsy done few weeks ago at wound care centre and results were unobtainable.  Consent was obtained and placed in chart.  Pt's left lateral heel area was injected with 20 cc of 1% lidocaine after prepping area with betadine.   bone biopsy was taken first through clean area  closed to ulcer to avoid contamination by using Jenshedee needle. second sample was taken thru ulcer area by using jemshedee needle. both samples were sent to lab for bone culture. Pt tolerated procedure well.  Applied betadine/DSD.  Recommend offloading to LLE while in bed  Patient stable for discharge from podiatry stand point and to follow at wound care centre.  Discussed with Dr. Patiño    wound care instructions:  clean ulcer with Dakin solution  Apply santyl to gauze and secure gauze at left lateral heel ulcer area with saira and tape.  change every other day.  keep dressing dry, clean and intact

## 2020-08-10 NOTE — PROGRESS NOTE ADULT - SUBJECTIVE AND OBJECTIVE BOX
SUBJECTIVE:  Cardiology NP F/U note:  Dr. Cardenas F/U Pleural Effusions/ AF  denies complaints of chest pain/sob/dizziness/palps  "feels Tired" Weak  appetite poor"not eating well"  OOB with PT      	  MEDICATIONS  (STANDING):  ascorbic acid 500 milliGRAM(s) Oral daily  aspirin  chewable 81 milliGRAM(s) Oral daily  atorvastatin 20 milliGRAM(s) Oral at bedtime  collagenase Ointment 1 Application(s) Topical daily  Dakins Solution - 1/2 Strength 1 Application(s) Topical daily  dextrose 5%. 1000 milliLiter(s) IV Continuous <Continuous>  dextrose 50% Injectable 12.5 Gram(s) IV Push once  dextrose 50% Injectable 25 Gram(s) IV Push once  dextrose 50% Injectable 25 Gram(s) IV Push once  heparin  Infusion. 900 Unit(s)/Hr IV Continuous <Continuous>  insulin lispro (HumaLOG) corrective regimen sliding scale   SubCutaneous three times a day before meals  insulin lispro (HumaLOG) corrective regimen sliding scale   SubCutaneous at bedtime  latanoprost 0.005% Ophthalmic Solution 1 Drop(s) Both EYES at bedtime  lidocaine 1% Injectable 10 milliLiter(s) Local Injection once  metoprolol succinate ER 25 milliGRAM(s) Oral daily  multivitamin 1 Tablet(s) Oral daily  pantoprazole    Tablet 40 milliGRAM(s) Oral before breakfast  povidone iodine 10% Solution 1 Application(s) Topical two times a day  sodium phosphate IVPB 15 milliMole(s) IV Intermittent once  torsemide 10 milliGRAM(s) Oral daily    MEDICATIONS  (PRN):  acetaminophen   Tablet .. 650 milliGRAM(s) Oral every 6 hours PRN Temp greater or equal to 38C (100.4F), Mild Pain (1 - 3), Moderate Pain (4 - 6)  ALPRAZolam 0.25 milliGRAM(s) Oral daily PRN anxiety  dextrose 40% Gel 15 Gram(s) Oral once PRN Blood Glucose LESS THAN 70 milliGRAM(s)/deciliter  glucagon  Injectable 1 milliGRAM(s) IntraMuscular once PRN Glucose LESS THAN 70 milligrams/deciliter  heparin   Injectable 4500 Unit(s) IV Push every 6 hours PRN For aPTT less than 40  heparin   Injectable 2000 Unit(s) IV Push every 6 hours PRN For aPTT between 40 - 57  oxycodone    5 mG/acetaminophen 325 mG 1 Tablet(s) Oral every 4 hours PRN Moderate Pain (4 - 6)      PHYSICAL EXAM:    T(C): 36.5 (08-10-20 @ 10:04), Max: 36.8 (20 @ 21:24)  HR: 108 (08-10-20 @ 10:04) (81 - 108)  BP: 97/61 (08-10-20 @ 10:04) (97/61 - 123/68)  RR: 18 (08-10-20 @ 10:04) (16 - 18)  SpO2: 98% (08-10-20 @ 10:04) (95% - 98%)  Wt(kg): --    I&O's Summary    09 Aug 2020 07:01  -  10 Aug 2020 07:00  --------------------------------------------------------  IN: 170 mL / OUT: 656 mL / NET: -486 mL        Daily     Daily Weight in k.2 (10 Aug 2020 04:46)    Appearance: weak, frail Cachetic, male NAD  Cardiovascular: Normal S1 S2 IRREG 60's + PVC , No JVD, No murmurs, No edema  Respiratory: diminished but clear	  Gastrointestinal:  Soft, Non-tender, + BS	  Skin: warm and dry  Neurologic: Non-focal  Extremities: Normal range of motion,:  Vascular: Peripheral pulses palpable 2+ bilaterally    TELEMETRY: AF 60's woth PVC.  couplets	    ECG:  	  RADIOLOGY:   DIAGNOSTIC TESTING:  [ ] Echocardiogram:  < from: TTE Echo Complete w/o Contrast w/ Doppler (20 @ 08:49) >   Left ventricular ejection fraction, by visual estimation, is 45 to 50%.   2. Mildly decreased global left ventricular systolic function.   3. Mildly increased LV wall thickness.   4. Normal left ventricular internal cavity size.   5. Spectral Doppler shows pseudonormal pattern of left ventricular myocardial filling (Grade II diastolic dysfunction).   6. Large pleural effusion in both left and right lateral regions.   7. There is no evidence of pericardial effusion.   8. Mild mitral valve regurgitation.   9. Thickening and calcification of the anterior and posterior mitral valve leaflets.  10. Moderate tricuspid regurgitation.  11. Estimated pulmonary artery systolic pressure is 55.3 mmHg assuming a right atrial pressure of 8 mmHg, which is consistent with moderate pulmonary hypertension.  12. Mitral valve mean gradient is 4.0 mmHg consistent with mild mitral stenosis.  13. The aortic valve mean gradient is 3.0 mmHg consistent with normally opening aortic valve.      [ ]  Catheterization:  < from: Cardiac Cath Lab - Adult (20 @ 16:42) >  VENTRICLES: No left ventriculogram was performed.  CORONARY VESSELS: The coronary circulation is right dominant.  LM:   --  LM: The vessel was normal sized. Angiography showed minor luminal  irregularities with no flow limiting lesions.  LAD:   --  LAD: The vessel was normal sized and moderately calcified.  Angiography showed moderate atherosclerosis.  --  Mid LAD: There was a discrete 50 % stenosis.  CX:   --  Circumflex: The vessel was large sized (dominant).  --  Ostial circumflex: There was a discrete 40 % stenosis.  --  Distal circumflex: There was a discrete 60 % stenosis. It does not  appear amenable to intervention.  --  OM1: The vessel was normal sized. There was a diffuse 40 % stenosis.  RCA:   --  RCA: Normal. The vessel was very small sized.  COMPLICATIONS: No complications occurred during the cath lab visit.  DIAGNOSTIC RECOMMENDATIONS: Overall non-obstructive CAD. Would maximize  medical therapy for diastolic CHF and CAD.  Prepared and signed by                                    10.7   8.08  )-----------( 206      ( 10 Aug 2020 07:00 )             37.5     08-10    134<L>  |  94<L>  |  31.0<H>  ----------------------------<  197<H>  4.7   |  27.0  |  1.32<H>    Ca    8.4<L>      10 Aug 2020 07:00  Phos  1.9     08-10  Mg     2.0     08-10    ASSESSMENT:  87y old  Male with PMH/ HFpEF, CAD, HTN, HLD, DM type 2 on oral hypoglycemics, CKD stage III; rib fractures complicated by L-sided pleural effusion, requiring thoracentesis ~2 years ago; B/L pleural effusions, s/p L thoracentesis- 20, transudative, chronic lightheadedness, GERD, who presents with a chief complaint of MEJÍA and gait instability. Pt states that since about 2 mos ago he has been feeling short of breath, more with exertion -- walking around his apartment or with bending to put his shoes on. Pt states that today his visiting nurse became concerned about his breathing, him getting "winded" with minimal exertion and abnormal lung sounds. Pt states that he has been feeling the same since about 2 mos ago. Pt states that sometimes he feels chest burning if he eats late or he eats heavy meal, especially meat. pt states that he lost about 10 lbs since about 2 mos ago due to has been taking Abx for L heal ulcer and his appetite has been poor. Pt states that his doctor discontinued BP medications 2/2 low BP.   -as above pt had [facility] 20-Non obst CAD  -Pleural effusions/improved,  Right with CT to be removed today   -AF rate controlled on IV heparin , to discuss AC with family and Dr. Cardenas, pt appears to be significant fall risk  Plan:  continue current meds and management   will discuss amiodarone with Dr. Cardenas SUBJECTIVE:  Cardiology NP F/U note:  Dr. Cardenas F/U Pleural Effusions/ AF  denies complaints of chest pain/sob/dizziness/palps  "feels Tired" Weak  appetite poor"not eating well"  OOB with PT      	  MEDICATIONS  (STANDING):  ascorbic acid 500 milliGRAM(s) Oral daily  aspirin  chewable 81 milliGRAM(s) Oral daily  atorvastatin 20 milliGRAM(s) Oral at bedtime  collagenase Ointment 1 Application(s) Topical daily  Dakins Solution - 1/2 Strength 1 Application(s) Topical daily  dextrose 5%. 1000 milliLiter(s) IV Continuous <Continuous>  dextrose 50% Injectable 12.5 Gram(s) IV Push once  dextrose 50% Injectable 25 Gram(s) IV Push once  dextrose 50% Injectable 25 Gram(s) IV Push once  heparin  Infusion. 900 Unit(s)/Hr IV Continuous <Continuous>  insulin lispro (HumaLOG) corrective regimen sliding scale   SubCutaneous three times a day before meals  insulin lispro (HumaLOG) corrective regimen sliding scale   SubCutaneous at bedtime  latanoprost 0.005% Ophthalmic Solution 1 Drop(s) Both EYES at bedtime  lidocaine 1% Injectable 10 milliLiter(s) Local Injection once  metoprolol succinate ER 25 milliGRAM(s) Oral daily  multivitamin 1 Tablet(s) Oral daily  pantoprazole    Tablet 40 milliGRAM(s) Oral before breakfast  povidone iodine 10% Solution 1 Application(s) Topical two times a day  sodium phosphate IVPB 15 milliMole(s) IV Intermittent once  torsemide 10 milliGRAM(s) Oral daily    MEDICATIONS  (PRN):  acetaminophen   Tablet .. 650 milliGRAM(s) Oral every 6 hours PRN Temp greater or equal to 38C (100.4F), Mild Pain (1 - 3), Moderate Pain (4 - 6)  ALPRAZolam 0.25 milliGRAM(s) Oral daily PRN anxiety  dextrose 40% Gel 15 Gram(s) Oral once PRN Blood Glucose LESS THAN 70 milliGRAM(s)/deciliter  glucagon  Injectable 1 milliGRAM(s) IntraMuscular once PRN Glucose LESS THAN 70 milligrams/deciliter  heparin   Injectable 4500 Unit(s) IV Push every 6 hours PRN For aPTT less than 40  heparin   Injectable 2000 Unit(s) IV Push every 6 hours PRN For aPTT between 40 - 57  oxycodone    5 mG/acetaminophen 325 mG 1 Tablet(s) Oral every 4 hours PRN Moderate Pain (4 - 6)      PHYSICAL EXAM:    T(C): 36.5 (08-10-20 @ 10:04), Max: 36.8 (20 @ 21:24)  HR: 108 (08-10-20 @ 10:04) (81 - 108)  BP: 97/61 (08-10-20 @ 10:04) (97/61 - 123/68)  RR: 18 (08-10-20 @ 10:04) (16 - 18)  SpO2: 98% (08-10-20 @ 10:04) (95% - 98%)  Wt(kg): --    I&O's Summary    09 Aug 2020 07:01  -  10 Aug 2020 07:00  --------------------------------------------------------  IN: 170 mL / OUT: 656 mL / NET: -486 mL        Daily     Daily Weight in k.2 (10 Aug 2020 04:46)    Appearance: weak, frail Cachetic, male NAD  Cardiovascular: Normal S1 S2 IRREG 60's + PVC , No JVD, No murmurs, No edema  Respiratory: diminished but clear	  Gastrointestinal:  Soft, Non-tender, + BS	  Skin: warm and dry  Neurologic: Non-focal  Extremities: Normal range of motion,:  Vascular: Peripheral pulses palpable 2+ bilaterally    TELEMETRY: AF 60's woth PVC.  couplets	    ECG:  	  RADIOLOGY:   DIAGNOSTIC TESTING:  [ ] Echocardiogram:  < from: TTE Echo Complete w/o Contrast w/ Doppler (20 @ 08:49) >   Left ventricular ejection fraction, by visual estimation, is 45 to 50%.   2. Mildly decreased global left ventricular systolic function.   3. Mildly increased LV wall thickness.   4. Normal left ventricular internal cavity size.   5. Spectral Doppler shows pseudonormal pattern of left ventricular myocardial filling (Grade II diastolic dysfunction).   6. Large pleural effusion in both left and right lateral regions.   7. There is no evidence of pericardial effusion.   8. Mild mitral valve regurgitation.   9. Thickening and calcification of the anterior and posterior mitral valve leaflets.  10. Moderate tricuspid regurgitation.  11. Estimated pulmonary artery systolic pressure is 55.3 mmHg assuming a right atrial pressure of 8 mmHg, which is consistent with moderate pulmonary hypertension.  12. Mitral valve mean gradient is 4.0 mmHg consistent with mild mitral stenosis.  13. The aortic valve mean gradient is 3.0 mmHg consistent with normally opening aortic valve.      [ ]  Catheterization:  < from: Cardiac Cath Lab - Adult (20 @ 16:42) >  VENTRICLES: No left ventriculogram was performed.  CORONARY VESSELS: The coronary circulation is right dominant.  LM:   --  LM: The vessel was normal sized. Angiography showed minor luminal  irregularities with no flow limiting lesions.  LAD:   --  LAD: The vessel was normal sized and moderately calcified.  Angiography showed moderate atherosclerosis.  --  Mid LAD: There was a discrete 50 % stenosis.  CX:   --  Circumflex: The vessel was large sized (dominant).  --  Ostial circumflex: There was a discrete 40 % stenosis.  --  Distal circumflex: There was a discrete 60 % stenosis. It does not  appear amenable to intervention.  --  OM1: The vessel was normal sized. There was a diffuse 40 % stenosis.  RCA:   --  RCA: Normal. The vessel was very small sized.  COMPLICATIONS: No complications occurred during the cath lab visit.  DIAGNOSTIC RECOMMENDATIONS: Overall non-obstructive CAD. Would maximize  medical therapy for diastolic CHF and CAD.  Prepared and signed by                                    10.7   8.08  )-----------( 206      ( 10 Aug 2020 07:00 )             37.5     08-10    134<L>  |  94<L>  |  31.0<H>  ----------------------------<  197<H>  4.7   |  27.0  |  1.32<H>    Ca    8.4<L>      10 Aug 2020 07:00  Phos  1.9     08-10  Mg     2.0     08-10    ASSESSMENT:  87y old  Male with PMH/ HFpEF, CAD, HTN, HLD, DM type 2 on oral hypoglycemics, CKD stage III; rib fractures complicated by L-sided pleural effusion, requiring thoracentesis ~2 years ago; B/L pleural effusions, s/p L thoracentesis- 20, transudative, chronic lightheadedness, GERD, who presents with a chief complaint of MEJÍA and gait instability. Pt states that since about 2 mos ago he has been feeling short of breath, more with exertion -- walking around his apartment or with bending to put his shoes on. Pt states that today his visiting nurse became concerned about his breathing, him getting "winded" with minimal exertion and abnormal lung sounds. Pt states that he has been feeling the same since about 2 mos ago. Pt states that sometimes he feels chest burning if he eats late or he eats heavy meal, especially meat. pt states that he lost about 10 lbs since about 2 mos ago due to has been taking Abx for L heal ulcer and his appetite has been poor. Pt states that his doctor discontinued BP medications 2/2 low BP.   -as above pt had [facility] 20-Non obst CAD  -Pleural effusions/improved,  Right with CT to be removed today   -AF rate controlled on IV heparin , to discuss AC with family and Dr. Cardenas, pt appears to be significant fall risk  Plan:  continue current meds and management    discussed amiodarone with Dr. Cardenas / to start 400 q 8 x 3days/ then 400mg BID x 3days then 200mg daily  Change IV heparin to Eliquis 2.5 Bid upon d/c   will most likely need JANAE/ rehab

## 2020-08-11 ENCOUNTER — APPOINTMENT (OUTPATIENT)
Dept: THORACIC SURGERY | Facility: HOSPITAL | Age: 85
End: 2020-08-11

## 2020-08-11 ENCOUNTER — TRANSCRIPTION ENCOUNTER (OUTPATIENT)
Age: 85
End: 2020-08-11

## 2020-08-11 DIAGNOSIS — E11.621 TYPE 2 DIABETES MELLITUS WITH FOOT ULCER: ICD-10-CM

## 2020-08-11 LAB
ANION GAP SERPL CALC-SCNC: 14 MMOL/L — SIGNIFICANT CHANGE UP (ref 5–17)
APTT BLD: 35.4 SEC — SIGNIFICANT CHANGE UP (ref 27.5–35.5)
BASOPHILS # BLD AUTO: 0.03 K/UL — SIGNIFICANT CHANGE UP (ref 0–0.2)
BASOPHILS NFR BLD AUTO: 0.4 % — SIGNIFICANT CHANGE UP (ref 0–2)
BUN SERPL-MCNC: 40 MG/DL — HIGH (ref 8–20)
CALCIUM SERPL-MCNC: 8.2 MG/DL — LOW (ref 8.6–10.2)
CHLORIDE SERPL-SCNC: 90 MMOL/L — LOW (ref 98–107)
CO2 SERPL-SCNC: 26 MMOL/L — SIGNIFICANT CHANGE UP (ref 22–29)
CREAT SERPL-MCNC: 1.63 MG/DL — HIGH (ref 0.5–1.3)
CULTURE RESULTS: SIGNIFICANT CHANGE UP
EOSINOPHIL # BLD AUTO: 0.05 K/UL — SIGNIFICANT CHANGE UP (ref 0–0.5)
EOSINOPHIL NFR BLD AUTO: 0.7 % — SIGNIFICANT CHANGE UP (ref 0–6)
GLUCOSE BLDC GLUCOMTR-MCNC: 131 MG/DL — HIGH (ref 70–99)
GLUCOSE BLDC GLUCOMTR-MCNC: 154 MG/DL — HIGH (ref 70–99)
GLUCOSE BLDC GLUCOMTR-MCNC: 172 MG/DL — HIGH (ref 70–99)
GLUCOSE BLDC GLUCOMTR-MCNC: 200 MG/DL — HIGH (ref 70–99)
GLUCOSE SERPL-MCNC: 217 MG/DL — HIGH (ref 70–99)
GRAM STN FLD: SIGNIFICANT CHANGE UP
HCT VFR BLD CALC: 34.2 % — LOW (ref 39–50)
HGB BLD-MCNC: 9.9 G/DL — LOW (ref 13–17)
IMM GRANULOCYTES NFR BLD AUTO: 0.3 % — SIGNIFICANT CHANGE UP (ref 0–1.5)
LYMPHOCYTES # BLD AUTO: 0.96 K/UL — LOW (ref 1–3.3)
LYMPHOCYTES # BLD AUTO: 14.3 % — SIGNIFICANT CHANGE UP (ref 13–44)
MAGNESIUM SERPL-MCNC: 1.9 MG/DL — SIGNIFICANT CHANGE UP (ref 1.6–2.6)
MCHC RBC-ENTMCNC: 24.8 PG — LOW (ref 27–34)
MCHC RBC-ENTMCNC: 28.9 GM/DL — LOW (ref 32–36)
MCV RBC AUTO: 85.5 FL — SIGNIFICANT CHANGE UP (ref 80–100)
MONOCYTES # BLD AUTO: 0.59 K/UL — SIGNIFICANT CHANGE UP (ref 0–0.9)
MONOCYTES NFR BLD AUTO: 8.8 % — SIGNIFICANT CHANGE UP (ref 2–14)
NEUTROPHILS # BLD AUTO: 5.06 K/UL — SIGNIFICANT CHANGE UP (ref 1.8–7.4)
NEUTROPHILS NFR BLD AUTO: 75.5 % — SIGNIFICANT CHANGE UP (ref 43–77)
ORGANISM # SPEC MICROSCOPIC CNT: SIGNIFICANT CHANGE UP
PHOSPHATE SERPL-MCNC: 2.9 MG/DL — SIGNIFICANT CHANGE UP (ref 2.4–4.7)
PLATELET # BLD AUTO: 198 K/UL — SIGNIFICANT CHANGE UP (ref 150–400)
POTASSIUM SERPL-MCNC: 4.6 MMOL/L — SIGNIFICANT CHANGE UP (ref 3.5–5.3)
POTASSIUM SERPL-SCNC: 4.6 MMOL/L — SIGNIFICANT CHANGE UP (ref 3.5–5.3)
RBC # BLD: 4 M/UL — LOW (ref 4.2–5.8)
RBC # FLD: 23.4 % — HIGH (ref 10.3–14.5)
SARS-COV-2 RNA SPEC QL NAA+PROBE: SIGNIFICANT CHANGE UP
SODIUM SERPL-SCNC: 130 MMOL/L — LOW (ref 135–145)
SPECIMEN SOURCE: SIGNIFICANT CHANGE UP
SPECIMEN SOURCE: SIGNIFICANT CHANGE UP
WBC # BLD: 6.71 K/UL — SIGNIFICANT CHANGE UP (ref 3.8–10.5)
WBC # FLD AUTO: 6.71 K/UL — SIGNIFICANT CHANGE UP (ref 3.8–10.5)

## 2020-08-11 PROCEDURE — 99232 SBSQ HOSP IP/OBS MODERATE 35: CPT

## 2020-08-11 PROCEDURE — 93010 ELECTROCARDIOGRAM REPORT: CPT

## 2020-08-11 RX ORDER — AMIODARONE HYDROCHLORIDE 400 MG/1
2 TABLET ORAL
Qty: 0 | Refills: 0 | DISCHARGE
Start: 2020-08-11

## 2020-08-11 RX ORDER — SENNA PLUS 8.6 MG/1
2 TABLET ORAL
Qty: 0 | Refills: 0 | DISCHARGE
Start: 2020-08-11

## 2020-08-11 RX ORDER — APIXABAN 2.5 MG/1
1 TABLET, FILM COATED ORAL
Qty: 60 | Refills: 0
Start: 2020-08-11 | End: 2020-09-09

## 2020-08-11 RX ORDER — ACETAMINOPHEN 500 MG
2 TABLET ORAL
Qty: 0 | Refills: 0 | DISCHARGE
Start: 2020-08-11

## 2020-08-11 RX ORDER — SODIUM HYPOCHLORITE 0.125 %
1 SOLUTION, NON-ORAL MISCELLANEOUS
Qty: 0 | Refills: 0 | DISCHARGE
Start: 2020-08-11

## 2020-08-11 RX ORDER — METOPROLOL TARTRATE 50 MG
1 TABLET ORAL
Qty: 30 | Refills: 0
Start: 2020-08-11 | End: 2020-09-09

## 2020-08-11 RX ORDER — POLYETHYLENE GLYCOL 3350 17 G/17G
17 POWDER, FOR SOLUTION ORAL
Qty: 0 | Refills: 0 | DISCHARGE
Start: 2020-08-11

## 2020-08-11 RX ORDER — MAGNESIUM OXIDE 400 MG ORAL TABLET 241.3 MG
1 TABLET ORAL
Qty: 0 | Refills: 0 | DISCHARGE
Start: 2020-08-11

## 2020-08-11 RX ORDER — AMIODARONE HYDROCHLORIDE 400 MG/1
2 TABLET ORAL
Qty: 12 | Refills: 0
Start: 2020-08-11 | End: 2020-08-13

## 2020-08-11 RX ORDER — SODIUM CHLORIDE 9 MG/ML
250 INJECTION INTRAMUSCULAR; INTRAVENOUS; SUBCUTANEOUS ONCE
Refills: 0 | Status: COMPLETED | OUTPATIENT
Start: 2020-08-11 | End: 2020-08-11

## 2020-08-11 RX ORDER — MAGNESIUM OXIDE 400 MG ORAL TABLET 241.3 MG
400 TABLET ORAL
Refills: 0 | Status: DISCONTINUED | OUTPATIENT
Start: 2020-08-11 | End: 2020-08-12

## 2020-08-11 RX ORDER — ASCORBIC ACID 60 MG
1 TABLET,CHEWABLE ORAL
Qty: 0 | Refills: 0 | DISCHARGE
Start: 2020-08-11

## 2020-08-11 RX ORDER — COLLAGENASE CLOSTRIDIUM HIST. 250 UNIT/G
1 OINTMENT (GRAM) TOPICAL
Qty: 1 | Refills: 0
Start: 2020-08-11 | End: 2020-09-09

## 2020-08-11 RX ORDER — ASPIRIN/CALCIUM CARB/MAGNESIUM 324 MG
1 TABLET ORAL
Qty: 0 | Refills: 0 | DISCHARGE
Start: 2020-08-11

## 2020-08-11 RX ORDER — AMIODARONE HYDROCHLORIDE 400 MG/1
2 TABLET ORAL
Qty: 12 | Refills: 0
Start: 2020-08-11 | End: 2020-08-12

## 2020-08-11 RX ORDER — MAGNESIUM OXIDE 400 MG ORAL TABLET 241.3 MG
1 TABLET ORAL
Qty: 6 | Refills: 0
Start: 2020-08-11 | End: 2020-08-12

## 2020-08-11 RX ORDER — POVIDONE-IODINE 5 %
1 AEROSOL (ML) TOPICAL
Qty: 0 | Refills: 0 | DISCHARGE
Start: 2020-08-11

## 2020-08-11 RX ORDER — AMIODARONE HYDROCHLORIDE 400 MG/1
1 TABLET ORAL
Qty: 30 | Refills: 0
Start: 2020-08-11 | End: 2020-09-09

## 2020-08-11 RX ORDER — APIXABAN 2.5 MG/1
1 TABLET, FILM COATED ORAL
Qty: 0 | Refills: 0 | DISCHARGE
Start: 2020-08-11

## 2020-08-11 RX ORDER — PATIROMER 8.4 G/1
0 POWDER, FOR SUSPENSION ORAL
Qty: 0 | Refills: 0 | DISCHARGE

## 2020-08-11 RX ORDER — AMIODARONE HYDROCHLORIDE 400 MG/1
1 TABLET ORAL
Qty: 0 | Refills: 0 | DISCHARGE
Start: 2020-08-11

## 2020-08-11 RX ORDER — METOPROLOL TARTRATE 50 MG
1 TABLET ORAL
Qty: 0 | Refills: 0 | DISCHARGE

## 2020-08-11 RX ORDER — COLLAGENASE CLOSTRIDIUM HIST. 250 UNIT/G
1 OINTMENT (GRAM) TOPICAL
Qty: 0 | Refills: 0 | DISCHARGE

## 2020-08-11 RX ADMIN — SENNA PLUS 2 TABLET(S): 8.6 TABLET ORAL at 21:34

## 2020-08-11 RX ADMIN — Medication 1 TABLET(S): at 09:11

## 2020-08-11 RX ADMIN — SODIUM CHLORIDE 500 MILLILITER(S): 9 INJECTION INTRAMUSCULAR; INTRAVENOUS; SUBCUTANEOUS at 02:11

## 2020-08-11 RX ADMIN — Medication 10 MILLIGRAM(S): at 09:11

## 2020-08-11 RX ADMIN — AMIODARONE HYDROCHLORIDE 400 MILLIGRAM(S): 400 TABLET ORAL at 06:19

## 2020-08-11 RX ADMIN — Medication 81 MILLIGRAM(S): at 09:11

## 2020-08-11 RX ADMIN — APIXABAN 2.5 MILLIGRAM(S): 2.5 TABLET, FILM COATED ORAL at 06:19

## 2020-08-11 RX ADMIN — Medication 0.25 MILLIGRAM(S): at 02:56

## 2020-08-11 RX ADMIN — Medication 650 MILLIGRAM(S): at 11:29

## 2020-08-11 RX ADMIN — Medication 1 APPLICATION(S): at 19:57

## 2020-08-11 RX ADMIN — APIXABAN 2.5 MILLIGRAM(S): 2.5 TABLET, FILM COATED ORAL at 17:33

## 2020-08-11 RX ADMIN — AMIODARONE HYDROCHLORIDE 400 MILLIGRAM(S): 400 TABLET ORAL at 17:29

## 2020-08-11 RX ADMIN — Medication 1: at 09:10

## 2020-08-11 RX ADMIN — PANTOPRAZOLE SODIUM 40 MILLIGRAM(S): 20 TABLET, DELAYED RELEASE ORAL at 06:19

## 2020-08-11 RX ADMIN — Medication 500 MILLIGRAM(S): at 09:11

## 2020-08-11 RX ADMIN — LATANOPROST 1 DROP(S): 0.05 SOLUTION/ DROPS OPHTHALMIC; TOPICAL at 21:34

## 2020-08-11 RX ADMIN — MAGNESIUM OXIDE 400 MG ORAL TABLET 400 MILLIGRAM(S): 241.3 TABLET ORAL at 17:32

## 2020-08-11 RX ADMIN — Medication 1 APPLICATION(S): at 06:30

## 2020-08-11 RX ADMIN — POLYETHYLENE GLYCOL 3350 17 GRAM(S): 17 POWDER, FOR SOLUTION ORAL at 09:11

## 2020-08-11 RX ADMIN — Medication 1: at 17:30

## 2020-08-11 RX ADMIN — Medication 1 APPLICATION(S): at 18:22

## 2020-08-11 RX ADMIN — Medication 10 MILLIGRAM(S): at 11:38

## 2020-08-11 RX ADMIN — ATORVASTATIN CALCIUM 20 MILLIGRAM(S): 80 TABLET, FILM COATED ORAL at 21:34

## 2020-08-11 NOTE — DISCHARGE NOTE PROVIDER - CARE PROVIDERS DIRECT ADDRESSES
,DirectAddress_Unknown,[email or web address]

## 2020-08-11 NOTE — GOALS OF CARE CONVERSATION - ADVANCED CARE PLANNING - CONVERSATION DETAILS
> Goals of care - I had a lengthy conversation with pt & daughter Naye.  We discussed the comorbid conditions, hospital care, and prognosis.  I also discussed advanced directives with the family - made aware of limited prognosis if patient were to be intubated or resuscitated, in consideration of age and comorbid condition. Total time spent on patient care discussion = 45 minutes. Pt & daughter opted for FULL CODE

## 2020-08-11 NOTE — DISCHARGE NOTE PROVIDER - HOSPITAL COURSE
87y old  Male with PMH/ HFpEF, CAD, HTN, HLD, DM type 2 on oral hypoglycemics, CKD stage III; rib fractures complicated by L-sided pleural effusion requiring thoracentesis ~2 years ago; B/L pleural effusions, s/p L thoracentesis- 01/02/20, transudative, chronic lightheadedness, GERD, who presents with dyspnea and CHF.        Dyspnea likely multifactorial - Bilateral pleural effusions , acute on chronic systolic and diastolic CHF, Anemia     - improved s/p R chest tube. Spoke to IR, loculated L sided effusions, will defer L thoracocentesis at this time and monitor, R pleural effusion.  780cc drained in 24h.    - Lasix BID changed to Torsemide     -Transudate on fluid analysis    - effusion cytology negative for malignant cells    - thoracic surgery following, will remove drain when output minimal, daily cxr      Low output, Maintain chest tube to water seal today     Follow up daily output    Repeat CXR in AM while chest tube is in place    s/p chest tube removal by CTS. No plans for pleurex as pt's right pleural effusion is not recurrent as per CTS     TTE done showed Left ventricular ejection fraction, by visual estimation, is 45 to 50%, Mildly decreased global left ventricular systolic function, Spectral Doppler shows pseudonormal pattern of left ventricular myocardial filling (Grade II diastolic dysfunction), Large pleural effusion in both left and right lateral regions, Estimated pulmonary artery systolic pressure is 55.3 mmHg assuming a right atrial pressure of 8 mmHg, which is consistent with moderate pulmonary hypertension.    Pt is at high risk for re-admissions. Disccussed with pt in details GOC with daughter. She & pt decide FULL code with admission to the hospital if necessary        NSVT-    Keep K > 4 & Mg > 2    Started amiodarone per Dr. Cardenas / to start 400 q 8 x 3days/ then 400mg BID x 3days then 200mg daily    c/w Eliquis 2.5 Bid     No events on tele noted overnight. Cleared by cardio for d/c to [facility]            Acute on chronic systolic & diastolic CHF with negative troponin and elevated BNP     - s/p Echo, EF 45-50%    - Lasix BID changed to Torsemide     - holding ACEI given soft BPs    - s/p cardiac cath, non-obstructive CAD    BP 98/60 manually but pt asymptomatic. Saturating 99% on RA        New onset Afib    - CHADSVASC 5    - started metoprolol succinate 25mg QD as per cards    -c/w Eliquis 2.5 Bid     TSH is 6.42, will recheck, patient is s/p cath showed Overall non-obstructive CAD. as per them Would maximize    medical therapy for diastolic CHF and CAD.        L heel ulcer    - pt followed outpatient by Dr Erazo, who recommended betadine BID    - pt due for outpatient bone culture as per Dr Erazo    s/p bone biopsy    Applied Santyl/DSD    Recommend offloading to LLE while in bed    Podiatry following    Patient stable for discharge from podiatry stand point and to follow at wound care centre.            Hypomagnesemia    - repleted, goal >2.0, will monitor         T2DM    - HbA1c 6.3% , goal <8.0%    - d/c sliding scale and monitor with daily labs    - DASH diet        Weight loss and early satiety    - could be CHF related but may need GI workup. 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         Constipation- bowel regimen started. No abd pian, N/V. Now having BMs         Pt seen by PT who recs [facility]. COVID testing sent for d/c to [facility]        PHYSICAL EXAM:    GENERAL: frail, elderly, NAD    HEAD: Atraumatic, Normocephalic    EYES: EOMI, PERRLA, conjunctiva and sclera clear    ENMT: Moist mucous membranes    NECK: Supple, No JVD    NERVOUS SYSTEM:  Alert & Oriented X3, Motor Strength 5/5 B/L upper and lower extremities; DTRs 2+ intact and symmetric    CHEST/LUNG: Clear to auscultation bilaterally; No rales, rhonchi, wheezing, or rubs, no bleeding from dressing site    HEART: Regular rate and rhythm; No murmurs, rubs, or gallops    ABDOMEN: Soft, Nontender, Nondistended; Bowel sounds present    EXTREMITIES:  2+ Peripheral Pulses, No clubbing, cyanosis, or edema 87y old  Male with PMH/ HFpEF, CAD, HTN, HLD, DM type 2 on oral hypoglycemics, CKD stage III; rib fractures complicated by L-sided pleural effusion requiring thoracentesis ~2 years ago; B/L pleural effusions, s/p L thoracentesis- 01/02/20, transudative, chronic lightheadedness, GERD, who presents with dyspnea and CHF.        Dyspnea likely multifactorial - Bilateral pleural effusions , acute on chronic systolic and diastolic CHF, Anemia     - improved s/p R chest tube. Spoke to IR, loculated L sided effusions, will defer L thoracocentesis at this time and monitor, R pleural effusion.  780cc drained in 24h.    - Lasix BID changed to Torsemide     -Transudate on fluid analysis    - effusion cytology negative for malignant cells    - thoracic surgery following, will remove drain when output minimal, daily cxr      Low output, Maintain chest tube to water seal today     Follow up daily output    Repeat CXR in AM while chest tube is in place    s/p chest tube removal by CTS. No plans for pleurex as pt's right pleural effusion is not recurrent as per CTS     TTE done showed Left ventricular ejection fraction, by visual estimation, is 45 to 50%, Mildly decreased global left ventricular systolic function, Spectral Doppler shows pseudonormal pattern of left ventricular myocardial filling (Grade II diastolic dysfunction), Large pleural effusion in both left and right lateral regions, Estimated pulmonary artery systolic pressure is 55.3 mmHg assuming a right atrial pressure of 8 mmHg, which is consistent with moderate pulmonary hypertension.    Pt is at high risk for re-admissions. Disccussed with pt in details GOC with daughter. She & pt decide FULL code with admission to the hospital if necessary        NSVT-    Keep K > 4 & Mg > 2    Started amiodarone per Dr. Cardenas / to start 400 q 8 x 3days/ then 400mg BID x 3days then 200mg daily    c/w Eliquis 2.5 Bid     No events on tele noted overnight. Cleared by cardio for d/c to [facility]            Acute on chronic systolic & diastolic CHF with negative troponin and elevated BNP     - s/p Echo, EF 45-50%    - Lasix BID changed to Torsemide     - holding ACEI given soft BPs    - s/p cardiac cath, non-obstructive CAD    BP 98/60 manually but pt asymptomatic. Saturating 99% on RA        New onset Afib    - CHADSVASC 5    - started metoprolol succinate 25mg QD as per cards    -c/w Eliquis 2.5 Bid     TSH is 6.42, will recheck, patient is s/p cath showed Overall non-obstructive CAD. as per them Would maximize    medical therapy for diastolic CHF and CAD.        L heel ulcer    - pt followed outpatient by Dr Erazo, who recommended betadine BID    - pt due for outpatient bone culture as per Dr Erazo    s/p bone biopsy    Applied Santyl/DSD    Recommend offloading to LLE while in bed    Podiatry following    Patient stable for discharge from podiatry stand point and to follow at wound care centre.            Hypomagnesemia    - repleted, goal >2.0, will monitor         T2DM    - HbA1c 6.3% , goal <8.0%    - d/c sliding scale and monitor with daily labs    - DASH diet        Weight loss and early satiety    - could be CHF related but may need GI workup. 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         Constipation- bowel regimen started. No abd pian, N/V. Now having BMs         Pt seen by PT who recs [facility]. Family refusing [facility]. Home with home care being set up        PHYSICAL EXAM:    GENERAL: frail, elderly, NAD    HEAD: Atraumatic, Normocephalic    EYES: EOMI, PERRLA, conjunctiva and sclera clear    ENMT: Moist mucous membranes    NECK: Supple, No JVD    NERVOUS SYSTEM:  Alert & Oriented X3, Motor Strength 5/5 B/L upper and lower extremities; DTRs 2+ intact and symmetric    CHEST/LUNG: Clear to auscultation bilaterally; No rales, rhonchi, wheezing, or rubs, no bleeding from dressing site    HEART: Regular rate and rhythm; No murmurs, rubs, or gallops    ABDOMEN: Soft, Nontender, Nondistended; Bowel sounds present    EXTREMITIES:  2+ Peripheral Pulses, No clubbing, cyanosis, or edema 87y old  Male with PMH/ HFpEF, CAD, HTN, HLD, DM type 2 on oral hypoglycemics, CKD stage III; rib fractures complicated by L-sided pleural effusion requiring thoracentesis ~2 years ago; B/L pleural effusions, s/p L thoracentesis- 01/02/20, transudative, chronic lightheadedness, GERD, who presents with dyspnea and CHF.        Dyspnea likely multifactorial - Bilateral pleural effusions , acute on chronic systolic and diastolic CHF, Anemia     - improved s/p R chest tube. Spoke to IR, loculated L sided effusions, will defer L thoracocentesis at this time and monitor, R pleural effusion.  780cc drained in 24h.    - Lasix BID changed to Torsemide     -Transudate on fluid analysis    - effusion cytology negative for malignant cells    - thoracic surgery following, will remove drain when output minimal, daily cxr      Low output, Maintain chest tube to water seal today     Follow up daily output    Repeat CXR in AM while chest tube is in place    s/p chest tube removal by CTS. No plans for pleurex as pt's right pleural effusion is not recurrent as per CTS     TTE done showed Left ventricular ejection fraction, by visual estimation, is 45 to 50%, Mildly decreased global left ventricular systolic function, Spectral Doppler shows pseudonormal pattern of left ventricular myocardial filling (Grade II diastolic dysfunction), Large pleural effusion in both left and right lateral regions, Estimated pulmonary artery systolic pressure is 55.3 mmHg assuming a right atrial pressure of 8 mmHg, which is consistent with moderate pulmonary hypertension.    Pt is at high risk for re-admissions. Disccussed with pt in details GOC with daughter. She & pt decide FULL code with admission to the hospital if necessary        NSVT-    Keep K > 4 & Mg > 2    Started amiodarone per Dr. Cardenas / to start 400 q 8 x 3days/ then 400mg BID x 3days then 200mg daily    c/w Eliquis 2.5 Bid     No events on tele noted overnight. Cleared by cardio for d/c to [facility]            Acute on chronic systolic & diastolic CHF with negative troponin and elevated BNP     - s/p Echo, EF 45-50%    - Lasix BID changed to Torsemide     - holding ACEI given soft BPs    - s/p cardiac cath, non-obstructive CAD    BP 98/60 manually but pt asymptomatic. Saturating 99% on RA        New onset Afib    - CHADSVASC 5    - started metoprolol succinate 25mg QD as per cards    -c/w Eliquis 2.5 Bid     TSH is 6.42, will recheck, patient is s/p cath showed Overall non-obstructive CAD. as per them Would maximize    medical therapy for diastolic CHF and CAD.        L heel ulcer    - pt followed outpatient by Dr Erazo, who recommended betadine BID    - pt due for outpatient bone culture as per Dr Erazo    s/p bone biopsy    Applied Santyl/DSD    Recommend offloading to LLE while in bed    Podiatry following    Patient stable for discharge from podiatry stand point and to follow at wound care centre.            Hypomagnesemia    - repleted, goal >2.0, will monitor         T2DM    - HbA1c 6.3% , goal <8.0%    - d/c sliding scale and monitor with daily labs    - DASH diet        Weight loss and early satiety    - could be CHF related but may need GI workup. 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         Constipation- bowel regimen started. No abd pian, N/V. Now having BMs         Pt seen by PT who recs [facility]. Family refusing [facility] despite beign exlained that pt is a 2 person assist. Home with home care being set up        PHYSICAL EXAM:    GENERAL: frail, elderly, NAD    HEAD: Atraumatic, Normocephalic    EYES: EOMI, PERRLA, conjunctiva and sclera clear    ENMT: Moist mucous membranes    NECK: Supple, No JVD    NERVOUS SYSTEM:  Alert & Oriented X3, Motor Strength 5/5 B/L upper and lower extremities; DTRs 2+ intact and symmetric    CHEST/LUNG: Clear to auscultation bilaterally; No rales, rhonchi, wheezing, or rubs, no bleeding from dressing site    HEART: Regular rate and rhythm; No murmurs, rubs, or gallops    ABDOMEN: Soft, Nontender, Nondistended; Bowel sounds present    EXTREMITIES:  2+ Peripheral Pulses, No clubbing, cyanosis, or edema 87y old  Male with PMH/ HFpEF, CAD, HTN, HLD, DM type 2 on oral hypoglycemics, CKD stage III; rib fractures complicated by L-sided pleural effusion requiring thoracentesis ~2 years ago; B/L pleural effusions, s/p L thoracentesis- 01/02/20, transudative, chronic lightheadedness, GERD, who presents with dyspnea and CHF.        1. Dyspnea     - Multifactorial, Bilateral transudative pleural effusions, acute on chronic systolic and diastolic CHF, Anemia     - Improved s/p R chest tube, now removed. No plan for pleurex as pts right effusion is not recurrent per CTS    - Spoke to IR, loculated L sided effusions, will defer L thoracocentesis at this time and monitor    - Fluid analysis supports transudative effusion, cytology neg for malignant cells, cultures ntd    - Lasix BID changed to Torsemide    - Not requiring supplemental O2        2. NSVT-    - Keep K > 4 & Mg > 2    - Monitor on tele    - Started amiodarone per Dr. Cardenas / tyler start 400 q 8 x 3days/ then 400mg BID x 3days then 200mg daily    - C/w Eliquis BID         3. Acute on chronic systolic & diastolic CHF with negative troponin and elevated BNP     - S/p Echo, EF 45-50%    - S/p cardiac cath, non-obstructive CAD    - Lasix BID changed to Torsemide     - Holding ACEI given soft BPs    - Outpatient cardio follow up on DC        4. New onset Afib    - Rate controlled with amio    - Sarted metoprolol succinate 25mg QD as per cards    - CHADSVASC 5    - C/w eliquis         5. L heel ulcer    - Pt followed outpatient by Dr Erazo, who recommended betadine BID    - Xrays no OM    - ABIS Reviewed     - S/p bedside bone biopsy by podiatry    - Stable for DC from podiatry stand point, will need to follow at wound care center on discharge     - Applied Santyl/DSD    - Recommend offloading to LLE while in bed        6. T2DM    - HbA1c 6.3% , goal <8.0%    - D/c sliding scale and monitor with daily labs    - DASH diet        7. Weight loss and early satiety    - Could be CHF related but may need GI workup     - Nutrition consulted        8. CAD/HLD    - non-obstructive CAD as per cardiac cath, cont statin and aspirin     - positive calcification on CT Lcx and LAD        9. Anemia due to AOCD-    - Anemia of chronic disease, no bleeding, H & H has been stable     - Stool Occult blood negative    - Monitor         10. CKD    - stable     - renal function stable post cath        11. Severe Protein Calorie Malnutrition    - Nutrition eval called    - Add Glucerna TID     - MVI and vit C 500mg daily.         DVT PPX- on eliquis        Dispo: D/c home pending homecare arrangements         PHYSICAL EXAM:    GENERAL: frail, elderly, NAD    HEAD: Atraumatic, Normocephalic    EYES: EOMI, PERRLA, conjunctiva and sclera clear    ENMT: Moist mucous membranes    NECK: Supple, No JVD    NERVOUS SYSTEM:  Alert & Oriented X3, Motor Strength 5/5 B/L upper and lower extremities; DTRs 2+ intact and symmetric    CHEST/LUNG: Clear to auscultation bilaterally; No rales, rhonchi, wheezing, or rubs, no bleeding from dressing site    HEART: Regular rate and rhythm; No murmurs, rubs, or gallops    ABDOMEN: Soft, Nontender, Nondistended; Bowel sounds present    EXTREMITIES:  2+ Peripheral Pulses, No clubbing, cyanosis, or edema

## 2020-08-11 NOTE — DISCHARGE NOTE PROVIDER - NSDCMRMEDTOKEN_GEN_ALL_CORE_FT
acetaminophen 325 mg oral tablet: 2 tab(s) orally every 6 hours, As needed, Temp greater or equal to 38C (100.4F), Mild Pain (1 - 3), Moderate Pain (4 - 6)  amiodarone 200 mg oral tablet: 2 tab(s) orally every 8 hours to be given 8/11/20  &amp; 8/12/20  amiodarone 200 mg oral tablet: 2 tab(s) orally 2 times a day from 8/13/20 to 8/15/20  amiodarone 200 mg oral tablet: 1 tab(s) orally once a day from 8/16/20 onwards  apixaban 2.5 mg oral tablet: 1 tab(s) orally every 12 hours  ascorbic acid 500 mg oral tablet: 1 tab(s) orally once a day  aspirin 81 mg oral tablet, chewable: 1 tab(s) orally once a day  atorvastatin 10 mg oral tablet: 1 tab(s) orally once a day  bisacodyl 10 mg rectal suppository: 1 suppository(ies) rectal once a day, As needed, Constipation  bisacodyl 5 mg oral delayed release tablet: 1 tab(s) orally every 12 hours, As needed, Constipation  Combigan 0.2%-0.5% ophthalmic solution: 1 drop(s) to each affected eye every 12 hours  glipiZIDE 10 mg oral tablet: 1 tab(s) orally 2 times a day  Januvia 50 mg oral tablet: 1 tab(s) orally once a day  magnesium oxide 400 mg (241.3 mg elemental magnesium) oral tablet: 1 tab(s) orally 3 times a day (with meals) x 2 days  Metoprolol Succinate ER 25 mg oral tablet, extended release: 1 tab(s) orally once a day  Multiple Vitamins oral tablet: 1 tab(s) orally once a day  oxycodone-acetaminophen 5 mg-325 mg oral tablet: 1 tab(s) orally every 4 hours, As needed, Moderate Pain (4 - 6)  pantoprazole 20 mg oral delayed release tablet: 1 tab(s) orally once a day  polyethylene glycol 3350 oral powder for reconstitution: 17 gram(s) orally once a day  povidone iodine 10% topical solution: 1 application topically 2 times a day  Santyl 250 units/g topical ointment: Apply topically to affected area once a day  senna oral tablet: 2 tab(s) orally once a day (at bedtime)  sodium hypochlorite 0.25% topical solution: 1 application topically once a day  torsemide 10 mg oral tablet: 1 tab(s) orally once a day  Xalatan 0.005% ophthalmic solution: 1 drop(s) to each affected eye once a day (in the evening) acetaminophen 325 mg oral tablet: 2 tab(s) orally every 6 hours, As needed, Temp greater or equal to 38C (100.4F), Mild Pain (1 - 3), Moderate Pain (4 - 6)  amiodarone 200 mg oral tablet: 2 tab(s) orally every 8 hours to be given 8/11/20  &amp; 8/12/20  amiodarone 200 mg oral tablet: 2 tab(s) orally 2 times a day from 8/13/20 to 8/15/20  amiodarone 200 mg oral tablet: 1 tab(s) orally once a day from 8/16/20 onwards  apixaban 2.5 mg oral tablet: 1 tab(s) orally every 12 hours  ascorbic acid 500 mg oral tablet: 1 tab(s) orally once a day  aspirin 81 mg oral tablet, chewable: 1 tab(s) orally once a day  atorvastatin 10 mg oral tablet: 1 tab(s) orally once a day  bisacodyl 10 mg rectal suppository: 1 suppository(ies) rectal once a day, As needed, Constipation  bisacodyl 5 mg oral delayed release tablet: 1 tab(s) orally every 12 hours, As needed, Constipation  Combigan 0.2%-0.5% ophthalmic solution: 1 drop(s) to each affected eye every 12 hours  glipiZIDE 10 mg oral tablet: 1 tab(s) orally 2 times a day  Januvia 50 mg oral tablet: 1 tab(s) orally once a day  magnesium oxide 400 mg (241.3 mg elemental magnesium) oral tablet: 1 tab(s) orally 3 times a day (with meals) x 2 days  Metoprolol Succinate ER 25 mg oral tablet, extended release: 1 tab(s) orally once a day  Multiple Vitamins oral tablet: 1 tab(s) orally once a day  oxycodone-acetaminophen 5 mg-325 mg oral tablet: 1 tab(s) orally every 6 hours, As Needed -Moderate Pain (4 - 6) MDD:4 tabs  pantoprazole 20 mg oral delayed release tablet: 1 tab(s) orally once a day  polyethylene glycol 3350 oral powder for reconstitution: 17 gram(s) orally once a day  povidone iodine 10% topical solution: 1 application topically 2 times a day  Santyl 250 units/g topical ointment: Apply topically to affected area once a day  senna oral tablet: 2 tab(s) orally once a day (at bedtime)  sodium hypochlorite 0.25% topical solution: 1 application topically once a day  torsemide 10 mg oral tablet: 1 tab(s) orally once a day  Xalatan 0.005% ophthalmic solution: 1 drop(s) to each affected eye once a day (in the evening) acetaminophen 325 mg oral tablet: 2 tab(s) orally every 6 hours, As needed, Temp greater or equal to 38C (100.4F), Mild Pain (1 - 3), Moderate Pain (4 - 6)  amiodarone 200 mg oral tablet: 2 tab(s) orally every 8 hours to be given 8/11/20  &amp; 8/12/20  amiodarone 200 mg oral tablet: 2 tab(s) orally 2 times a day from 8/13/20 to 8/15/20  amiodarone 200 mg oral tablet: 1 tab(s) orally once a day from 8/16/20 onwards  apixaban 2.5 mg oral tablet: 1 tab(s) orally every 12 hours  ascorbic acid 500 mg oral tablet: 1 tab(s) orally once a day  aspirin 81 mg oral tablet, chewable: 1 tab(s) orally once a day  atorvastatin 10 mg oral tablet: 1 tab(s) orally once a day  bisacodyl 10 mg rectal suppository: 1 suppository(ies) rectal once a day, As needed, Constipation  bisacodyl 5 mg oral delayed release tablet: 1 tab(s) orally every 12 hours, As needed, Constipation  Combigan 0.2%-0.5% ophthalmic solution: 1 drop(s) to each affected eye every 12 hours  glipiZIDE 10 mg oral tablet: 1 tab(s) orally 2 times a day  Januvia 50 mg oral tablet: 1 tab(s) orally once a day  magnesium oxide 400 mg (241.3 mg elemental magnesium) oral tablet: 1 tab(s) orally 3 times a day (with meals) x 2 days  Metoprolol Succinate ER 25 mg oral tablet, extended release: 0.5 tab(s) orally once a day   Multiple Vitamins oral tablet: 1 tab(s) orally once a day  oxycodone-acetaminophen 5 mg-325 mg oral tablet: 1 tab(s) orally every 6 hours, As Needed -Moderate Pain (4 - 6) MDD:4 tabs  pantoprazole 20 mg oral delayed release tablet: 1 tab(s) orally once a day  polyethylene glycol 3350 oral powder for reconstitution: 17 gram(s) orally once a day  povidone iodine 10% topical solution: 1 application topically 2 times a day  Santyl 250 units/g topical ointment: Apply topically to affected area once a day  senna oral tablet: 2 tab(s) orally once a day (at bedtime)  sodium hypochlorite 0.25% topical solution: 1 application topically once a day  torsemide 10 mg oral tablet: 1 tab(s) orally once a day  Xalatan 0.005% ophthalmic solution: 1 drop(s) to each affected eye once a day (in the evening)

## 2020-08-11 NOTE — DISCHARGE NOTE PROVIDER - NSDCFUADDINST_GEN_ALL_CORE_FT
wound care instructions:  clean ulcer with Dakin solution  Apply santyl to gauze and secure gauze at left lateral heel ulcer area with saira and tape.  change every other day.  keep dressing dry, clean and intact

## 2020-08-11 NOTE — DISCHARGE NOTE PROVIDER - NSDCFUSCHEDAPPT_GEN_ALL_CORE_FT
CONNIE ACOSTA ; 10/19/2020 ; [facility] [address] CONNIE ACOSTA ; 10/19/2020 ; [facility] [address] CONNIE ACOSTA ; 10/19/2020 ; [facility] [address] CONNIE ACOSTA ; 10/19/2020 ; [facility] [address] CONNIE ACOSTA ; 10/19/2020 ; [facility] [address] CONNIE ACOSTA ; 10/19/2020 ; [facility] [address]

## 2020-08-11 NOTE — DISCHARGE NOTE PROVIDER - CARE PROVIDER_API CALL
Tomas Patiño  PODIATRIC MEDICINE AND SURGERY  [address] [place] [address]  Phone: [phone]  Fax: [phone]  Follow Up Time:     Paulo Cardenas  CARDIOVASCULAR DISEASE  [address] [place] [id number]  Phone: [phone]  Fax: [phone]  Follow Up Time:

## 2020-08-11 NOTE — CHART NOTE - NSCHARTNOTEFT_GEN_A_CORE
Source: Patient [ ]  Family [ ]   other [x ]    Current Diet: Diet, Consistent Carbohydrate w/Evening Snack:   DASH/TLC {Sodium & Cholesteral Restricted}  800mL Fluid Restriction ([id number])  Supplement Feeding Modality:  Oral  Glucerna Shake Cans or Servings Per Day:  1       Frequency:  Three Times a day (08-08-20 @ 15:26)    PO intake:  Pt with fair po intake at meals per RN flowsheets    Current Weight:   (8/10) 128 lbs  (8/4) 130 lbs    % Weight Change - possible 2 lb wt loss noted, will continue to monitor.  No edema noted.    Pertinent Medications: MEDICATIONS  (STANDING):  aMIOdarone    Tablet 400 milliGRAM(s) Oral every 8 hours  apixaban 2.5 milliGRAM(s) Oral every 12 hours  ascorbic acid 500 milliGRAM(s) Oral daily  aspirin  chewable 81 milliGRAM(s) Oral daily  atorvastatin 20 milliGRAM(s) Oral at bedtime  collagenase Ointment 1 Application(s) Topical daily  Dakins Solution - 1/2 Strength 1 Application(s) Topical daily  dextrose 5%. 1000 milliLiter(s) (50 mL/Hr) IV Continuous <Continuous>  dextrose 50% Injectable 12.5 Gram(s) IV Push once  dextrose 50% Injectable 25 Gram(s) IV Push once  dextrose 50% Injectable 25 Gram(s) IV Push once  insulin lispro (HumaLOG) corrective regimen sliding scale   SubCutaneous three times a day before meals  insulin lispro (HumaLOG) corrective regimen sliding scale   SubCutaneous at bedtime  latanoprost 0.005% Ophthalmic Solution 1 Drop(s) Both EYES at bedtime  lidocaine 1% Injectable 10 milliLiter(s) Local Injection once  lidocaine 1% Injectable 20 milliLiter(s) Local Injection once  metoprolol succinate ER 25 milliGRAM(s) Oral daily  multivitamin 1 Tablet(s) Oral daily  pantoprazole    Tablet 40 milliGRAM(s) Oral before breakfast  polyethylene glycol 3350 17 Gram(s) Oral daily  povidone iodine 10% Solution 1 Application(s) Topical two times a day  senna 2 Tablet(s) Oral at bedtime  torsemide 10 milliGRAM(s) Oral daily    MEDICATIONS  (PRN):  acetaminophen   Tablet .. 650 milliGRAM(s) Oral every 6 hours PRN Temp greater or equal to 38C (100.4F), Mild Pain (1 - 3), Moderate Pain (4 - 6)  bisacodyl 5 milliGRAM(s) Oral every 12 hours PRN Constipation  bisacodyl Suppository 10 milliGRAM(s) Rectal daily PRN Constipation  dextrose 40% Gel 15 Gram(s) Oral once PRN Blood Glucose LESS THAN 70 milliGRAM(s)/deciliter  glucagon  Injectable 1 milliGRAM(s) IntraMuscular once PRN Glucose LESS THAN 70 milligrams/deciliter  oxycodone    5 mG/acetaminophen 325 mG 1 Tablet(s) Oral every 4 hours PRN Moderate Pain (4 - 6)    Pertinent Labs: CBC Full  -  ( 11 Aug 2020 06:37 )  WBC Count : 6.71 K/uL  RBC Count : 4.00 M/uL  Hemoglobin : 9.9 g/dL  Hematocrit : 34.2 %  Platelet Count - Automated : 198 K/uL  Mean Cell Volume : 85.5 fl  Mean Cell Hemoglobin : 24.8 pg  Mean Cell Hemoglobin Concentration : 28.9 gm/dL  Auto Neutrophil # : 5.06 K/uL  Auto Lymphocyte # : 0.96 K/uL  Auto Monocyte # : 0.59 K/uL  Auto Eosinophil # : 0.05 K/uL  Auto Basophil # : 0.03 K/uL  Auto Neutrophil % : 75.5 %  Auto Lymphocyte % : 14.3 %  Auto Monocyte % : 8.8 %  Auto Eosinophil % : 0.7 %  Auto Basophil % : 0.4 %  08-11 Na130 mmol/L<L> Glu 217 mg/dL<H> K+ 4.6 mmol/L Cr  1.63 mg/dL<H> BUN 40.0 mg/dL<H> Phos 2.9 mg/dL Alb n/a   PAB n/a       Skin: left foot ulcer    Nutrition focused physical exam previously conducted - found signs of malnutrition [ ]absent [x ]present    Subcutaneous fat loss: [x ] Orbital fat pads region, [x ]Buccal fat region, [x ]Triceps region,  [x ]Ribs region    Muscle wasting: [x ]Temples region, [x ]Clavicle region, [x ]Shoulder region, [ ]Scapula region, [ ]Interosseous region,  [ ]thigh region, [ ]Calf region    Current Nutrition Diagnosis: Pt remains at nutrition risk secondary to severe protein calorie malnutrition (acute on chronic) related to inability to meet sufficient protein-energy intake with persistent lack of appetite and early satiety in setting of advanced age, CHF, recurrent pleural effusions and left heel ulcer as evidenced by pt meeting <75% estimated nutrient needs >1 month, severe muscle wasting/fat loss and 7.1% unintentional wt loss x ~2 months.  Pt continues with fair po intake at meals.     Recommendations:   1. Continue with Glucerna TID  2. RX: MVI and Vit C 500mg daily   3. Monitor daily wts     Monitoring and Evaluation:   [x ] PO intake [x ] Tolerance to diet prescription [X] Weights  [X] Follow up per protocol [X] Labs: Source: Patient [ ]  Family [ ]   other [x ]    Current Diet: Diet, Consistent Carbohydrate w/Evening Snack:   DASH/TLC {Sodium & Cholesteral Restricted}  800mL Fluid Restriction ([id number])  Supplement Feeding Modality:  Oral  Glucerna Shake Cans or Servings Per Day:  1       Frequency:  Three Times a day (08-08-20 @ 15:26)    PO intake:  Pt with fair po intake at meals per RN flowsheets    Current Weight:   (8/10) 128 lbs  (8/4) 130 lbs    % Weight Change - possible 2 lb wt loss noted, will continue to monitor.  No edema noted.    Pertinent Medications: MEDICATIONS  (STANDING):  aMIOdarone    Tablet 400 milliGRAM(s) Oral every 8 hours  apixaban 2.5 milliGRAM(s) Oral every 12 hours  ascorbic acid 500 milliGRAM(s) Oral daily  aspirin  chewable 81 milliGRAM(s) Oral daily  atorvastatin 20 milliGRAM(s) Oral at bedtime  collagenase Ointment 1 Application(s) Topical daily  Dakins Solution - 1/2 Strength 1 Application(s) Topical daily  dextrose 5%. 1000 milliLiter(s) (50 mL/Hr) IV Continuous <Continuous>  dextrose 50% Injectable 12.5 Gram(s) IV Push once  dextrose 50% Injectable 25 Gram(s) IV Push once  dextrose 50% Injectable 25 Gram(s) IV Push once  insulin lispro (HumaLOG) corrective regimen sliding scale   SubCutaneous three times a day before meals  insulin lispro (HumaLOG) corrective regimen sliding scale   SubCutaneous at bedtime  latanoprost 0.005% Ophthalmic Solution 1 Drop(s) Both EYES at bedtime  lidocaine 1% Injectable 10 milliLiter(s) Local Injection once  lidocaine 1% Injectable 20 milliLiter(s) Local Injection once  metoprolol succinate ER 25 milliGRAM(s) Oral daily  multivitamin 1 Tablet(s) Oral daily  pantoprazole    Tablet 40 milliGRAM(s) Oral before breakfast  polyethylene glycol 3350 17 Gram(s) Oral daily  povidone iodine 10% Solution 1 Application(s) Topical two times a day  senna 2 Tablet(s) Oral at bedtime  torsemide 10 milliGRAM(s) Oral daily    MEDICATIONS  (PRN):  acetaminophen   Tablet .. 650 milliGRAM(s) Oral every 6 hours PRN Temp greater or equal to 38C (100.4F), Mild Pain (1 - 3), Moderate Pain (4 - 6)  bisacodyl 5 milliGRAM(s) Oral every 12 hours PRN Constipation  bisacodyl Suppository 10 milliGRAM(s) Rectal daily PRN Constipation  dextrose 40% Gel 15 Gram(s) Oral once PRN Blood Glucose LESS THAN 70 milliGRAM(s)/deciliter  glucagon  Injectable 1 milliGRAM(s) IntraMuscular once PRN Glucose LESS THAN 70 milligrams/deciliter  oxycodone    5 mG/acetaminophen 325 mG 1 Tablet(s) Oral every 4 hours PRN Moderate Pain (4 - 6)    Pertinent Labs: CBC Full  -  ( 11 Aug 2020 06:37 )  WBC Count : 6.71 K/uL  RBC Count : 4.00 M/uL  Hemoglobin : 9.9 g/dL  Hematocrit : 34.2 %  Platelet Count - Automated : 198 K/uL  Mean Cell Volume : 85.5 fl  Mean Cell Hemoglobin : 24.8 pg  Mean Cell Hemoglobin Concentration : 28.9 gm/dL  Auto Neutrophil # : 5.06 K/uL  Auto Lymphocyte # : 0.96 K/uL  Auto Monocyte # : 0.59 K/uL  Auto Eosinophil # : 0.05 K/uL  Auto Basophil # : 0.03 K/uL  Auto Neutrophil % : 75.5 %  Auto Lymphocyte % : 14.3 %  Auto Monocyte % : 8.8 %  Auto Eosinophil % : 0.7 %  Auto Basophil % : 0.4 %  08-11 Na130 mmol/L<L> Glu 217 mg/dL<H> K+ 4.6 mmol/L Cr  1.63 mg/dL<H> BUN 40.0 mg/dL<H> Phos 2.9 mg/dL Alb n/a   PAB n/a       Skin: left foot ulcer    Nutrition focused physical exam previously conducted - found signs of malnutrition [ ]absent [x ]present    Subcutaneous fat loss: [x ] Orbital fat pads region, [x ]Buccal fat region, [x ]Triceps region,  [x ]Ribs region    Muscle wasting: [x ]Temples region, [x ]Clavicle region, [x ]Shoulder region, [ ]Scapula region, [ ]Interosseous region,  [ ]thigh region, [ ]Calf region    Current Nutrition Diagnosis: Pt remains at nutrition risk secondary to severe protein calorie malnutrition (acute on chronic) related to inability to meet sufficient protein-energy intake with persistent lack of appetite and early satiety in setting of advanced age, CHF, recurrent pleural effusions and left heel ulcer as evidenced by pt meeting <75% estimated nutrient needs >1 month, severe muscle wasting/fat loss and 7.1% unintentional wt loss x ~2 months.  Pt continues with fair po intake at meals.     Recommendations:   1. Continue with Glucerna TID  2. Continue with MVI and Vit C 500mg daily   3. Monitor daily wts     Monitoring and Evaluation:   [x ] PO intake [x ] Tolerance to diet prescription [X] Weights  [X] Follow up per protocol [X] Labs:

## 2020-08-11 NOTE — PROGRESS NOTE ADULT - SUBJECTIVE AND OBJECTIVE BOX
SUBJECTIVE:  Cardiology NP F/U note:   Dr. Cardenas F/U Pleural Effusions/ AF  denies complaints of chest pain/sob/dizziness/palps  "feels Tired" Weak  appetite poor"not eating well" Full of aches and pains.  "No BM in 4 days"    	  MEDICATIONS  (STANDING):  aMIOdarone    Tablet 400 milliGRAM(s) Oral every 8 hours  apixaban 2.5 milliGRAM(s) Oral every 12 hours  ascorbic acid 500 milliGRAM(s) Oral daily  aspirin  chewable 81 milliGRAM(s) Oral daily  atorvastatin 20 milliGRAM(s) Oral at bedtime  collagenase Ointment 1 Application(s) Topical daily  Dakins Solution - 1/2 Strength 1 Application(s) Topical daily  dextrose 5%. 1000 milliLiter(s) IV Continuous <Continuous>  dextrose 50% Injectable 12.5 Gram(s) IV Push once  dextrose 50% Injectable 25 Gram(s) IV Push once  dextrose 50% Injectable 25 Gram(s) IV Push once  insulin lispro (HumaLOG) corrective regimen sliding scale   SubCutaneous three times a day before meals  insulin lispro (HumaLOG) corrective regimen sliding scale   SubCutaneous at bedtime  latanoprost 0.005% Ophthalmic Solution 1 Drop(s) Both EYES at bedtime  lidocaine 1% Injectable 10 milliLiter(s) Local Injection once  lidocaine 1% Injectable 20 milliLiter(s) Local Injection once  metoprolol succinate ER 25 milliGRAM(s) Oral daily  multivitamin 1 Tablet(s) Oral daily  pantoprazole    Tablet 40 milliGRAM(s) Oral before breakfast  polyethylene glycol 3350 17 Gram(s) Oral daily  povidone iodine 10% Solution 1 Application(s) Topical two times a day  senna 2 Tablet(s) Oral at bedtime  torsemide 10 milliGRAM(s) Oral daily    MEDICATIONS  (PRN):  acetaminophen   Tablet .. 650 milliGRAM(s) Oral every 6 hours PRN Temp greater or equal to 38C (100.4F), Mild Pain (1 - 3), Moderate Pain (4 - 6)  bisacodyl 5 milliGRAM(s) Oral every 12 hours PRN Constipation  bisacodyl Suppository 10 milliGRAM(s) Rectal daily PRN Constipation  dextrose 40% Gel 15 Gram(s) Oral once PRN Blood Glucose LESS THAN 70 milliGRAM(s)/deciliter  glucagon  Injectable 1 milliGRAM(s) IntraMuscular once PRN Glucose LESS THAN 70 milligrams/deciliter  oxycodone    5 mG/acetaminophen 325 mG 1 Tablet(s) Oral every 4 hours PRN Moderate Pain (4 - 6)      PHYSICAL EXAM:    T(C): 36.4 (08-11-20 @ 05:55), Max: 36.9 (08-10-20 @ 22:15)  HR: 94 (08-11-20 @ 08:36) (92 - 108)  BP: 98/59 (08-11-20 @ 08:36) (83/51 - 101/57)  RR: 18 (08-11-20 @ 05:55) (18 - 18)  SpO2: 99% (08-11-20 @ 05:55) (98% - 99%)  Wt(kg): --    I&O's Summary    10 Aug 2020 07:01  -  11 Aug 2020 07:00  --------------------------------------------------------  IN: 1100 mL / OUT: 750 mL / NET: 350 mL        Daily     Daily     Appearance: uncomfortable, in Bed, 	  Cardiovascular: Normal S1 S2, IRREG 60's  No JVD, No murmurs, No edema  Respiratory: Lungs clear to auscultation	  Psychiatry: A & O x 3, Anxious  Gastrointestinal:  Soft, Non-tender, + BS	  Skin: warm and dry  Neurologic: Non-focal  Extremities: Normal range of motion,:  Vascular: Peripheral pulses palpable 2+ bilaterally    TELEMETRY: 	AF 60's PVCs     ECG:   pending 	  RADIOLOGY:   DIAGNOSTIC TESTING:  [ ] Echocardiogram:  < from: TTE Echo Complete w/o Contrast w/ Doppler (08.04.20 @ 08:49) >   1. Left ventricular ejection fraction, by visual estimation, is 45 to 50%.   2. Mildly decreased global left ventricular systolic function.   3. Mildly increased LV wall thickness.   4. Normal left ventricular internal cavity size.   5. Spectral Doppler shows pseudonormal pattern of left ventricular myocardial filling (Grade II diastolic dysfunction).   6. Large pleural effusion in both left and right lateral regions.   7. There is no evidence of pericardial effusion.   8. Mild mitral valve regurgitation.   9. Thickening and calcification of the anterior and posterior mitral valve leaflets.  10. Moderate tricuspid regurgitation.  11. Estimated pulmonary artery systolic pressure is 55.3 mmHg assuming a right atrial pressure of 8 mmHg, which is consistent with moderate pulmonary hypertension.  12. Mitral valve mean gradient is 4.0 mmHg consistent with mild mitral stenosis.  13. The aortic valve mean gradient is 3.0 mmHg consistent with normally opening aortic valve.    [ ]  Catheterization:  < from: Cardiac Cath Lab - Adult (08.05.20 @ 16:42) >  VENTRICLES: No left ventriculogram was performed.  CORONARY VESSELS: The coronary circulation is right dominant.  LM:   --  LM: The vessel was normal sized. Angiography showed minor luminal  irregularities with no flow limiting lesions.  LAD:   --  LAD: The vessel was normal sized and moderately calcified.  Angiography showed moderate atherosclerosis.  --  Mid LAD: There was a discrete 50 % stenosis.  CX:   --  Circumflex: The vessel was large sized (dominant).  --  Ostial circumflex: There was a discrete 40 % stenosis.  --  Distal circumflex: There was a discrete 60 % stenosis. It does not  appear amenable to intervention.  --  OM1: The vessel was normal sized. There was a diffuse 40 % stenosis.  RCA:   --  RCA: Normal. The vessel was very small sized.  COMPLICATIONS: No complications occurred during the cath lab visit.  DIAGNOSTIC RECOMMENDATIONS: Overall non-obstructive CAD. Would maximize  medical therapy for diastolic CHF and CAD.  	 	                                  9.9    6.71  )-----------( 198      ( 11 Aug 2020 06:37 )             34.2     08-11    130<L>  |  90<L>  |  40.0<H>  ----------------------------<  217<H>  4.6   |  26.0  |  1.63<H>    Ca    8.2<L>      11 Aug 2020 06:37  Phos  2.9     08-11  Mg     1.9     08-11        ASSESSMENT:  87y old  Male with PMH/ HFpEF, CAD, HTN, HLD, DM type 2 on oral hypoglycemics, CKD stage III; rib fractures complicated by L-sided pleural effusion, requiring thoracentesis ~2 years ago; B/L pleural effusions, s/p L thoracentesis- 01/02/20, transudative, chronic lightheadedness, GERD, who presents with a chief complaint of MEJÍA and gait instability. Pt states that since about 2 mos ago he has been feeling short of breath, more with exertion -- walking around his apartment or with bending to put his shoes on. Pt states that today his visiting nurse became concerned about his breathing, him getting "winded" with minimal exertion and abnormal lung sounds. Pt states that he has been feeling the same since about 2 mos ago. Pt states that sometimes he feels chest burning if he eats late or he eats heavy meal, especially meat. pt states that he lost about 10 lbs since about 2 mos ago due to has been taking Abx for L heal ulcer and his appetite has been poor. Pt states that his doctor discontinued BP medications 2/2 low BP.   -as above pt had [facility] 8/5/20-Non obst CAD  -Pleural effusions/improved,  Right with CT removed yesterday    Plan: SUBJECTIVE:  Cardiology NP F/U note:   Dr. Cardenas F/U Pleural Effusions/ AF  denies complaints of chest pain/sob/dizziness/palps  "feels Tired" Weak  appetite poor"not eating well" Full of aches and pains.  "No BM in 4 days"    	  MEDICATIONS  (STANDING):  aMIOdarone    Tablet 400 milliGRAM(s) Oral every 8 hours  apixaban 2.5 milliGRAM(s) Oral every 12 hours  ascorbic acid 500 milliGRAM(s) Oral daily  aspirin  chewable 81 milliGRAM(s) Oral daily  atorvastatin 20 milliGRAM(s) Oral at bedtime  collagenase Ointment 1 Application(s) Topical daily  Dakins Solution - 1/2 Strength 1 Application(s) Topical daily  dextrose 5%. 1000 milliLiter(s) IV Continuous <Continuous>  dextrose 50% Injectable 12.5 Gram(s) IV Push once  dextrose 50% Injectable 25 Gram(s) IV Push once  dextrose 50% Injectable 25 Gram(s) IV Push once  insulin lispro (HumaLOG) corrective regimen sliding scale   SubCutaneous three times a day before meals  insulin lispro (HumaLOG) corrective regimen sliding scale   SubCutaneous at bedtime  latanoprost 0.005% Ophthalmic Solution 1 Drop(s) Both EYES at bedtime  lidocaine 1% Injectable 10 milliLiter(s) Local Injection once  lidocaine 1% Injectable 20 milliLiter(s) Local Injection once  metoprolol succinate ER 25 milliGRAM(s) Oral daily  multivitamin 1 Tablet(s) Oral daily  pantoprazole    Tablet 40 milliGRAM(s) Oral before breakfast  polyethylene glycol 3350 17 Gram(s) Oral daily  povidone iodine 10% Solution 1 Application(s) Topical two times a day  senna 2 Tablet(s) Oral at bedtime  torsemide 10 milliGRAM(s) Oral daily    MEDICATIONS  (PRN):  acetaminophen   Tablet .. 650 milliGRAM(s) Oral every 6 hours PRN Temp greater or equal to 38C (100.4F), Mild Pain (1 - 3), Moderate Pain (4 - 6)  bisacodyl 5 milliGRAM(s) Oral every 12 hours PRN Constipation  bisacodyl Suppository 10 milliGRAM(s) Rectal daily PRN Constipation  dextrose 40% Gel 15 Gram(s) Oral once PRN Blood Glucose LESS THAN 70 milliGRAM(s)/deciliter  glucagon  Injectable 1 milliGRAM(s) IntraMuscular once PRN Glucose LESS THAN 70 milligrams/deciliter  oxycodone    5 mG/acetaminophen 325 mG 1 Tablet(s) Oral every 4 hours PRN Moderate Pain (4 - 6)      PHYSICAL EXAM:    T(C): 36.4 (08-11-20 @ 05:55), Max: 36.9 (08-10-20 @ 22:15)  HR: 94 (08-11-20 @ 08:36) (92 - 108)  BP: 98/59 (08-11-20 @ 08:36) (83/51 - 101/57)  RR: 18 (08-11-20 @ 05:55) (18 - 18)  SpO2: 99% (08-11-20 @ 05:55) (98% - 99%)  Wt(kg): --    I&O's Summary    10 Aug 2020 07:01  -  11 Aug 2020 07:00  --------------------------------------------------------  IN: 1100 mL / OUT: 750 mL / NET: 350 mL        Daily     Daily     Appearance: uncomfortable, in Bed frail / cachectic   Cardiovascular: Normal S1 S2, IRREG 60's  No JVD, No murmurs, No edema  Respiratory: Lungs clear to auscultation	  Psychiatry: A & O x 3, Anxious  Gastrointestinal:  Soft, Non-tender, + BS	  Skin: warm and dry  Neurologic: Non-focal  Extremities: Normal range of motion,:  Vascular: Peripheral pulses palpable 2+ bilaterally    TELEMETRY: 	AF 60's PVCs     ECG:   pending 	  RADIOLOGY:   DIAGNOSTIC TESTING:  [ ] Echocardiogram:  < from: TTE Echo Complete w/o Contrast w/ Doppler (08.04.20 @ 08:49) >   1. Left ventricular ejection fraction, by visual estimation, is 45 to 50%.   2. Mildly decreased global left ventricular systolic function.   3. Mildly increased LV wall thickness.   4. Normal left ventricular internal cavity size.   5. Spectral Doppler shows pseudonormal pattern of left ventricular myocardial filling (Grade II diastolic dysfunction).   6. Large pleural effusion in both left and right lateral regions.   7. There is no evidence of pericardial effusion.   8. Mild mitral valve regurgitation.   9. Thickening and calcification of the anterior and posterior mitral valve leaflets.  10. Moderate tricuspid regurgitation.  11. Estimated pulmonary artery systolic pressure is 55.3 mmHg assuming a right atrial pressure of 8 mmHg, which is consistent with moderate pulmonary hypertension.  12. Mitral valve mean gradient is 4.0 mmHg consistent with mild mitral stenosis.  13. The aortic valve mean gradient is 3.0 mmHg consistent with normally opening aortic valve.    [ ]  Catheterization:  < from: Cardiac Cath Lab - Adult (08.05.20 @ 16:42) >  VENTRICLES: No left ventriculogram was performed.  CORONARY VESSELS: The coronary circulation is right dominant.  LM:   --  LM: The vessel was normal sized. Angiography showed minor luminal  irregularities with no flow limiting lesions.  LAD:   --  LAD: The vessel was normal sized and moderately calcified.  Angiography showed moderate atherosclerosis.  --  Mid LAD: There was a discrete 50 % stenosis.  CX:   --  Circumflex: The vessel was large sized (dominant).  --  Ostial circumflex: There was a discrete 40 % stenosis.  --  Distal circumflex: There was a discrete 60 % stenosis. It does not  appear amenable to intervention.  --  OM1: The vessel was normal sized. There was a diffuse 40 % stenosis.  RCA:   --  RCA: Normal. The vessel was very small sized.  COMPLICATIONS: No complications occurred during the cath lab visit.  DIAGNOSTIC RECOMMENDATIONS: Overall non-obstructive CAD. Would maximize  medical therapy for diastolic CHF and CAD.  	 	                                  9.9    6.71  )-----------( 198      ( 11 Aug 2020 06:37 )             34.2     08-11    130<L>  |  90<L>  |  40.0<H>  ----------------------------<  217<H>  4.6   |  26.0  |  1.63<H>    Ca    8.2<L>      11 Aug 2020 06:37  Phos  2.9     08-11  Mg     1.9     08-11        ASSESSMENT:  87y old  Male with PMH/ HFpEF, CAD, HTN, HLD, DM type 2 on oral hypoglycemics, CKD stage III; rib fractures complicated by L-sided pleural effusion, requiring thoracentesis ~2 years ago; B/L pleural effusions, s/p L thoracentesis- 01/02/20, transudative, chronic lightheadedness, GERD, who presents with a chief complaint of MEJÍA and gait instability. Pt states that since about 2 mos ago he has been feeling short of breath, more with exertion -- walking around his apartment or with bending to put his shoes on. Pt states that today his visiting nurse became concerned about his breathing, him getting "winded" with minimal exertion and abnormal lung sounds. Pt states that he has been feeling the same since about 2 mos ago. Pt states that sometimes he feels chest burning if he eats late or he eats heavy meal, especially meat. pt states that he lost about 10 lbs since about 2 mos ago due to has been taking Abx for L heal ulcer and his appetite has been poor. Pt states that his doctor discontinued BP medications 2/2 low BP.   -as above pt had [facility] 8/5/20-Non obst CAD  -Pleural effusions/improved,  Right with CT removed yesterday/no need for Pleurex per CT S  -AF rate controlled / Eliquis started/ tolerating Amio so far.  -Constipated  -poor appetite  Plan:  continue current meds and management    discussed amiodarone with Dr. Cardenas / 400 q 8 x 3days/ then 400mg BID x 3days then 200mg daily   Eliquis 2.5 Bid   Bowel regimime started   consider ensure supplements   pain control/ tylenol  encourage OOB   will most likely need JANAE/ rehab SUBJECTIVE:  Cardiology NP F/U note:   Dr. Cardenas F/U Pleural Effusions/ AF  denies complaints of chest pain/sob/dizziness/palps  "feels Tired" Weak  appetite poor"not eating well" Full of aches and pains.  "No BM in 4 days"    	  MEDICATIONS  (STANDING):  aMIOdarone    Tablet 400 milliGRAM(s) Oral every 8 hours  apixaban 2.5 milliGRAM(s) Oral every 12 hours  ascorbic acid 500 milliGRAM(s) Oral daily  aspirin  chewable 81 milliGRAM(s) Oral daily  atorvastatin 20 milliGRAM(s) Oral at bedtime  collagenase Ointment 1 Application(s) Topical daily  Dakins Solution - 1/2 Strength 1 Application(s) Topical daily  dextrose 5%. 1000 milliLiter(s) IV Continuous <Continuous>  dextrose 50% Injectable 12.5 Gram(s) IV Push once  dextrose 50% Injectable 25 Gram(s) IV Push once  dextrose 50% Injectable 25 Gram(s) IV Push once  insulin lispro (HumaLOG) corrective regimen sliding scale   SubCutaneous three times a day before meals  insulin lispro (HumaLOG) corrective regimen sliding scale   SubCutaneous at bedtime  latanoprost 0.005% Ophthalmic Solution 1 Drop(s) Both EYES at bedtime  lidocaine 1% Injectable 10 milliLiter(s) Local Injection once  lidocaine 1% Injectable 20 milliLiter(s) Local Injection once  metoprolol succinate ER 25 milliGRAM(s) Oral daily  multivitamin 1 Tablet(s) Oral daily  pantoprazole    Tablet 40 milliGRAM(s) Oral before breakfast  polyethylene glycol 3350 17 Gram(s) Oral daily  povidone iodine 10% Solution 1 Application(s) Topical two times a day  senna 2 Tablet(s) Oral at bedtime  torsemide 10 milliGRAM(s) Oral daily    MEDICATIONS  (PRN):  acetaminophen   Tablet .. 650 milliGRAM(s) Oral every 6 hours PRN Temp greater or equal to 38C (100.4F), Mild Pain (1 - 3), Moderate Pain (4 - 6)  bisacodyl 5 milliGRAM(s) Oral every 12 hours PRN Constipation  bisacodyl Suppository 10 milliGRAM(s) Rectal daily PRN Constipation  dextrose 40% Gel 15 Gram(s) Oral once PRN Blood Glucose LESS THAN 70 milliGRAM(s)/deciliter  glucagon  Injectable 1 milliGRAM(s) IntraMuscular once PRN Glucose LESS THAN 70 milligrams/deciliter  oxycodone    5 mG/acetaminophen 325 mG 1 Tablet(s) Oral every 4 hours PRN Moderate Pain (4 - 6)      PHYSICAL EXAM:    T(C): 36.4 (08-11-20 @ 05:55), Max: 36.9 (08-10-20 @ 22:15)  HR: 94 (08-11-20 @ 08:36) (92 - 108)  BP: 98/59 (08-11-20 @ 08:36) (83/51 - 101/57)  RR: 18 (08-11-20 @ 05:55) (18 - 18)  SpO2: 99% (08-11-20 @ 05:55) (98% - 99%)  Wt(kg): --    I&O's Summary    10 Aug 2020 07:01  -  11 Aug 2020 07:00  --------------------------------------------------------  IN: 1100 mL / OUT: 750 mL / NET: 350 mL    Appearance: uncomfortable, in Bed frail / cachectic   Cardiovascular: Normal S1 S2, IRREG 60's  No JVD, No murmurs, No edema  Respiratory: Lungs clear to auscultation	  Psychiatry: A & O x 3, Anxious  Gastrointestinal:  Soft, Non-tender, + BS	  Skin: warm and dry  Neurologic: Non-focal  Extremities: Normal range of motion,:  Vascular: Peripheral pulses palpable 2+ bilaterally    TELEMETRY: 	AF 60's PVCs     ECG:   pending 	  RADIOLOGY:   DIAGNOSTIC TESTING:  [ ] Echocardiogram:  < from: TTE Echo Complete w/o Contrast w/ Doppler (08.04.20 @ 08:49) >   1. Left ventricular ejection fraction, by visual estimation, is 45 to 50%.   2. Mildly decreased global left ventricular systolic function.   3. Mildly increased LV wall thickness.   4. Normal left ventricular internal cavity size.   5. Spectral Doppler shows pseudonormal pattern of left ventricular myocardial filling (Grade II diastolic dysfunction).   6. Large pleural effusion in both left and right lateral regions.   7. There is no evidence of pericardial effusion.   8. Mild mitral valve regurgitation.   9. Thickening and calcification of the anterior and posterior mitral valve leaflets.  10. Moderate tricuspid regurgitation.  11. Estimated pulmonary artery systolic pressure is 55.3 mmHg assuming a right atrial pressure of 8 mmHg, which is consistent with moderate pulmonary hypertension.  12. Mitral valve mean gradient is 4.0 mmHg consistent with mild mitral stenosis.  13. The aortic valve mean gradient is 3.0 mmHg consistent with normally opening aortic valve.    [ ]  Catheterization:  < from: Cardiac Cath Lab - Adult (08.05.20 @ 16:42) >  VENTRICLES: No left ventriculogram was performed.  CORONARY VESSELS: The coronary circulation is right dominant.  LM:   --  LM: The vessel was normal sized. Angiography showed minor luminal  irregularities with no flow limiting lesions.  LAD:   --  LAD: The vessel was normal sized and moderately calcified.  Angiography showed moderate atherosclerosis.  --  Mid LAD: There was a discrete 50 % stenosis.  CX:   --  Circumflex: The vessel was large sized (dominant).  --  Ostial circumflex: There was a discrete 40 % stenosis.  --  Distal circumflex: There was a discrete 60 % stenosis. It does not  appear amenable to intervention.  --  OM1: The vessel was normal sized. There was a diffuse 40 % stenosis.  RCA:   --  RCA: Normal. The vessel was very small sized.  COMPLICATIONS: No complications occurred during the cath lab visit.  DIAGNOSTIC RECOMMENDATIONS: Overall non-obstructive CAD. Would maximize  medical therapy for diastolic CHF and CAD.  	                       9.9    6.71  )-----------( 198      ( 11 Aug 2020 06:37 )             34.2     08-11    130<L>  |  90<L>  |  40.0<H>  ----------------------------<  217<H>  4.6   |  26.0  |  1.63<H>    Ca    8.2<L>      11 Aug 2020 06:37  Phos  2.9     08-11  Mg     1.9     08-11    ASSESSMENT:  87y old  Male with PMH/ HFpEF, CAD, HTN, HLD, DM type 2 on oral hypoglycemics, CKD stage III; rib fractures complicated by L-sided pleural effusion, requiring thoracentesis ~2 years ago; B/L pleural effusions, s/p L thoracentesis- 01/02/20, transudative, chronic lightheadedness, GERD, who presents with a chief complaint of MEJÍA and gait instability. Pt states that since about 2 mos ago he has been feeling short of breath, more with exertion -- walking around his apartment or with bending to put his shoes on. Pt states that today his visiting nurse became concerned about his breathing, him getting "winded" with minimal exertion and abnormal lung sounds. Pt states that he has been feeling the same since about 2 mos ago. Pt states that sometimes he feels chest burning if he eats late or he eats heavy meal, especially meat. pt states that he lost about 10 lbs since about 2 mos ago due to has been taking Abx for L heal ulcer and his appetite has been poor. Pt states that his doctor discontinued BP medications 2/2 low BP.   -as above pt had [facility] 8/5/20-Non obst CAD  -Pleural effusions/improved,  Right with CT removed yesterday/no need for Pleurex per CT S  -AF rate controlled / Eliquis started/ tolerating Amio so far.  -Constipated  -poor appetite  Plan:  continue current meds and management    discussed amiodarone with Dr. Cardenas / 400 q 8 x 3days/ then 400mg BID x 3days then 200mg daily   Eliquis 2.5 Bid   Bowel regimime started   consider ensure supplements   pain control/ tylenol  encourage OOB   will most likely need JANAE/ rehab

## 2020-08-11 NOTE — DISCHARGE NOTE PROVIDER - NSDCCPCAREPLAN_GEN_ALL_CORE_FT
PRINCIPAL DISCHARGE DIAGNOSIS  Diagnosis: CHF (congestive heart failure)  Assessment and Plan of Treatment: Follow up with your primary doctor & cardiology within 1 week of discharge PRINCIPAL DISCHARGE DIAGNOSIS  Diagnosis: CHF (congestive heart failure)  Assessment and Plan of Treatment: Follow up with your primary doctor & cardiology within 1 week of discharge. Continue with cardiac meds as prescribed. Metoprolol decreased to 12.5mg oral once a day. Contniue with amiodorone and torsemide as presribed.

## 2020-08-12 LAB
ANION GAP SERPL CALC-SCNC: 15 MMOL/L — SIGNIFICANT CHANGE UP (ref 5–17)
BASOPHILS # BLD AUTO: 0.02 K/UL — SIGNIFICANT CHANGE UP (ref 0–0.2)
BASOPHILS NFR BLD AUTO: 0.2 % — SIGNIFICANT CHANGE UP (ref 0–2)
BUN SERPL-MCNC: 41 MG/DL — HIGH (ref 8–20)
CALCIUM SERPL-MCNC: 8.5 MG/DL — LOW (ref 8.6–10.2)
CHLORIDE SERPL-SCNC: 92 MMOL/L — LOW (ref 98–107)
CO2 SERPL-SCNC: 25 MMOL/L — SIGNIFICANT CHANGE UP (ref 22–29)
CREAT SERPL-MCNC: 1.64 MG/DL — HIGH (ref 0.5–1.3)
EOSINOPHIL # BLD AUTO: 0.04 K/UL — SIGNIFICANT CHANGE UP (ref 0–0.5)
EOSINOPHIL NFR BLD AUTO: 0.4 % — SIGNIFICANT CHANGE UP (ref 0–6)
GLUCOSE BLDC GLUCOMTR-MCNC: 120 MG/DL — HIGH (ref 70–99)
GLUCOSE BLDC GLUCOMTR-MCNC: 129 MG/DL — HIGH (ref 70–99)
GLUCOSE BLDC GLUCOMTR-MCNC: 152 MG/DL — HIGH (ref 70–99)
GLUCOSE SERPL-MCNC: 118 MG/DL — HIGH (ref 70–99)
HCT VFR BLD CALC: 32.7 % — LOW (ref 39–50)
HGB BLD-MCNC: 9.5 G/DL — LOW (ref 13–17)
IMM GRANULOCYTES NFR BLD AUTO: 0.3 % — SIGNIFICANT CHANGE UP (ref 0–1.5)
LYMPHOCYTES # BLD AUTO: 0.78 K/UL — LOW (ref 1–3.3)
LYMPHOCYTES # BLD AUTO: 8.7 % — LOW (ref 13–44)
MAGNESIUM SERPL-MCNC: 2 MG/DL — SIGNIFICANT CHANGE UP (ref 1.8–2.6)
MCHC RBC-ENTMCNC: 24.5 PG — LOW (ref 27–34)
MCHC RBC-ENTMCNC: 29.1 GM/DL — LOW (ref 32–36)
MCV RBC AUTO: 84.5 FL — SIGNIFICANT CHANGE UP (ref 80–100)
MONOCYTES # BLD AUTO: 0.59 K/UL — SIGNIFICANT CHANGE UP (ref 0–0.9)
MONOCYTES NFR BLD AUTO: 6.6 % — SIGNIFICANT CHANGE UP (ref 2–14)
NEUTROPHILS # BLD AUTO: 7.46 K/UL — HIGH (ref 1.8–7.4)
NEUTROPHILS NFR BLD AUTO: 83.8 % — HIGH (ref 43–77)
PHOSPHATE SERPL-MCNC: 2.8 MG/DL — SIGNIFICANT CHANGE UP (ref 2.4–4.7)
PLATELET # BLD AUTO: 211 K/UL — SIGNIFICANT CHANGE UP (ref 150–400)
POTASSIUM SERPL-MCNC: 4.8 MMOL/L — SIGNIFICANT CHANGE UP (ref 3.5–5.3)
POTASSIUM SERPL-SCNC: 4.8 MMOL/L — SIGNIFICANT CHANGE UP (ref 3.5–5.3)
RBC # BLD: 3.87 M/UL — LOW (ref 4.2–5.8)
RBC # FLD: 23.3 % — HIGH (ref 10.3–14.5)
SODIUM SERPL-SCNC: 132 MMOL/L — LOW (ref 135–145)
WBC # BLD: 8.92 K/UL — SIGNIFICANT CHANGE UP (ref 3.8–10.5)
WBC # FLD AUTO: 8.92 K/UL — SIGNIFICANT CHANGE UP (ref 3.8–10.5)

## 2020-08-12 PROCEDURE — C1769: CPT

## 2020-08-12 PROCEDURE — 99239 HOSP IP/OBS DSCHRG MGMT >30: CPT

## 2020-08-12 PROCEDURE — 87075 CULTR BACTERIA EXCEPT BLOOD: CPT

## 2020-08-12 PROCEDURE — 87070 CULTURE OTHR SPECIMN AEROBIC: CPT

## 2020-08-12 PROCEDURE — 84100 ASSAY OF PHOSPHORUS: CPT

## 2020-08-12 PROCEDURE — 82550 ASSAY OF CK (CPK): CPT

## 2020-08-12 PROCEDURE — 93458 L HRT ARTERY/VENTRICLE ANGIO: CPT

## 2020-08-12 PROCEDURE — 71250 CT THORAX DX C-: CPT

## 2020-08-12 PROCEDURE — G0378: CPT

## 2020-08-12 PROCEDURE — 93923 UPR/LXTR ART STDY 3+ LVLS: CPT

## 2020-08-12 PROCEDURE — C1887: CPT

## 2020-08-12 PROCEDURE — 93306 TTE W/DOPPLER COMPLETE: CPT

## 2020-08-12 PROCEDURE — 82272 OCCULT BLD FECES 1-3 TESTS: CPT

## 2020-08-12 PROCEDURE — 93971 EXTREMITY STUDY: CPT

## 2020-08-12 PROCEDURE — 87635 SARS-COV-2 COVID-19 AMP PRB: CPT

## 2020-08-12 PROCEDURE — 85610 PROTHROMBIN TIME: CPT

## 2020-08-12 PROCEDURE — 82607 VITAMIN B-12: CPT

## 2020-08-12 PROCEDURE — 36415 COLL VENOUS BLD VENIPUNCTURE: CPT

## 2020-08-12 PROCEDURE — 83735 ASSAY OF MAGNESIUM: CPT

## 2020-08-12 PROCEDURE — U0003: CPT

## 2020-08-12 PROCEDURE — 82945 GLUCOSE OTHER FLUID: CPT

## 2020-08-12 PROCEDURE — C1894: CPT

## 2020-08-12 PROCEDURE — 99285 EMERGENCY DEPT VISIT HI MDM: CPT | Mod: 25

## 2020-08-12 PROCEDURE — 88112 CYTOPATH CELL ENHANCE TECH: CPT

## 2020-08-12 PROCEDURE — 83540 ASSAY OF IRON: CPT

## 2020-08-12 PROCEDURE — 84443 ASSAY THYROID STIM HORMONE: CPT

## 2020-08-12 PROCEDURE — 85027 COMPLETE CBC AUTOMATED: CPT

## 2020-08-12 PROCEDURE — 83550 IRON BINDING TEST: CPT

## 2020-08-12 PROCEDURE — 87102 FUNGUS ISOLATION CULTURE: CPT

## 2020-08-12 PROCEDURE — 82728 ASSAY OF FERRITIN: CPT

## 2020-08-12 PROCEDURE — 84484 ASSAY OF TROPONIN QUANT: CPT

## 2020-08-12 PROCEDURE — 83615 LACTATE (LD) (LDH) ENZYME: CPT

## 2020-08-12 PROCEDURE — 83010 ASSAY OF HAPTOGLOBIN QUANT: CPT

## 2020-08-12 PROCEDURE — 83036 HEMOGLOBIN GLYCOSYLATED A1C: CPT

## 2020-08-12 PROCEDURE — 85045 AUTOMATED RETICULOCYTE COUNT: CPT

## 2020-08-12 PROCEDURE — 80048 BASIC METABOLIC PNL TOTAL CA: CPT

## 2020-08-12 PROCEDURE — 97530 THERAPEUTIC ACTIVITIES: CPT

## 2020-08-12 PROCEDURE — 82962 GLUCOSE BLOOD TEST: CPT

## 2020-08-12 PROCEDURE — 88305 TISSUE EXAM BY PATHOLOGIST: CPT

## 2020-08-12 PROCEDURE — 82746 ASSAY OF FOLIC ACID SERUM: CPT

## 2020-08-12 PROCEDURE — 80061 LIPID PANEL: CPT

## 2020-08-12 PROCEDURE — 71045 X-RAY EXAM CHEST 1 VIEW: CPT

## 2020-08-12 PROCEDURE — 80053 COMPREHEN METABOLIC PANEL: CPT

## 2020-08-12 PROCEDURE — 93005 ELECTROCARDIOGRAM TRACING: CPT

## 2020-08-12 PROCEDURE — 89051 BODY FLUID CELL COUNT: CPT

## 2020-08-12 PROCEDURE — 99152 MOD SED SAME PHYS/QHP 5/>YRS: CPT

## 2020-08-12 PROCEDURE — 85730 THROMBOPLASTIN TIME PARTIAL: CPT

## 2020-08-12 PROCEDURE — 73630 X-RAY EXAM OF FOOT: CPT

## 2020-08-12 PROCEDURE — 97116 GAIT TRAINING THERAPY: CPT

## 2020-08-12 PROCEDURE — 83880 ASSAY OF NATRIURETIC PEPTIDE: CPT

## 2020-08-12 PROCEDURE — 87205 SMEAR GRAM STAIN: CPT

## 2020-08-12 PROCEDURE — 83986 ASSAY PH BODY FLUID NOS: CPT

## 2020-08-12 PROCEDURE — 87186 SC STD MICRODIL/AGAR DIL: CPT

## 2020-08-12 PROCEDURE — 86769 SARS-COV-2 COVID-19 ANTIBODY: CPT

## 2020-08-12 PROCEDURE — 84157 ASSAY OF PROTEIN OTHER: CPT

## 2020-08-12 PROCEDURE — 84466 ASSAY OF TRANSFERRIN: CPT

## 2020-08-12 PROCEDURE — 96374 THER/PROPH/DIAG INJ IV PUSH: CPT

## 2020-08-12 PROCEDURE — 82042 OTHER SOURCE ALBUMIN QUAN EA: CPT

## 2020-08-12 RX ORDER — METOPROLOL TARTRATE 50 MG
0.5 TABLET ORAL
Qty: 15 | Refills: 0
Start: 2020-08-12 | End: 2020-09-10

## 2020-08-12 RX ADMIN — Medication 1 TABLET(S): at 11:35

## 2020-08-12 RX ADMIN — Medication 1: at 11:37

## 2020-08-12 RX ADMIN — Medication 650 MILLIGRAM(S): at 11:37

## 2020-08-12 RX ADMIN — MAGNESIUM OXIDE 400 MG ORAL TABLET 400 MILLIGRAM(S): 241.3 TABLET ORAL at 11:35

## 2020-08-12 RX ADMIN — POLYETHYLENE GLYCOL 3350 17 GRAM(S): 17 POWDER, FOR SOLUTION ORAL at 11:35

## 2020-08-12 RX ADMIN — Medication 500 MILLIGRAM(S): at 11:35

## 2020-08-12 RX ADMIN — Medication 1 APPLICATION(S): at 11:22

## 2020-08-12 RX ADMIN — AMIODARONE HYDROCHLORIDE 400 MILLIGRAM(S): 400 TABLET ORAL at 13:44

## 2020-08-12 RX ADMIN — APIXABAN 2.5 MILLIGRAM(S): 2.5 TABLET, FILM COATED ORAL at 05:26

## 2020-08-12 RX ADMIN — Medication 650 MILLIGRAM(S): at 12:30

## 2020-08-12 RX ADMIN — Medication 81 MILLIGRAM(S): at 11:35

## 2020-08-12 RX ADMIN — PANTOPRAZOLE SODIUM 40 MILLIGRAM(S): 20 TABLET, DELAYED RELEASE ORAL at 05:26

## 2020-08-12 RX ADMIN — AMIODARONE HYDROCHLORIDE 400 MILLIGRAM(S): 400 TABLET ORAL at 00:09

## 2020-08-12 RX ADMIN — Medication 25 MILLIGRAM(S): at 05:26

## 2020-08-12 RX ADMIN — AMIODARONE HYDROCHLORIDE 400 MILLIGRAM(S): 400 TABLET ORAL at 05:26

## 2020-08-12 RX ADMIN — Medication 10 MILLIGRAM(S): at 05:26

## 2020-08-12 RX ADMIN — MAGNESIUM OXIDE 400 MG ORAL TABLET 400 MILLIGRAM(S): 241.3 TABLET ORAL at 09:03

## 2020-08-12 NOTE — PROGRESS NOTE ADULT - ASSESSMENT
87y old  Male with PMH/ HFpEF, CAD, HTN, HLD, DM type 2 on oral hypoglycemics, CKD stage III; rib fractures complicated by L-sided pleural effusion requiring thoracentesis ~2 years ago; B/L pleural effusions, s/p L thoracentesis- 01/02/20, transudative, chronic lightheadedness, GERD, who presents with dyspnea and CHF.    1. Dyspnea   - Multifactorial, Bilateral transudative pleural effusions, acute on chronic systolic and diastolic CHF, Anemia   - Improved s/p R chest tube, now removed. No plan for pleurex as pts right effusion is not recurrent per CTS  - Spoke to IR, loculated L sided effusions, will defer L thoracocentesis at this time and monitor  - Fluid analysis supports transudative effusion, cytology neg for malignant cells, cultures ntd  - Lasix BID changed to Torsemide  - Not requiring supplemental O2    2. NSVT-  - Keep K > 4 & Mg > 2  - Monitor on tele  - Started amiodarone per Dr. Cardenas / tyler start 400 q 8 x 3days/ then 400mg BID x 3days then 200mg daily  - C/w Eliquis BID     3. Acute on chronic systolic & diastolic CHF with negative troponin and elevated BNP   - S/p Echo, EF 45-50%  - S/p cardiac cath, non-obstructive CAD  - Lasix BID changed to Torsemide   - Holding ACEI given soft BPs  - Outpatient cardio follow up on DC    4. New onset Afib  - Rate controlled with amio  - Sarted metoprolol succinate 25mg QD as per cards  - CHADSVASC 5  - C/w eliquis     5. L heel ulcer  - Pt followed outpatient by Dr Erazo, who recommended betadine BID  - Xrays no OM  - ABIS Reviewed   - S/p bedside bone biopsy by podiatry  - Stable for DC from podiatry stand point, will need to follow at wound care center on discharge   - Applied Santyl/DSD  - Recommend offloading to LLE while in bed    6. T2DM  - HbA1c 6.3% , goal <8.0%  - D/c sliding scale and monitor with daily labs  - DASH diet    7. Weight loss and early satiety  - Could be CHF related but may need GI workup   - Nutrition consulted    8. CAD/HLD  - non-obstructive CAD as per cardiac cath, cont statin and aspirin   - positive calcification on CT Lcx and LAD    9. 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 CKD  - stable   - renal function stable post cath    11. Severe Protein Calorie Malnutrition  - Nutrition eval called  - Add Glucerna TID   - MVI and vit C 500mg daily.       DVT PPX- on eliquis    Dispo: D/c home pending homecare arrangements

## 2020-08-12 NOTE — PROGRESS NOTE ADULT - SUBJECTIVE AND OBJECTIVE BOX
[facility]  [facility]ACT[facility]  [address] [address]    REASON FOR VISIT:  follow up on Chf  UPDATE:  Continues on amindarone load  ekg pending  TELEMETRY MONITORING:  Nsr 3 beat nsvt  08-12    132<L>  |  92<L>  |  41.0<H>  ----------------------------<  118<H>  4.8   |  25.0  |  1.64<H>    Ca    8.5<L>      12 Aug 2020 07:08  Phos  2.8     08-12  Mg     2.0     08-12      MEDICATIONS  (STANDING):  aMIOdarone    Tablet 400 milliGRAM(s) Oral every 8 hours  apixaban 2.5 milliGRAM(s) Oral every 12 hours  ascorbic acid 500 milliGRAM(s) Oral daily  aspirin  chewable 81 milliGRAM(s) Oral daily  atorvastatin 20 milliGRAM(s) Oral at bedtime  collagenase Ointment 1 Application(s) Topical daily  Dakins Solution - 1/2 Strength 1 Application(s) Topical daily  insulin lispro (HumaLOG) corrective regimen sliding scale   SubCutaneous three times a day before meals  insulin lispro (HumaLOG) corrective regimen sliding scale   SubCutaneous at bedtime  latanoprost 0.005% Ophthalmic Solution 1 Drop(s) Both EYES at bedtime  magnesium oxide 400 milliGRAM(s) Oral three times a day with meals  metoprolol succinate ER 25 milliGRAM(s) Oral daily  multivitamin 1 Tablet(s) Oral daily  pantoprazole    Tablet 40 milliGRAM(s) Oral before breakfast  polyethylene glycol 3350 17 Gram(s) Oral daily  povidone iodine 10% Solution 1 Application(s) Topical two times a day  senna 2 Tablet(s) Oral at bedtime  torsemide 10 milliGRAM(s) Oral daily    ROS:  No fever chills  Cardiac  No cp sob or palp  Resp  no cough no mucus production  Gi  no abd pain no melana  Ext No calf tenderness, no edema    Vital Signs Last 24 Hrs  T(C): 36.3 (12 Aug 2020 07:22), Max: 37 (11 Aug 2020 23:45)  T(F): 97.4 (12 Aug 2020 07:22), Max: 98.6 (11 Aug 2020 23:45)  HR: 80 (12 Aug 2020 07:22) (70 - 90)  BP: 109/69 (12 Aug 2020 07:22) (96/53 - 138/88)  BP(mean): --  RR: 18 (12 Aug 2020 07:22) (16 - 18)  SpO2: 97% (11 Aug 2020 23:45) (96% - 98%)  T(C): 36.3 (08-12-20 @ 07:22), Max: 37 (08-11-20 @ 23:45)  HR: 80 (08-12-20 @ 07:22) (70 - 90)  BP: 109/69 (08-12-20 @ 07:22) (96/53 - 138/88)  RR: 18 (08-12-20 @ 07:22) (16 - 18)  SpO2: 97% (08-11-20 @ 23:45) (96% - 98%)    cachectic appearing  HEENT Head atraumatic eomi, oral mucosa moist  Schfoid chest  CV S1&S2 regular decreased  RESP  clear  GI  Soft active bowel sounds non tender  EXT  No clubbing/Cyanosis /  Chronic venous statis changes  no edema left heel/foot wrapped  NEURO  Alert oriented  No gross motor or sensory deficits    < from: TTE Echo Complete w/o Contrast w/ Doppler (08.04.20 @ 08:49) >  1. Left ventricular ejection fraction, by visual estimation, is 45 to 50%.   2. Mildly decreased global left ventricular systolic function.   3. Mildly increased LV wall thickness.   4. Normal left ventricular internal cavity size.   5. Spectral Doppler shows pseudonormal pattern of left ventricular myocardial filling (Grade II diastolic dysfunction).   6. Large pleural effusion in both left and right lateral regions.   7. There is no evidence of pericardial effusion.   8. Mild mitral valve regurgitation.   9. Thickening and calcification of the anterior and posterior mitral valve leaflets.  10. Moderate tricuspid regurgitation.  11. Estimated pulmonary artery systolic pressure is 55.3 mmHg assuming a right atrial pressure of 8 mmHg, which is consistent with moderate pulmonary hypertension.  12. Mitral valve mean gradient is 4.0 mmHg consistent with mild mitral stenosis.  13. The aortic valve mean gradient is 3.0 mmHg consistent with normally opening aortic valve.    [ ]  Catheterization:  < from: Cardiac Cath Lab - Adult (08.05.20 @ 16:42) >  VENTRICLES: No left ventriculogram was performed.  CORONARY VESSELS: The coronary circulation is right dominant.  LM:   --  LM: The vessel was normal sized. Angiography showed minor luminal  irregularities with no flow limiting lesions.  LAD:   --  LAD: The vessel was normal sized and moderately calcified.  Angiography showed moderate atherosclerosis.  --  Mid LAD: There was a discrete 50 % stenosis.  CX:   --  Circumflex: The vessel was large sized (dominant).  --  Ostial circumflex: There was a discrete 40 % stenosis.  --  Distal circumflex: There was a discrete 60 % stenosis. It does not  appear amenable to intervention.  --  OM1: The vessel was normal sized. There was a diffuse 40 % stenosis.  RCA:   --  RCA: Normal. The vessel was very small sized.  COMPLICATIONS: No complications occurred during the cath lab visit.  DIAGNOSTIC RECOMMENDATIONS: Overall non-obstructive CAD. Would maximize  medical therapy for diastolic CHF and CAD.

## 2020-08-12 NOTE — PROGRESS NOTE ADULT - PROVIDER SPECIALTY LIST ADULT
Anesthesia
Cardiology
Hospitalist
Podiatry
Thoracic Surgery
Cardiology
Cardiology
Hospitalist
Thoracic Surgery

## 2020-08-12 NOTE — PROGRESS NOTE ADULT - SUBJECTIVE AND OBJECTIVE BOX
Podiatry interval HPI: Pt was seen by podiatry this afternoon. Bone biopsy came back negative, but culture swab positive for MRSA.    Patient is a 87y old  Male who presents with a chief complaint of SOB, CP,m possible ACS , CHF (06 Aug 2020 15:47)      HPI:  87y old  Male with PMH/ HFpEF, CAD, HTN, HLD, DM type 2 on oral hypoglycemics, CKD stage III; rib fractures complicated by L-sided pleural effusion, requiring thoracentesis ~2 years ago; B/L pleural effusions, s/p L thoracentesis- 01/02/20, transudative, chronic lightheadedness, GERD, who presents with a chief complaint of MEJÍA and gait instability. Pt states that since about 2 mos ago he has been feeling short of breath, more with exertion -- walking around his apartment or with bending to put his shoes on. Pt states that today his visiting nurse became concerned about his breathing, him getting "winded" with minimal exertion and abnormal lung sounds. Pt states that he has been feeling the same since about 2 mos ago. Pt states that sometimes he feels chest burning if he eats late or he eats heavy meal, especially meat. pt states that he lost about 10 lbs since about 2 mos ago due to has been taking Abx for L heal ulcer and his appetite has been poor. Pt states that his doctor discontinued BP medications 2/2 low BP. Pt states that he has been drinking 0.5 L bottles of water-- about 2-3 every day. Pt denies any CP/dizziness/diaphoresis/F/C/cough/abdominal pain/sick contacts/other complaints.   Above HPI per cardio  read and agreed with verified with patient -In Addition , he was started on Zinc per his PCP 2 weeks ago when he visited for SOB , he denied any edema or orthopnea , denied fever or cough or sick contact   States he is anxious about the cardiac cath and i explained the cath procedure , discussed that he might need thoracentesis with bilateral pleural effusions.    History as above on admission. Podiatry consulted for Left heel ulcer. Pt states he has been following up with Dr. Erazo as outpatient and has been getting weekly debridements. States he had a bone biopsy done last week, was supposed to get the results this week but missed his appt due to being admitted. Denies f/c/n/v.     PAST MEDICAL & SURGICAL HISTORY:  DM (diabetes mellitus)  HLD (hyperlipidemia)  HTN (hypertension)  PVCs (premature ventricular contractions)  CHF (congestive heart failure)  H/O right inguinal hernia repair    Allergies: No Known Allergies    Medications: acetaminophen   Tablet .. 650 milliGRAM(s) Oral every 6 hours PRN  ALPRAZolam 0.25 milliGRAM(s) Oral daily PRN  aspirin  chewable 81 milliGRAM(s) Oral daily  atorvastatin 20 milliGRAM(s) Oral at bedtime  heparin   Injectable 4500 Unit(s) IV Push every 6 hours PRN  heparin   Injectable 2000 Unit(s) IV Push every 6 hours PRN  heparin  Infusion.  Unit(s)/Hr IV Continuous <Continuous>  lidocaine 1% Injectable 10 milliLiter(s) Local Injection once  metoprolol succinate ER 25 milliGRAM(s) Oral daily  pantoprazole    Tablet 40 milliGRAM(s) Oral before breakfast  povidone iodine 10% Solution 1 Application(s) Topical two times a day  torsemide 10 milliGRAM(s) Oral daily    FH:FAMILY HISTORY:  FH: CAD (coronary artery disease): Mother                            Vital Signs Last 24 Hrs  T(C): 36.3 (12 Aug 2020 07:22), Max: 37 (11 Aug 2020 23:45)  T(F): 97.4 (12 Aug 2020 07:22), Max: 98.6 (11 Aug 2020 23:45)  HR: 80 (12 Aug 2020 07:22) (70 - 90)  BP: 109/69 (12 Aug 2020 07:22) (96/53 - 138/88)  BP(mean): --  RR: 18 (12 Aug 2020 07:22) (16 - 18)  SpO2: 97% (11 Aug 2020 23:45) (96% - 98%)                        9.5    8.92  )-----------( 211      ( 12 Aug 2020 07:08 )             32.7   08-12    132<L>  |  92<L>  |  41.0<H>  ----------------------------<  118<H>  4.8   |  25.0  |  1.64<H>    Ca    8.5<L>      12 Aug 2020 07:08  Phos  2.8     08-12  Mg     2.0     08-12              ROS  REVIEW OF SYSTEMS:  CONSTITUTIONAL: No fever, weight loss, or fatigue  RESPIRATORY: No cough, wheezing, chills or hemoptysis; No shortness of breath  CARDIOVASCULAR: No chest pain, palpitations, dizziness, or leg swelling  SKIN: Left  lateral heel ulcer    PHYSICAL EXAM  LE Focused:    Vasc:  DP/PT pulses lightly palpable, cap refill <5 secs  Derm: Left lateral heel ulcer measuring ~3.0 cm x 1.0 cm x 1.0 cm with fibrogranular base and necrotic borders. No drainage noted, probes  to bone, foul smelling   Neuro: Light touch sensation grossly intact  MSK: Pain on palpation to Left heel ulcer.         Cultures: taken    A: Left heel ulcer    P:  Patient evaluated, chart revie< from: Xray Foot AP + Lateral + Oblique, Left (08.07.20 @ 14:19) >     EXAM:  FOOT-LEFT                          PROCEDURE DATE:  08/07/2020          INTERPRETATION:  TECHNIQUE: Three views left foot    COMPARISON: None.    CLINICAL HISTORY: Left heel ulcer.    FINDINGS:    Frontal, lateral and oblique views of the left foot shows no evidence for acute fracture, subluxation or dislocation. Mild vascular calcifications. Small heel spurs. Mild degenerative changes of the toes and midfoot. Mild diffuse soft tissue swelling surrounding the forefoot. Mild soft tissue swelling surrounding the calcaneus. Consider CT or MRI as clinically warranted.    IMPRESSION:  Mild diffuse soft tissue swelling surrounding the forefoot and calcaneus. No evidence of osteomyelitis. MRI is more sensitive.              RADHA EASTON M.D., ATTENDING RADIOLOGIST  This document has been electronically signed. Aug  7 2020  6:25PM        < end of copied text >    Plan:  Discussed diagnosis and treatment plan with patient  Xrays reviewed: no OM  ABIS ordered - reviewed  Bedside bone biopsy done as per Dr Herlinda Escamilla's recomendation. Pt had a previous bone biopsy done few weeks ago at wound care centre and results were unobtainable. new bone biopsy negative but culture swab positive for MRSA  Applied dAIKIN DSD.  Recommend offloading to LLE while in bed  Patient stable for discharge from podiatry stand point and to follow at wound care centre.  Discussed with Dr. Patiño    wound care instructions:  clean ulcer with Dakin solution  Apply santyl to gauze and secure gauze at left lateral heel ulcer area with saira and tape.  change every other day.  keep dressing dry, clean and intact

## 2020-08-12 NOTE — PROGRESS NOTE ADULT - ATTENDING COMMENTS
87 Female scheduled for Right VATS pleurax cath,  chart donnwed Anesthesia to be discussed by anesthesia team on day of surgery
Disccussed with daughter Naye
Disccussed with daughter Naye
I have personally seen and examined patient.  Above note reviewed and discussed with PA, modified where appropriate. Voices no complaints.    PHYSICAL EXAM:  GENERAL: frail, elderly, NAD  HEAD: Atraumatic, Normocephalic  EYES: EOMI, PERRLA, conjunctiva and sclera clear  ENMT: Moist mucous membranes  NECK: Supple, No JVD  NERVOUS SYSTEM:  Alert & Oriented X3, Motor Strength 5/5 B/L upper and lower extremities; DTRs 2+ intact and symmetric  CHEST/LUNG: Clear to auscultation bilaterally; No rales, rhonchi, wheezing, or rubs, no bleeding from dressing site  HEART: Regular rate and rhythm; No murmurs, rubs, or gallops  ABDOMEN: Soft, Nontender, Nondistended; Bowel sounds present  EXTREMITIES:  2+ Peripheral Pulses, No clubbing, cyanosis, or edema
Right pigtail still draining  Possibly pleurex on Monday  continue heparin anticoagulation, eliquis before discharge after catheter placement
pt seen and examined  right pig tail, draining.   Cath with non-obstructive disease  we will plan to add HF meds  I agree with hernandez
Above noted  pt is stable for rehab  repeat cxr in about 2 weeks, if pleural effusion reaccumulates, will need pleurex catheter  fall precaution
pt seen and examined  NSVT on monitor. Pt with right chest tube, Ct surgery input is appreciated.  No plan to have plurex catheter fro now.  Started Amiodarone, EKGs 3 hours post 1st 3 doses.   Pt will need rehab and will plan to see if he is at high fall risk after rehab. In that case he is not a candidate for long term AC.  I agree with the above a/p.  I discussed the above with NP.
pt seen and examined  afib on monitor, hx of afib, started on ac  doing beter, CT is still draining  cont with HF meds as BP allows  I will follow with you
pt seen and examined.   In NSR, CT still draining, today more than 200 ml at time of exam  Spoke to CT surgery. Pt will need Plurex catheter. Possibly for monday  cont with HF meds as tolerated.  Heparin drip for afib. Once catheter is placed, can dc home on eliquis 2.5 mg bid
pt seen and examined. Feels fatigued. Right CT removed. Tele reviewed.  Pt is on amiodarone load. Qtc will be checked today.  Cont with AC and Amio  Plan to have out patient CXR in a few days for evaluation of pericardial effusion.  FU with me in office in 2 weeks.  Thank you

## 2020-08-12 NOTE — PROGRESS NOTE ADULT - SUBJECTIVE AND OBJECTIVE BOX
CHIEF COMPLAINT/INTERVAL HISTORY:    Patient is a 87y old  Male who presents with a chief complaint of SOB, CP, possible ACS , CHF (08 Aug 2020 12:04)     Pt seen and examined at bedside   Laying in bed, no complaints, wants to go home   Denies chest pain, palpitations, light headedness/dizziness, cough, fevers/chills, abdominal pain, n/v, diarrhea/constipation, dysuria or increased urinary frequency   VSS at bedside   DISPO pending homecare arrangements     Vital Signs Last 24 Hrs  T(C): 36.3 (12 Aug 2020 07:22), Max: 37 (11 Aug 2020 23:45)  T(F): 97.4 (12 Aug 2020 07:22), Max: 98.6 (11 Aug 2020 23:45)  HR: 80 (12 Aug 2020 07:22) (70 - 92)  BP: 109/69 (12 Aug 2020 07:22) (96/53 - 138/88)  BP(mean): --  RR: 18 (12 Aug 2020 07:22) (14 - 18)  SpO2: 97% (11 Aug 2020 23:45) (96% - 98%)    PHYSICAL EXAM:  GENERAL: frail, elderly, NAD  HEAD: Atraumatic, Normocephalic  EYES: EOMI, PERRLA, conjunctiva and sclera clear  ENMT: Moist mucous membranes  NECK: Supple, No JVD  NERVOUS SYSTEM:  Alert & Oriented X3, Motor Strength 5/5 B/L upper and lower extremities; DTRs 2+ intact and symmetric  CHEST/LUNG: Clear to auscultation bilaterally; No rales, rhonchi, wheezing, or rubs, no bleeding from dressing site  HEART: Regular rate and rhythm; No murmurs, rubs, or gallops  ABDOMEN: Soft, Nontender, Nondistended; Bowel sounds present  EXTREMITIES:  2+ Peripheral Pulses, No clubbing, cyanosis, or edema

## 2020-08-12 NOTE — PROGRESS NOTE ADULT - ASSESSMENT
87y old  Male with PMH of HFpEF, non obstructive CAD on cath 1/ 2020 ,HTN, HLD, DM type 2 ,Severe Gerd.  CKD stage III. hx of rib fractures with subsequent L plerual effusion. -> transudative  presents with generalized weakness,  Malnutrition as per patient due to severe GERD and PVD chronic left hell ulcer presents with generalized weakness, sob and recurrent lung effusion. Echo repeated this admit and found to have a  EF 45-50% with moderate pulmonary hypertension        Cardiac meds  aMIOdarone    Tablet 400 milliGRAM(s) Oral every 8 hours  apixaban 2.5 milliGRAM(s) Oral every 12 hours  aspirin  chewable 81 milliGRAM(s) Oral daily  atorvastatin 20 milliGRAM(s) Oral at bedtime  magnesium oxide 400 milliGRAM(s) Oral three times a day with meals  metoprolol succinate ER 25 milliGRAM(s) Oral daily  pantoprazole    Tablet 40 milliGRAM(s) Oral before breakfast  torsemide 10 milliGRAM(s) Oral daily 87y old  Male with PMH of HFpEF, non obstructive CAD on cath 1/ 2020 ,HTN, HLD, DM type 2 ,Severe Gerd.  CKD stage III. hx of rib fractures with subsequent L plerual effusion. -> transudative  presents with generalized weakness, Malnutrition as per patient due to severe GERD and PVD chronic left hell ulcer presents with generalized weakness, sob and recurrent lung effusion. Bnp [id number], Echo repeated this admit and found to have a  EF 45-50% with moderate pulmonary hypertension    CHF  s/p pigtail cath and removal  CXR in 2 weeks  Demadex 10mg qd  may need qod dosing  appears to be getting on dry side    CHRONIC RENAL INSUFF  Slightly higher than baseline  Avoid nephro toxic meds    NON OBSTRUCTIVE CAD  metoprolol Lipitor  no asa due to severe GERD    GERD  c/w protonix qd  patient states he had a endoscopy within  the year     ATRIAL FIB/NSVT  kEEP K >4 and MG >2  amiodarone load.eliquis    Cardiac meds  aMIOdarone    Tablet 400 milliGRAM(s) Oral every 8 hours  apixaban 2.5 milliGRAM(s) Oral every 12 hours  aspirin  chewable 81 milliGRAM(s) Oral daily  atorvastatin 20 milliGRAM(s) Oral at bedtime  magnesium oxide 400 milliGRAM(s) Oral three times a day with meals  metoprolol succinate ER 25 milliGRAM(s) Oral daily  pantoprazole    Tablet 40 milliGRAM(s) Oral before breakfast  torsemide 10 milliGRAM(s) Oral daily

## 2020-08-13 VITALS — RESPIRATION RATE: 18 BRPM | HEART RATE: 81 BPM | OXYGEN SATURATION: 90 % | TEMPERATURE: 98 F

## 2020-08-13 LAB
-  AMPICILLIN/SULBACTAM: SIGNIFICANT CHANGE UP
-  AMPICILLIN: SIGNIFICANT CHANGE UP
-  CEFAZOLIN: SIGNIFICANT CHANGE UP
-  CLINDAMYCIN: SIGNIFICANT CHANGE UP
-  DAPTOMYCIN: SIGNIFICANT CHANGE UP
-  ERYTHROMYCIN: SIGNIFICANT CHANGE UP
-  GENTAMICIN: SIGNIFICANT CHANGE UP
-  LINEZOLID: SIGNIFICANT CHANGE UP
-  OXACILLIN: SIGNIFICANT CHANGE UP
-  PENICILLIN: SIGNIFICANT CHANGE UP
-  RIFAMPIN: SIGNIFICANT CHANGE UP
-  TETRACYCLINE: SIGNIFICANT CHANGE UP
-  TETRACYCLINE: SIGNIFICANT CHANGE UP
-  TRIMETHOPRIM/SULFAMETHOXAZOLE: SIGNIFICANT CHANGE UP
-  VANCOMYCIN: SIGNIFICANT CHANGE UP
-  VANCOMYCIN: SIGNIFICANT CHANGE UP
METHOD TYPE: SIGNIFICANT CHANGE UP
METHOD TYPE: SIGNIFICANT CHANGE UP

## 2020-08-16 LAB
CULTURE RESULTS: SIGNIFICANT CHANGE UP
ORGANISM # SPEC MICROSCOPIC CNT: SIGNIFICANT CHANGE UP
SPECIMEN SOURCE: SIGNIFICANT CHANGE UP

## 2020-09-09 LAB
CULTURE RESULTS: SIGNIFICANT CHANGE UP
SPECIMEN SOURCE: SIGNIFICANT CHANGE UP

## 2020-10-19 ENCOUNTER — APPOINTMENT (OUTPATIENT)
Dept: ENDOCRINOLOGY | Facility: CLINIC | Age: 85
End: 2020-10-19

## 2021-10-17 NOTE — ED PROVIDER NOTE - CARE PLAN
[de-identified] : as per mom and pt throat hurts and burns when drinking  [FreeTextEntry6] : Covid test still pending\par Was seen yesterday for the same thing, hurts more today\par afebrile\par  Principal Discharge DX:	CHF exacerbation

## 2022-04-13 NOTE — ED ADULT NURSE NOTE - SUICIDE SCREENING DEPRESSION
SURVEY:    You may be receiving a survey from [employer] regarding your visit today. Please complete the survey to enable us to provide the highest quality of care to you and your family. If you cannot score us a very good on any question, please call the office to discuss how we could have made your experience a very good one. Thank you.
Negative

## 2022-05-17 NOTE — CONSULT NOTE ADULT - PROBLEM SELECTOR PROBLEM 1
5/17/2022  2:50 PM    Patient left message on machine at 1.15 asking for a call back she didn't give a reason on the message.  Please call her back at [phone] Pleural effusion

## 2022-09-09 NOTE — ED PROVIDER NOTE - NSCAREINITIATED _GEN_ER
Pharmacist chart review completed for refill of Humira indicates no medication or significant health changes since last pharmacist counseling. No questions for the pharmacist. Communicated with patient over phone. Patient's prescription was billed through Medicaid ([facility]). Medication shipped on 9/13/22 via [employer] to [facility] [address] [place] [address] for delivery in 1-2 business days.     [facility]  Phone: [phone]  [email or web address]            Eliu Pavon(Attending)

## 2023-03-08 NOTE — ED PROVIDER NOTE - NS ED ROS FT
CRS PACU/Event Note
Event Note
Constitutional: no fever, sweats, and no chills.  Eyes: no pain, no redness, and no visual changes.  ENMT: no ear pain and no hearing problems, no nasal congestion/drainage, no dysphagia, and no throat pain, no neck pain, no stiffness  CV: no chest pain, no edema.  Resp: no cough, +dyspnea  GI: no abdominal pain, no bloating, no constipation, no diarrhea, no nausea and no vomiting.  : no dysuria, no hematuria  MSK: no msk pain, no weakness  Skin: +ulcer L heel, no rashes.  Neuro: no LOC, no headache, no sensory deficits, and no weakness.

## 2023-08-06 NOTE — PHYSICAL THERAPY INITIAL EVALUATION ADULT - GENERAL OBSERVATIONS, REHAB EVAL
Pt rec'd in room supine in bed breathing RA in NAD, tele monitor paused by RN. Pt appears frail in appearance.
Statement Selected